# Patient Record
Sex: FEMALE | Race: WHITE | NOT HISPANIC OR LATINO | Employment: OTHER | URBAN - METROPOLITAN AREA
[De-identification: names, ages, dates, MRNs, and addresses within clinical notes are randomized per-mention and may not be internally consistent; named-entity substitution may affect disease eponyms.]

---

## 2017-01-03 ENCOUNTER — OFFICE VISIT (OUTPATIENT)
Dept: FAMILY MEDICINE CLINIC | Facility: CLINIC | Age: 77
End: 2017-01-03

## 2017-01-03 VITALS
HEIGHT: 63 IN | OXYGEN SATURATION: 97 % | RESPIRATION RATE: 16 BRPM | TEMPERATURE: 98.5 F | WEIGHT: 137.2 LBS | BODY MASS INDEX: 24.31 KG/M2 | SYSTOLIC BLOOD PRESSURE: 120 MMHG | HEART RATE: 80 BPM | DIASTOLIC BLOOD PRESSURE: 72 MMHG

## 2017-01-03 DIAGNOSIS — H66.93 BILATERAL OTITIS MEDIA, UNSPECIFIED CHRONICITY, UNSPECIFIED OTITIS MEDIA TYPE: ICD-10-CM

## 2017-01-03 DIAGNOSIS — J06.9 ACUTE URI: Primary | ICD-10-CM

## 2017-01-03 PROCEDURE — 99213 OFFICE O/P EST LOW 20 MIN: CPT | Performed by: NURSE PRACTITIONER

## 2017-01-03 RX ORDER — CEFUROXIME AXETIL 250 MG/1
250 TABLET ORAL 2 TIMES DAILY
Qty: 20 TABLET | Refills: 0 | Status: SHIPPED | OUTPATIENT
Start: 2017-01-03 | End: 2017-01-18

## 2017-01-03 NOTE — MR AVS SNAPSHOT
Sheila Burleson   1/3/2017 10:40 AM   Office Visit    Dept Phone:  [phone]   Encounter #:  [id number]    Provider:  KAREN Murray   Department:  [facility] MED                Your Full Care Plan              Today's Medication Changes          These changes are accurate as of: 1/3/17 10:42 AM.  If you have any questions, ask your nurse or doctor.               New Medication(s)Ordered:     cefuroxime 250 MG tablet   Commonly known as:  CEFTIN   Take 1 tablet by mouth 2 (Two) Times a Day.   Started by:  KAREN Murray         Stop taking medication(s)listed here:     nystatin [id number] UNIT/ML suspension   Commonly known as:  MYCOSTATIN   Stopped by:  Kathleen Morris MA                Where to Get Your Medications      These medications were sent to [address] AT [facility] ([facility]) - [phone] [facility] [phone] [place] [address]     Phone:  [phone]     cefuroxime 250 MG tablet                  Your Updated Medication List          This list is accurate as of: 1/3/17 10:42 AM.  Always use your most recent med list.                aspirin 81 MG tablet       cefuroxime 250 MG tablet   Commonly known as:  CEFTIN   Take 1 tablet by mouth 2 (Two) Times a Day.       dicyclomine 10 MG capsule   Commonly known as:  BENTYL   Take 1 capsule by mouth 4 (Four) Times a Day Before Meals & at Bedtime.       fluticasone 50 MCG/ACT nasal spray   Commonly known as:  FLONASE       levothyroxine 75 MCG tablet   Commonly known as:  SYNTHROID, LEVOTHROID   Take 1 tablet by mouth daily.       meloxicam 15 MG tablet   Commonly known as:  MOBIC   Take 1 tablet by mouth Daily.       sertraline 100 MG tablet   Commonly known as:  ZOLOFT   Take 1 tablet by mouth daily.       Vitamin D2 2000 UNITS tablet       zolpidem 5 MG tablet   Commonly known as:  AMBIEN   Take 1  "tablet by mouth At Night As Needed for sleep.               You Were Diagnosed With        Codes Comments    Acute URI    -  Primary ICD-10-CM: J06.9  ICD-9-CM: 465.9     Bilateral otitis media, unspecified chronicity, unspecified otitis media type     ICD-10-CM: H66.93  ICD-9-CM: 382.9       Instructions     None    Patient Instructions History      Upcoming Appointments     Visit Type Date Time Department    SAME DAY 1/3/2017 10:40 AM [facility]      [employer] Signup     Our records indicate that you have an active [employer] account.    You can view your After Visit Summary by going to joblocal and logging in with your [employer] username and password.  If you don't have a [employer] username and password but a parent or guardian has access to your record, the parent or guardian should login with their own [employer] username and password and access your record to view the After Visit Summary.    If you have questions, you can email [email or web address] or call [phone] to talk to our [employer] staff.  Remember, [employer] is NOT to be used for urgent needs.  For medical emergencies, dial 911.               Other Info from Your Visit           Allergies     Amoxicillin-pot Clavulanate      Clarithromycin  Nausea Only    Levofloxacin        Reason for Visit     Cough     Sore Throat           Vital Signs     Blood Pressure Pulse Temperature Respirations Height Weight    120/72 80 98.5 °F (36.9 °C) (Oral) 16 63\" (160 cm) 137 lb 3.2 oz (62.2 kg)    Oxygen Saturation Body Mass Index Smoking Status             97% 24.3 kg/m2 Never Smoker         Problems and Diagnoses Noted     Acute upper respiratory infection    -  Primary    Middle ear infection affecting both ears            "

## 2017-01-03 NOTE — PROGRESS NOTES
Subjective   Sheila Burleson is a 76 y.o. female.   Chief Complaint   Patient presents with   • Cough   • Sore Throat     Vitals:    01/03/17 1019   BP: 120/72   Pulse: 80   Resp: 16   Temp: 98.5 °F (36.9 °C)   SpO2: 97%     No LMP recorded.    History of Present Illness  Sheila is here for an acute visit. She c/o sore throat, ear pressure, and cough for 4 days. She went to the [facility] before alonzo and was given an antibiotic and a steroid but she did not finish the antibiotic because it made her sick to her stomach. She felt better and then her symptoms returned about 4 days ago. She denies fever, chills, or body aches. She did get her flu vaccine.     The following portions of the patient's history were reviewed and updated as appropriate: allergies, current medications, past family history, past medical history, past social history, past surgical history and problem list.    Review of Systems   Constitutional: Positive for fatigue. Negative for chills and fever.   HENT: Positive for ear pain, postnasal drip and sore throat.    Respiratory: Positive for cough. Negative for chest tightness, shortness of breath and wheezing.    Cardiovascular: Negative.        Objective   Physical Exam   Constitutional: Vital signs are normal. She appears well-developed and well-nourished. She appears ill. No distress.   HENT:   Right Ear: Ear canal normal. Tympanic membrane is injected and retracted.   Left Ear: Ear canal normal. Tympanic membrane is injected and retracted.   Nose: Mucosal edema present.   Mouth/Throat: Posterior oropharyngeal erythema present.   Cardiovascular: Normal rate and regular rhythm.    Pulmonary/Chest: Effort normal and breath sounds normal.   Neurological: She is alert.       Assessment/Plan   Sheila was seen today for cough and sore throat.    Diagnoses and all orders for this visit:    Acute URI    Bilateral otitis media, unspecified chronicity, unspecified otitis media type    Other orders  -      cefuroxime (CEFTIN) 250 MG tablet; Take 1 tablet by mouth 2 (Two) Times a Day.      Start ceftin- amoxil/augmentin causes GI upset, she has tolerated omnicef in the past  Rest and fluids  Robitussin/mucinex  Ok to continue to flonase  RTO in 2 weeks for an ear recheck or sooner if needed

## 2017-01-12 DIAGNOSIS — Z12.11 SCREENING FOR COLON CANCER: ICD-10-CM

## 2017-01-12 DIAGNOSIS — D36.9 ADENOMATOUS POLYP: ICD-10-CM

## 2017-01-12 DIAGNOSIS — Z83.71 FHX: COLONIC POLYPS: Primary | ICD-10-CM

## 2017-01-18 ENCOUNTER — OFFICE VISIT (OUTPATIENT)
Dept: FAMILY MEDICINE CLINIC | Facility: CLINIC | Age: 77
End: 2017-01-18

## 2017-01-18 VITALS
BODY MASS INDEX: 24.31 KG/M2 | HEIGHT: 63 IN | SYSTOLIC BLOOD PRESSURE: 114 MMHG | DIASTOLIC BLOOD PRESSURE: 86 MMHG | HEART RATE: 72 BPM | OXYGEN SATURATION: 96 % | WEIGHT: 137.2 LBS | RESPIRATION RATE: 16 BRPM | TEMPERATURE: 98.2 F

## 2017-01-18 DIAGNOSIS — R05.9 COUGH: ICD-10-CM

## 2017-01-18 DIAGNOSIS — H69.93 EUSTACHIAN TUBE DISORDER, BILATERAL: Primary | ICD-10-CM

## 2017-01-18 PROCEDURE — 99213 OFFICE O/P EST LOW 20 MIN: CPT | Performed by: NURSE PRACTITIONER

## 2017-01-18 NOTE — PROGRESS NOTES
Subjective   Sheila Burleson is a 76 y.o. female.   Chief Complaint   Patient presents with   • Follow-up     ears      Vitals:    01/18/17 0854   BP: 114/86   Pulse: 72   Resp: 16   Temp: 98.2 °F (36.8 °C)   SpO2: 96%     No LMP recorded.    History of Present Illness  Sheila is here for a 2 week check up for URI and bilateral otitis media. She overall is feeling better. She continues to have some cracking and popping in her ears, but denies pain. She also has a productive cough with occasional purulent sputum. She denies fever, chills or body aches. She is still taking flonase. She finished ceftin 4 days ago.     The following portions of the patient's history were reviewed and updated as appropriate: allergies, current medications, past family history, past medical history, past social history, past surgical history and problem list.    Review of Systems   Constitutional: Negative for chills and fever.   HENT: Positive for postnasal drip. Negative for ear pain (ear pressure, popping and cracking ), rhinorrhea and sore throat.    Respiratory: Positive for cough. Negative for shortness of breath and wheezing.    Cardiovascular: Negative for chest pain.   Musculoskeletal: Positive for myalgias.   Skin: Negative for rash.   Neurological: Negative for headaches.       Objective   Physical Exam   Constitutional: Vital signs are normal. She appears well-developed and well-nourished. No distress.   HENT:   Right Ear: Tympanic membrane is erythematous (mild ). A middle ear effusion is present.   Left Ear: Tympanic membrane is not erythematous. A middle ear effusion is present.   Nose: Nose normal.   Mouth/Throat: Uvula is midline and oropharynx is clear and moist.   Cardiovascular: Normal rate, regular rhythm and normal heart sounds.    Pulmonary/Chest: Effort normal and breath sounds normal.   Congested cough    Neurological: She is alert.       Assessment/Plan   Sheila was seen today for follow-up.    Diagnoses and all  orders for this visit:    Eustachian tube disorder, bilateral    Cough      Continue flonase   Robitussin as needed for cough  Follow up if symptoms persist, or worsen or if you develop new symptoms

## 2017-01-18 NOTE — MR AVS SNAPSHOT
Sheila HAMMOND Sarah Beth   1/18/2017 9:00 AM   Office Visit    Dept Phone:  [phone]   Encounter #:  [id number]    Provider:  KAREN Murray   Department:  [facility] MED                Your Full Care Plan              Today's Medication Changes          These changes are accurate as of: 1/18/17  9:16 AM.  If you have any questions, ask your nurse or doctor.               Stop taking medication(s)listed here:     cefuroxime 250 MG tablet   Commonly known as:  CEFTIN   Stopped by:  KAREN Murray                      Your Updated Medication List          This list is accurate as of: 1/18/17  9:16 AM.  Always use your most recent med list.                aspirin 81 MG tablet       dicyclomine 10 MG capsule   Commonly known as:  BENTYL   Take 1 capsule by mouth 4 (Four) Times a Day Before Meals & at Bedtime.       fluticasone 50 MCG/ACT nasal spray   Commonly known as:  FLONASE       levothyroxine 75 MCG tablet   Commonly known as:  SYNTHROID, LEVOTHROID   Take 1 tablet by mouth daily.       meloxicam 15 MG tablet   Commonly known as:  MOBIC   Take 1 tablet by mouth Daily.       sertraline 100 MG tablet   Commonly known as:  ZOLOFT   Take 1 tablet by mouth daily.       Vitamin D2 2000 UNITS tablet       zolpidem 5 MG tablet   Commonly known as:  AMBIEN   Take 1 tablet by mouth At Night As Needed for sleep.               Instructions     None    Patient Instructions History      Upcoming Appointments     Visit Type Date Time Department    FOLLOW UP 1/18/2017  9:00 AM [facility]      [employer] Signup     Our records indicate that you have an active [employer] account.    You can view your After Visit Summary by going to [employer] and logging in with your [employer] username and password.  If you don't have a [employer] username and password but a parent or guardian has access to your record, the parent or guardian should  "login with their own [employer] username and password and access your record to view the After Visit Summary.    If you have questions, you can email [email or web address] or call [phone] to talk to our [employer] staff.  Remember, [employer] is NOT to be used for urgent needs.  For medical emergencies, dial 911.               Other Info from Your Visit           Allergies     Amoxicillin-pot Clavulanate      Clarithromycin  Nausea Only    Levofloxacin        Reason for Visit     Follow-up ears       Vital Signs     Blood Pressure Pulse Temperature Respirations Height Weight    114/86 72 98.2 °F (36.8 °C) (Oral) 16 63\" (160 cm) 137 lb 3.2 oz (62.2 kg)    Oxygen Saturation Body Mass Index Smoking Status             96% 24.3 kg/m2 Never Smoker           "

## 2017-02-01 ENCOUNTER — OFFICE VISIT (OUTPATIENT)
Dept: FAMILY MEDICINE CLINIC | Facility: CLINIC | Age: 77
End: 2017-02-01

## 2017-02-01 ENCOUNTER — HOSPITAL ENCOUNTER (OUTPATIENT)
Dept: GENERAL RADIOLOGY | Facility: HOSPITAL | Age: 77
Discharge: HOME | End: 2017-02-01
Admitting: INTERNAL MEDICINE

## 2017-02-01 VITALS
HEART RATE: 73 BPM | BODY MASS INDEX: 23.92 KG/M2 | WEIGHT: 135 LBS | HEIGHT: 63 IN | OXYGEN SATURATION: 95 % | DIASTOLIC BLOOD PRESSURE: 80 MMHG | TEMPERATURE: 98.2 F | RESPIRATION RATE: 16 BRPM | SYSTOLIC BLOOD PRESSURE: 138 MMHG

## 2017-02-01 DIAGNOSIS — R53.82 CHRONIC FATIGUE: ICD-10-CM

## 2017-02-01 DIAGNOSIS — Z87.898 HISTORY OF FATIGUE: ICD-10-CM

## 2017-02-01 DIAGNOSIS — R53.83 FATIGUE, UNSPECIFIED TYPE: ICD-10-CM

## 2017-02-01 DIAGNOSIS — M54.31 BILATERAL SCIATICA: ICD-10-CM

## 2017-02-01 DIAGNOSIS — M54.50 LUMBAR SPINE PAIN: Primary | ICD-10-CM

## 2017-02-01 DIAGNOSIS — M54.32 BILATERAL SCIATICA: ICD-10-CM

## 2017-02-01 PROCEDURE — 72110 X-RAY EXAM L-2 SPINE 4/>VWS: CPT

## 2017-02-01 PROCEDURE — 99213 OFFICE O/P EST LOW 20 MIN: CPT | Performed by: INTERNAL MEDICINE

## 2017-02-02 PROBLEM — M54.50 LUMBAR SPINE PAIN: Status: ACTIVE | Noted: 2017-02-02

## 2017-02-02 PROBLEM — R53.83 FATIGUE: Status: ACTIVE | Noted: 2017-02-02

## 2017-02-02 LAB
ALBUMIN SERPL-MCNC: 4.8 G/DL (ref 3.5–5.2)
ALBUMIN/GLOB SERPL: 2 G/DL
ALP SERPL-CCNC: 90 U/L (ref 39–117)
ALT SERPL-CCNC: 15 U/L (ref 1–33)
AST SERPL-CCNC: 20 U/L (ref 1–32)
BASOPHILS # BLD AUTO: 0.04 10*3/MM3 (ref 0–0.2)
BASOPHILS NFR BLD AUTO: 0.9 % (ref 0–1.5)
BILIRUB SERPL-MCNC: 0.4 MG/DL (ref 0.1–1.2)
BUN SERPL-MCNC: 19 MG/DL (ref 8–23)
BUN/CREAT SERPL: 21.1 (ref 7–25)
CALCIUM SERPL-MCNC: 10.3 MG/DL (ref 8.6–10.5)
CHLORIDE SERPL-SCNC: 103 MMOL/L (ref 98–107)
CO2 SERPL-SCNC: 28.3 MMOL/L (ref 22–29)
CREAT SERPL-MCNC: 0.9 MG/DL (ref 0.57–1)
EOSINOPHIL # BLD AUTO: 0.42 10*3/MM3 (ref 0–0.7)
EOSINOPHIL NFR BLD AUTO: 9 % (ref 0.3–6.2)
ERYTHROCYTE [DISTWIDTH] IN BLOOD BY AUTOMATED COUNT: 13.9 % (ref 11.7–13)
GLOBULIN SER CALC-MCNC: 2.4 GM/DL
GLUCOSE SERPL-MCNC: 99 MG/DL (ref 65–99)
HCT VFR BLD AUTO: 45.9 % (ref 35.6–45.5)
HGB BLD-MCNC: 14.2 G/DL (ref 11.9–15.5)
IMM GRANULOCYTES # BLD: 0 10*3/MM3 (ref 0–0.03)
IMM GRANULOCYTES NFR BLD: 0 % (ref 0–0.5)
LYMPHOCYTES # BLD AUTO: 0.85 10*3/MM3 (ref 0.9–4.8)
LYMPHOCYTES NFR BLD AUTO: 18.2 % (ref 19.6–45.3)
MCH RBC QN AUTO: 28.2 PG (ref 26.9–32)
MCHC RBC AUTO-ENTMCNC: 30.9 G/DL (ref 32.4–36.3)
MCV RBC AUTO: 91.1 FL (ref 80.5–98.2)
MONOCYTES # BLD AUTO: 0.59 10*3/MM3 (ref 0.2–1.2)
MONOCYTES NFR BLD AUTO: 12.6 % (ref 5–12)
NEUTROPHILS # BLD AUTO: 2.78 10*3/MM3 (ref 1.9–8.1)
NEUTROPHILS NFR BLD AUTO: 59.3 % (ref 42.7–76)
PLATELET # BLD AUTO: 273 10*3/MM3 (ref 140–500)
POTASSIUM SERPL-SCNC: 4.5 MMOL/L (ref 3.5–5.2)
PROT SERPL-MCNC: 7.2 G/DL (ref 6–8.5)
RBC # BLD AUTO: 5.04 10*6/MM3 (ref 3.9–5.2)
SODIUM SERPL-SCNC: 143 MMOL/L (ref 136–145)
T4 FREE SERPL-MCNC: 1.11 NG/DL (ref 0.93–1.7)
TSH SERPL DL<=0.005 MIU/L-ACNC: 0.47 MIU/ML (ref 0.27–4.2)
VIT B12 SERPL-MCNC: 991 PG/ML (ref 211–946)
WBC # BLD AUTO: 4.68 10*3/MM3 (ref 4.5–10.7)

## 2017-02-02 NOTE — PROGRESS NOTES
Subjective   Sheila Burleson is a 76 y.o. female. Patient is here today for   Chief Complaint   Patient presents with   • Leg Pain     bilateral leg pain    • Cough     cough and congestion           Vitals:    02/01/17 0926   BP: 138/80   Pulse: 73   Resp: 16   Temp: 98.2 °F (36.8 °C)   SpO2: 95%       Past Medical History   Diagnosis Date   • Allergy desensitization therapy      gets shot weekly   • Hyperlipidemia    • Sleep apnea       Allergies   Allergen Reactions   • Amoxicillin-Pot Clavulanate    • Clarithromycin Nausea Only   • Levofloxacin    • Pollen Extract       Social History     Social History   • Marital status: Single     Spouse name: N/A   • Number of children: N/A   • Years of education: N/A     Occupational History   • Not on file.     Social History Main Topics   • Smoking status: Never Smoker   • Smokeless tobacco: Not on file   • Alcohol use Yes      Comment: social   • Drug use: Not on file   • Sexual activity: Not on file     Other Topics Concern   • Not on file     Social History Narrative        Current Outpatient Prescriptions:   •  aspirin 81 MG tablet, Take by mouth daily., Disp: , Rfl:   •  dicyclomine (BENTYL) 10 MG capsule, Take 1 capsule by mouth 4 (Four) Times a Day Before Meals & at Bedtime., Disp: 30 capsule, Rfl: 1  •  Ergocalciferol (VITAMIN D2) 2000 UNITS tablet, Take by mouth daily., Disp: , Rfl:   •  fluticasone (FLONASE) 50 MCG/ACT nasal spray, into each nostril daily., Disp: , Rfl:   •  levothyroxine (SYNTHROID, LEVOTHROID) 75 MCG tablet, Take 1 tablet by mouth daily., Disp: 90 tablet, Rfl: 3  •  meloxicam (MOBIC) 15 MG tablet, Take 1 tablet by mouth Daily., Disp: 30 tablet, Rfl: 1  •  sertraline (ZOLOFT) 100 MG tablet, Take 1 tablet by mouth daily., Disp: 30 tablet, Rfl: 3  •  zolpidem (AMBIEN) 5 MG tablet, Take 1 tablet by mouth At Night As Needed for sleep., Disp: 30 tablet, Rfl: 3     Objective     HPI Comments: She is fatigued.  She has bilateral leg pain and lumbar spine  pain.    She is a cough which has been nonproductive.    Leg Pain      Cough          Review of Systems   Constitutional: Positive for fatigue.   Respiratory: Positive for cough.    Musculoskeletal:        She complains of lumbar spine pain and bilateral lower extremity pain.   Psychiatric/Behavioral: Negative.        Physical Exam   Constitutional: She is oriented to person, place, and time.   HENT:   Head: Normocephalic and atraumatic.   Pulmonary/Chest: Effort normal.   Musculoskeletal:   She doesn't have any pain on palpation of lumbar spine.   Neurological: She is alert and oriented to person, place, and time. She has normal reflexes.   Psychiatric: She has a normal mood and affect. Her behavior is normal.   Nursing note and vitals reviewed.        Problem List Items Addressed This Visit        Nervous and Auditory    Bilateral sciatica    Lumbar spine pain - Primary    Relevant Orders    XR Spine Lumbar 4+ View (Completed)       Other    Fatigue    Relevant Orders    CBC & Differential (Completed)    Comprehensive Metabolic Panel (Completed)    Vitamin B12 (Completed)    TSH (Completed)    T4, Free (Completed)      Other Visit Diagnoses     History of fatigue                PLAN  I like he lumbar spine x-ray, CBC, comprehensive metabolic panel, vitamin B12, TSH and free T4.  I would like her to follow-up to discuss the results of the x-ray when it is available.  No Follow-up on file.

## 2017-02-03 ENCOUNTER — TELEPHONE (OUTPATIENT)
Dept: FAMILY MEDICINE CLINIC | Facility: CLINIC | Age: 77
End: 2017-02-03

## 2017-02-03 RX ORDER — AZITHROMYCIN 250 MG/1
TABLET, FILM COATED ORAL
Qty: 6 TABLET | Refills: 0 | Status: ON HOLD | OUTPATIENT
Start: 2017-02-03 | End: 2017-02-07

## 2017-02-03 NOTE — TELEPHONE ENCOUNTER
AFTER SPEAKING WITH , I CALLED THE PATIENT AND INFORMED HER THAT DR. WISEMAN WANTED TO CALL HER IN A Z-PACK AND THAT HE WANTED HER TO FOLLOW-UP IN ONE WEEK TO DISCUSS HER COUGH AND ALSO THE X-RAY THAT WAS ORDERED AT HER LAST VISIT.  PATIENT EXPRESSED UNDERSTANDING AND THE APPOINTMENT WAS MADE FOR THE PATIENT TODAY.  PRESCRIPTION WAS SENT TO PHARMACY   ----- Message from Niya Roberts MA sent at 2/3/2017 10:20 AM EST -----  SAW DR WISEMAN Wednesday FOR BACK PAIN AND COUGH.   SHE IS STILL HAVING COUGH AND SINUS INFECTION SYMPTOMS.   PER MISHA'S OFFICE NOTE SHE SAID FOR PATIENT TO FOLLOW UP IF SHE DOES NOT GET BETTER, PATIENT DID NOT WANT TO COME BACK IN SINCE SHE WAS JUST IN.   WOULD LIKE ANTIBIOTIC SEND OUT.

## 2017-02-04 ENCOUNTER — APPOINTMENT (OUTPATIENT)
Dept: CT IMAGING | Facility: HOSPITAL | Age: 77
End: 2017-02-04

## 2017-02-04 ENCOUNTER — HOSPITAL ENCOUNTER (EMERGENCY)
Facility: HOSPITAL | Age: 77
Discharge: HOME | End: 2017-02-04
Attending: EMERGENCY MEDICINE | Admitting: EMERGENCY MEDICINE

## 2017-02-04 VITALS
HEART RATE: 65 BPM | HEIGHT: 63 IN | OXYGEN SATURATION: 93 % | RESPIRATION RATE: 16 BRPM | TEMPERATURE: 97.6 F | DIASTOLIC BLOOD PRESSURE: 90 MMHG | BODY MASS INDEX: 23.92 KG/M2 | SYSTOLIC BLOOD PRESSURE: 165 MMHG | WEIGHT: 135 LBS

## 2017-02-04 DIAGNOSIS — H81.10 VERTIGO, BENIGN PAROXYSMAL, UNSPECIFIED LATERALITY: Primary | ICD-10-CM

## 2017-02-04 DIAGNOSIS — R03.0 TRANSIENT HYPERTENSION: ICD-10-CM

## 2017-02-04 LAB
HOLD SPECIMEN: NORMAL
HOLD SPECIMEN: NORMAL
WHOLE BLOOD HOLD SPECIMEN: NORMAL
WHOLE BLOOD HOLD SPECIMEN: NORMAL

## 2017-02-04 RX ORDER — LABETALOL HYDROCHLORIDE 5 MG/ML
10 INJECTION, SOLUTION INTRAVENOUS ONCE
Status: COMPLETED | OUTPATIENT
Start: 2017-02-04 | End: 2017-02-04

## 2017-02-04 RX ORDER — SODIUM CHLORIDE 0.9 % (FLUSH) 0.9 %
10 SYRINGE (ML) INJECTION AS NEEDED
Status: DISCONTINUED | OUTPATIENT
Start: 2017-02-04 | End: 2017-02-04 | Stop reason: HOSPADM

## 2017-02-04 RX ORDER — SCOLOPAMINE TRANSDERMAL SYSTEM 1 MG/1
1 PATCH, EXTENDED RELEASE TRANSDERMAL
Qty: 4 PATCH | Refills: 0 | Status: ON HOLD | OUTPATIENT
Start: 2017-02-04 | End: 2017-02-07

## 2017-02-04 RX ORDER — MECLIZINE HYDROCHLORIDE 50 MG/1
50 TABLET ORAL 3 TIMES DAILY PRN
Qty: 30 TABLET | Refills: 0 | Status: SHIPPED | OUTPATIENT
Start: 2017-02-04 | End: 2017-02-08 | Stop reason: HOSPADM

## 2017-02-04 RX ORDER — MECLIZINE HYDROCHLORIDE 25 MG/1
50 TABLET ORAL ONCE
Status: COMPLETED | OUTPATIENT
Start: 2017-02-04 | End: 2017-02-04

## 2017-02-04 RX ADMIN — MECLIZINE 50 MG: 25 TABLET ORAL at 13:50

## 2017-02-04 RX ADMIN — LABETALOL HYDROCHLORIDE 10 MG: 5 INJECTION, SOLUTION INTRAVENOUS at 14:29

## 2017-02-04 NOTE — ED NOTES
"3 hours ago pt. Was laying down watching t.v., sat up, felt dizzy and noticed when she turned her head she saw \"double.\" Pt. Noticed she also had an unsteady gait, but no noticeable change in mobility. Strength. Pt. Awoke normal this am at 9am.      Aldair Angeles RN  02/04/17 3207    "

## 2017-02-04 NOTE — ED PROVIDER NOTES
" EMERGENCY DEPARTMENT ENCOUNTER    CHIEF COMPLAINT  Chief Complaint: dizziness  History given by: patient   History limited by: none  Room Number: 10/10  PMD: Silvano Barnhart MD      HPI:  Pt is a 76 y.o. female who presents complaining of dizziness onset 3-4 hours ago while getting up from a supine position. The pt describes her dizziness as \"spinning\" that worsens when turning her head (especially to the left moreso than the right). She has intermittent double vision with lateral gaze as well. Pt states that she has had a sinus infection 4 weeks ago that is now resolved except for some congestion and left ear pain. She also reports cough for 4 weeks, L ear pressure and pain with no tinnitus, but she does have a popping sensation at times.   She has no gait imbalance and denies any other hearing problems.  No HA, no numbness nor weakness to extremities. Pt denies any recent falls.        Duration:  3-4 hours  Onset: gradual   Timing: intermittent   Location: n/a  Radiation: n/a  Quality: \"spinning\"  Intensity/Severity: moderate  Progression: unchanged  Associated Symptoms: cough, sinus congestion, L ear pressure and pain with no ringing, gait imbalance, double vision  Aggravating Factors: turning her head L>R  Alleviating Factors: none  Previous Episodes: recent upper respiratory infection 4 weeks prior  Treatment before arrival: none    PAST MEDICAL HISTORY  Active Ambulatory Problems     Diagnosis Date Noted   • Atopic rhinitis 01/28/2016   • Arthropathy of knee 01/28/2016   • Depression 01/28/2016   • Gastroesophageal reflux disease 01/28/2016   • Hypothyroidism 01/28/2016   • Insomnia 01/28/2016   • Sialodocholithiasis 01/28/2016   • Irritable bowel syndrome 03/16/2016   • Hypersomnia 05/02/2016   • Vitamin D deficiency 08/30/2016   • Arthritis 10/27/2016   • Lower abdominal pain 12/16/2016   • Bilateral sciatica 12/16/2016   • Lumbar spine pain 02/02/2017   • Fatigue 02/02/2017     Resolved Ambulatory " Problems     Diagnosis Date Noted   • No Resolved Ambulatory Problems     Past Medical History   Diagnosis Date   • Allergy desensitization therapy    • Hyperlipidemia    • Sleep apnea        PAST SURGICAL HISTORY  Past Surgical History   Procedure Laterality Date   • Appendectomy     • Cholecystectomy     • Colon surgery     • Hysterectomy         FAMILY HISTORY  Family History   Problem Relation Age of Onset   • Stroke Mother    • Heart disease Father        SOCIAL HISTORY  Social History     Social History   • Marital status: Single     Spouse name: N/A   • Number of children: N/A   • Years of education: N/A     Occupational History   • Not on file.     Social History Main Topics   • Smoking status: Never Smoker   • Smokeless tobacco: Not on file   • Alcohol use Yes      Comment: social   • Drug use: Not on file   • Sexual activity: Not on file     Other Topics Concern   • Not on file     Social History Narrative       ALLERGIES  Amoxicillin-pot clavulanate; Clarithromycin; Levofloxacin; and Pollen extract    REVIEW OF SYSTEMS  Review of Systems   Constitutional: Negative for fever.   HENT: Positive for congestion and ear pain ( L). Negative for sore throat and tinnitus.    Eyes: Positive for visual disturbance (double vision).   Respiratory: Positive for cough. Negative for shortness of breath.    Cardiovascular: Negative for chest pain.   Gastrointestinal: Negative for abdominal pain, diarrhea and vomiting.   Genitourinary: Negative for dysuria.   Musculoskeletal: Positive for gait problem. Negative for neck pain.   Skin: Negative for rash.   Allergic/Immunologic: Negative.    Neurological: Positive for dizziness. Negative for weakness, numbness and headaches.   Hematological: Negative.    Psychiatric/Behavioral: Negative.    All other systems reviewed and are negative.      PHYSICAL EXAM  ED Triage Vitals   Temp Heart Rate Resp BP SpO2   02/04/17 1244 02/04/17 1244 02/04/17 1307 02/04/17 1253 02/04/17 1244    97.6 °F (36.4 °C) 76 16 187/93 97 %      Temp src Heart Rate Source Patient Position BP Location FiO2 (%)   02/04/17 1244 02/04/17 1244 02/04/17 1307 02/04/17 1307 --   Tympanic Monitor Lying Right arm        Physical Exam   Constitutional: She is oriented to person, place, and time and well-developed, well-nourished, and in no distress. No distress.   HENT:   Head: Normocephalic and atraumatic.   Eyes: EOM are normal. Pupils are equal, round, and reactive to light.   horizontal nystagmus to the L which fatigue and reproduces her symptoms   Neck: Normal range of motion. Neck supple.   Cardiovascular: Normal rate, regular rhythm and normal heart sounds.    Pulmonary/Chest: Effort normal and breath sounds normal. No respiratory distress.   Abdominal: Soft. There is no tenderness. There is no rebound and no guarding.   Musculoskeletal: Normal range of motion. She exhibits no edema.   Neurological: She is alert and oriented to person, place, and time. She has normal sensation and normal strength.   Skin: Skin is warm and dry. No rash noted.   Psychiatric: Mood and affect normal.   Nursing note and vitals reviewed.      LAB RESULTS  Lab Results (last 24 hours)     ** No results found for the last 24 hours. **          I ordered the above labs and reviewed the results    RADIOLOGY  CT Head Without Contrast   There is no evidence for acute intracranial hemorrhage or  infarction. There is no midline shift. There are moderate  periventricular small vessel ischemic changes. Ventricles appear within  normal limits. Visualized paranasal sinuses are clear. No acute  abnormality is seen.         I ordered the above noted radiological studies. Interpreted by radiologist.Reviewed by me in PACS.       PROCEDURES  Procedures      PROGRESS AND CONSULTS  ED Course     1338- Ordered CT head and blood work for further evaluation. Ordered antivert for dizziness.    1451- Rechecked pt whose symptoms have imporved. D/w pt of the  unremarkable imagining results and my plan for discharge and dx of vertigo. Gave the pt and family RTER warnings that includes worsening dizziness and intractable vomiting. Pt understands and agrees with the plan. All questions answered.  Advised to follow up with her PMD for a BP check and re-evaluation next week.      MEDICAL DECISION MAKING  Results were reviewed/discussed with the patient and they were also made aware of online access. Pt also made aware that some labs, such as cultures, will not be resulted during ER visit and follow up with PMD is necessary.     MDM  Number of Diagnoses or Management Options  Transient hypertension:   Vertigo, benign paroxysmal, unspecified laterality:      Amount and/or Complexity of Data Reviewed  Clinical lab tests: ordered and reviewed  Tests in the radiology section of CPT®: ordered and reviewed (CT head: negative acute)  Decide to obtain previous medical records or to obtain history from someone other than the patient: yes  Independent visualization of images, tracings, or specimens: yes    Patient Progress  Patient progress: stable         DIAGNOSIS  Final diagnoses:   Vertigo, benign paroxysmal, unspecified laterality   Transient hypertension       DISPOSITION  DISCHARGE    Patient discharged in stable condition.    Reviewed implications of results, diagnosis, meds, responsibility to follow up, warning signs and symptoms of possible worsening, potential complications and reasons to return to ER, including any worsening dizziness or symptoms.    Patient/Family voiced understanding of above instructions.    Discussed plan for discharge, as there is no emergent indication for admission.  Pt/family is agreeable and understands need for follow up and repeat testing.  Pt is aware that discharge does not mean that nothing is wrong but it indicates no emergency is present that requires admission and they must continue care with follow-up as given below or physician of their  choice.     FOLLOW-UP  Silvano Barnhart MD  [address] [facility] [address]  [phone]    Schedule an appointment as soon as possible for a visit  follow up vertigo, recheck blood pressure         Medication List      New Prescriptions          meclizine 50 MG tablet   Commonly known as:  ANTIVERT   Take 1 tablet by mouth 3 (Three) Times a Day As Needed for dizziness.       Scopolamine 1.5 MG/3DAYS patch   Commonly known as:  TRANSDERM-SCOP   Place 1 patch on the skin Every 72 (Seventy-Two) Hours.               Latest Documented Vital Signs:  As of 3:18 PM  BP- 165/90 HR- 65 Temp- 97.6 °F (36.4 °C) (Tympanic) O2 sat- 93%    --  Documentation assistance provided by brandi Saldana for Dr. Hope.  Information recorded by the scribe was done at my direction and has been verified and validated by me.    Documentation assistance provided by brandi Joshua for Dr. Hope.  Information recorded by the scribe was done at my direction and has been verified and validated by me.     Desmond Joshua  02/04/17 1515       Magdaleno Hope MD  02/04/17 151

## 2017-02-06 ENCOUNTER — HOSPITAL ENCOUNTER (INPATIENT)
Facility: HOSPITAL | Age: 77
LOS: 1 days | Discharge: HOME | End: 2017-02-08
Attending: EMERGENCY MEDICINE | Admitting: INTERNAL MEDICINE

## 2017-02-06 DIAGNOSIS — I63.9 ACUTE CVA (CEREBROVASCULAR ACCIDENT) (HCC): ICD-10-CM

## 2017-02-06 DIAGNOSIS — R42 DIZZINESS: ICD-10-CM

## 2017-02-06 DIAGNOSIS — H51.8 DYSCONJUGATE GAZE: ICD-10-CM

## 2017-02-06 DIAGNOSIS — H53.2 DIPLOPIA: Primary | ICD-10-CM

## 2017-02-06 DIAGNOSIS — S05.02XA CORNEAL ABRASION, LEFT, INITIAL ENCOUNTER: ICD-10-CM

## 2017-02-06 PROCEDURE — 99284 EMERGENCY DEPT VISIT MOD MDM: CPT

## 2017-02-06 NOTE — ED NOTES
Patient seen here on Saturday for dizziness and some double vision. States that she still has some dizziness but that her double vision is much worse. Patient reports that she dropped hair dye into her left eye this morning and states that the left eye is painful and that's where she has the double vision. Patient's eye is red and slightly swollen.      Holly Rivera RN  02/06/17 1800

## 2017-02-07 ENCOUNTER — APPOINTMENT (OUTPATIENT)
Dept: CT IMAGING | Facility: HOSPITAL | Age: 77
End: 2017-02-07

## 2017-02-07 ENCOUNTER — APPOINTMENT (OUTPATIENT)
Dept: CARDIOLOGY | Facility: HOSPITAL | Age: 77
End: 2017-02-07
Attending: PSYCHIATRY & NEUROLOGY

## 2017-02-07 ENCOUNTER — APPOINTMENT (OUTPATIENT)
Dept: MRI IMAGING | Facility: HOSPITAL | Age: 77
End: 2017-02-07
Attending: INTERNAL MEDICINE

## 2017-02-07 PROBLEM — I63.9 ACUTE CVA (CEREBROVASCULAR ACCIDENT) (HCC): Status: ACTIVE | Noted: 2017-02-07

## 2017-02-07 PROBLEM — H53.2 DIPLOPIA: Status: ACTIVE | Noted: 2017-02-07

## 2017-02-07 LAB
ALBUMIN SERPL-MCNC: 4.8 G/DL (ref 3.5–5.2)
ALBUMIN/GLOB SERPL: 1.8 G/DL
ALP SERPL-CCNC: 91 U/L (ref 39–117)
ALT SERPL W P-5'-P-CCNC: 15 U/L (ref 1–33)
ANION GAP SERPL CALCULATED.3IONS-SCNC: 11.7 MMOL/L
AST SERPL-CCNC: 24 U/L (ref 1–32)
BASOPHILS # BLD AUTO: 0.03 10*3/MM3 (ref 0–0.2)
BASOPHILS NFR BLD AUTO: 0.4 % (ref 0–1.5)
BH CV ECHO MEAS - ACS: 2.1 CM
BH CV ECHO MEAS - AO MEAN PG (FULL): 2 MMHG
BH CV ECHO MEAS - AO MEAN PG: 4 MMHG
BH CV ECHO MEAS - AO ROOT AREA (BSA CORRECTED): 1.6
BH CV ECHO MEAS - AO ROOT AREA: 4.9 CM^2
BH CV ECHO MEAS - AO ROOT DIAM: 2.5 CM
BH CV ECHO MEAS - AO V2 MAX: 141 CM/SEC
BH CV ECHO MEAS - AO V2 MEAN: 93.1 CM/SEC
BH CV ECHO MEAS - AO V2 VTI: 25.7 CM
BH CV ECHO MEAS - AVA(I,A): 2.7 CM^2
BH CV ECHO MEAS - AVA(I,D): 2.7 CM^2
BH CV ECHO MEAS - BSA(HAYCOCK): 1.6 M^2
BH CV ECHO MEAS - BSA: 1.6 M^2
BH CV ECHO MEAS - BZI_BMI: 24.1 KILOGRAMS/M^2
BH CV ECHO MEAS - BZI_METRIC_HEIGHT: 157.5 CM
BH CV ECHO MEAS - BZI_METRIC_WEIGHT: 59.9 KG
BH CV ECHO MEAS - CONTRAST EF (2CH): 61.2 ML/M^2
BH CV ECHO MEAS - CONTRAST EF 4CH: 63.1 ML/M^2
BH CV ECHO MEAS - EDV(CUBED): 74.1 ML
BH CV ECHO MEAS - EDV(MOD-SP2): 49 ML
BH CV ECHO MEAS - EDV(MOD-SP4): 65 ML
BH CV ECHO MEAS - EDV(TEICH): 78.6 ML
BH CV ECHO MEAS - EF(CUBED): 63.6 %
BH CV ECHO MEAS - EF(MOD-SP2): 61.2 %
BH CV ECHO MEAS - EF(MOD-SP4): 63.1 %
BH CV ECHO MEAS - EF(TEICH): 55.5 %
BH CV ECHO MEAS - ESV(CUBED): 27 ML
BH CV ECHO MEAS - ESV(MOD-SP2): 19 ML
BH CV ECHO MEAS - ESV(MOD-SP4): 24 ML
BH CV ECHO MEAS - ESV(TEICH): 35 ML
BH CV ECHO MEAS - FS: 28.6 %
BH CV ECHO MEAS - IVS/LVPW: 1.1
BH CV ECHO MEAS - IVSD: 1.1 CM
BH CV ECHO MEAS - LAT PEAK E' VEL: 5 CM/SEC
BH CV ECHO MEAS - LV DIASTOLIC VOL/BSA (35-75): 40.6 ML/M^2
BH CV ECHO MEAS - LV MASS(C)D: 147 GRAMS
BH CV ECHO MEAS - LV MASS(C)DI: 91.8 GRAMS/M^2
BH CV ECHO MEAS - LV MEAN PG: 2 MMHG
BH CV ECHO MEAS - LV SYSTOLIC VOL/BSA (12-30): 15 ML/M^2
BH CV ECHO MEAS - LV V1 MAX: 106 CM/SEC
BH CV ECHO MEAS - LV V1 MEAN: 60.2 CM/SEC
BH CV ECHO MEAS - LV V1 VTI: 20.2 CM
BH CV ECHO MEAS - LVIDD: 4.2 CM
BH CV ECHO MEAS - LVIDS: 3 CM
BH CV ECHO MEAS - LVLD AP2: 6.7 CM
BH CV ECHO MEAS - LVLD AP4: 6.3 CM
BH CV ECHO MEAS - LVLS AP2: 5.8 CM
BH CV ECHO MEAS - LVLS AP4: 5.3 CM
BH CV ECHO MEAS - LVOT AREA (M): 3.5 CM^2
BH CV ECHO MEAS - LVOT AREA: 3.5 CM^2
BH CV ECHO MEAS - LVOT DIAM: 2.1 CM
BH CV ECHO MEAS - LVPWD: 1 CM
BH CV ECHO MEAS - MED PEAK E' VEL: 4 CM/SEC
BH CV ECHO MEAS - MV A DUR: 158 SEC
BH CV ECHO MEAS - MV A MAX VEL: 105 CM/SEC
BH CV ECHO MEAS - MV DEC SLOPE: 254 CM/SEC^2
BH CV ECHO MEAS - MV DEC TIME: 190 SEC
BH CV ECHO MEAS - MV E MAX VEL: 70.6 CM/SEC
BH CV ECHO MEAS - MV E/A: 0.67
BH CV ECHO MEAS - MV MEAN PG: 3 MMHG
BH CV ECHO MEAS - MV P1/2T MAX VEL: 82.8 CM/SEC
BH CV ECHO MEAS - MV P1/2T: 95.5 MSEC
BH CV ECHO MEAS - MV V2 MEAN: 72.9 CM/SEC
BH CV ECHO MEAS - MV V2 VTI: 31 CM
BH CV ECHO MEAS - MVA P1/2T LCG: 2.7 CM^2
BH CV ECHO MEAS - MVA(P1/2T): 2.3 CM^2
BH CV ECHO MEAS - MVA(VTI): 2.3 CM^2
BH CV ECHO MEAS - PA ACC SLOPE: 11.3 CM/SEC^2
BH CV ECHO MEAS - PA ACC TIME: 0.12 SEC
BH CV ECHO MEAS - PA MAX PG: 4.8 MMHG
BH CV ECHO MEAS - PA PR(ACCEL): 25 MMHG
BH CV ECHO MEAS - PA V2 MAX: 110 CM/SEC
BH CV ECHO MEAS - PULM A REVS DUR: 109 SEC
BH CV ECHO MEAS - PULM A REVS VEL: 37 CM/SEC
BH CV ECHO MEAS - PULM DIAS VEL: 40.5 CM/SEC
BH CV ECHO MEAS - PULM S/D: 1.7
BH CV ECHO MEAS - PULM SYS VEL: 68.6 CM/SEC
BH CV ECHO MEAS - RAP SYSTOLE: 3 MMHG
BH CV ECHO MEAS - RV MEAN PG: 1 MMHG
BH CV ECHO MEAS - RV V1 MEAN: 49.4 CM/SEC
BH CV ECHO MEAS - RV V1 VTI: 13.6 CM
BH CV ECHO MEAS - RVSP: 25 MMHG
BH CV ECHO MEAS - SI(AO): 78.7 ML/M^2
BH CV ECHO MEAS - SI(CUBED): 29.4 ML/M^2
BH CV ECHO MEAS - SI(LVOT): 43.7 ML/M^2
BH CV ECHO MEAS - SI(MOD-SP2): 18.7 ML/M^2
BH CV ECHO MEAS - SI(MOD-SP4): 25.6 ML/M^2
BH CV ECHO MEAS - SI(TEICH): 27.2 ML/M^2
BH CV ECHO MEAS - SV(AO): 126.2 ML
BH CV ECHO MEAS - SV(CUBED): 47.1 ML
BH CV ECHO MEAS - SV(LVOT): 70 ML
BH CV ECHO MEAS - SV(MOD-SP2): 30 ML
BH CV ECHO MEAS - SV(MOD-SP4): 41 ML
BH CV ECHO MEAS - SV(TEICH): 43.6 ML
BH CV ECHO MEAS - TAPSE (>1.6): 2.2 CM2
BH CV ECHO MEAS - TR MAX VEL: 236 CM/SEC
BH CV XLRA - RV BASE: 2.4 CM
BH CV XLRA - TDI S': 13 CM/SEC
BILIRUB SERPL-MCNC: 0.2 MG/DL (ref 0.1–1.2)
BUN BLD-MCNC: 15 MG/DL (ref 8–23)
BUN/CREAT SERPL: 19.2 (ref 7–25)
CALCIUM SPEC-SCNC: 10.2 MG/DL (ref 8.6–10.5)
CHLORIDE SERPL-SCNC: 106 MMOL/L (ref 98–107)
CO2 SERPL-SCNC: 27.3 MMOL/L (ref 22–29)
CREAT BLD-MCNC: 0.78 MG/DL (ref 0.57–1)
CRP SERPL-MCNC: 0.11 MG/DL (ref 0–0.5)
DEPRECATED RDW RBC AUTO: 43.2 FL (ref 37–54)
E/E' RATIO: 15.5
EOSINOPHIL # BLD AUTO: 0.26 10*3/MM3 (ref 0–0.7)
EOSINOPHIL NFR BLD AUTO: 3.8 % (ref 0.3–6.2)
ERYTHROCYTE [DISTWIDTH] IN BLOOD BY AUTOMATED COUNT: 13.7 % (ref 11.7–13)
ERYTHROCYTE [SEDIMENTATION RATE] IN BLOOD: 2 MM/HR (ref 0–30)
GFR SERPL CREATININE-BSD FRML MDRD: 71 ML/MIN/1.73
GLOBULIN UR ELPH-MCNC: 2.7 GM/DL
GLUCOSE BLD-MCNC: 127 MG/DL (ref 65–99)
GLUCOSE BLDC GLUCOMTR-MCNC: 87 MG/DL (ref 70–130)
GLUCOSE BLDC GLUCOMTR-MCNC: 92 MG/DL (ref 70–130)
HCT VFR BLD AUTO: 43.3 % (ref 35.6–45.5)
HGB BLD-MCNC: 14 G/DL (ref 11.9–15.5)
IMM GRANULOCYTES # BLD: 0 10*3/MM3 (ref 0–0.03)
IMM GRANULOCYTES NFR BLD: 0 % (ref 0–0.5)
LEFT ATRIUM VOLUME INDEX: 34.4 ML/M2
LYMPHOCYTES # BLD AUTO: 1.31 10*3/MM3 (ref 0.9–4.8)
LYMPHOCYTES NFR BLD AUTO: 19.2 % (ref 19.6–45.3)
MCH RBC QN AUTO: 28 PG (ref 26.9–32)
MCHC RBC AUTO-ENTMCNC: 32.3 G/DL (ref 32.4–36.3)
MCV RBC AUTO: 86.6 FL (ref 80.5–98.2)
MONOCYTES # BLD AUTO: 0.52 10*3/MM3 (ref 0.2–1.2)
MONOCYTES NFR BLD AUTO: 7.6 % (ref 5–12)
NEUTROPHILS # BLD AUTO: 4.72 10*3/MM3 (ref 1.9–8.1)
NEUTROPHILS NFR BLD AUTO: 69 % (ref 42.7–76)
PLATELET # BLD AUTO: 292 10*3/MM3 (ref 140–500)
PMV BLD AUTO: 11.2 FL (ref 6–12)
POTASSIUM BLD-SCNC: 4.1 MMOL/L (ref 3.5–5.2)
PROT SERPL-MCNC: 7.5 G/DL (ref 6–8.5)
RBC # BLD AUTO: 5 10*6/MM3 (ref 3.9–5.2)
SODIUM BLD-SCNC: 145 MMOL/L (ref 136–145)
WBC NRBC COR # BLD: 6.84 10*3/MM3 (ref 4.5–10.7)

## 2017-02-07 PROCEDURE — 93306 TTE W/DOPPLER COMPLETE: CPT | Performed by: INTERNAL MEDICINE

## 2017-02-07 PROCEDURE — 93005 ELECTROCARDIOGRAM TRACING: CPT | Performed by: EMERGENCY MEDICINE

## 2017-02-07 PROCEDURE — 93010 ELECTROCARDIOGRAM REPORT: CPT | Performed by: INTERNAL MEDICINE

## 2017-02-07 PROCEDURE — 97161 PT EVAL LOW COMPLEX 20 MIN: CPT | Performed by: PHYSICAL THERAPIST

## 2017-02-07 PROCEDURE — 93306 TTE W/DOPPLER COMPLETE: CPT

## 2017-02-07 PROCEDURE — 99221 1ST HOSP IP/OBS SF/LOW 40: CPT | Performed by: PSYCHIATRY & NEUROLOGY

## 2017-02-07 PROCEDURE — 82962 GLUCOSE BLOOD TEST: CPT

## 2017-02-07 PROCEDURE — 80053 COMPREHEN METABOLIC PANEL: CPT | Performed by: EMERGENCY MEDICINE

## 2017-02-07 PROCEDURE — 25010000002 ONDANSETRON PER 1 MG: Performed by: INTERNAL MEDICINE

## 2017-02-07 PROCEDURE — 90732 PPSV23 VACC 2 YRS+ SUBQ/IM: CPT | Performed by: INTERNAL MEDICINE

## 2017-02-07 PROCEDURE — 70553 MRI BRAIN STEM W/O & W/DYE: CPT

## 2017-02-07 PROCEDURE — 70450 CT HEAD/BRAIN W/O DYE: CPT

## 2017-02-07 PROCEDURE — 0 GADOBENATE DIMEGLUMINE 529 MG/ML SOLUTION: Performed by: INTERNAL MEDICINE

## 2017-02-07 PROCEDURE — G0009 ADMIN PNEUMOCOCCAL VACCINE: HCPCS | Performed by: INTERNAL MEDICINE

## 2017-02-07 PROCEDURE — 85025 COMPLETE CBC W/AUTO DIFF WBC: CPT | Performed by: INTERNAL MEDICINE

## 2017-02-07 PROCEDURE — 25010000004 PNEUMOCOCCAL VAC POLYVALENT PER 0.5 ML: Performed by: INTERNAL MEDICINE

## 2017-02-07 PROCEDURE — A9577 INJ MULTIHANCE: HCPCS | Performed by: INTERNAL MEDICINE

## 2017-02-07 PROCEDURE — 85025 COMPLETE CBC W/AUTO DIFF WBC: CPT | Performed by: EMERGENCY MEDICINE

## 2017-02-07 PROCEDURE — 86140 C-REACTIVE PROTEIN: CPT | Performed by: PSYCHIATRY & NEUROLOGY

## 2017-02-07 PROCEDURE — 85652 RBC SED RATE AUTOMATED: CPT | Performed by: PSYCHIATRY & NEUROLOGY

## 2017-02-07 RX ORDER — ASPIRIN 81 MG/1
81 TABLET ORAL DAILY
Status: DISCONTINUED | OUTPATIENT
Start: 2017-02-07 | End: 2017-02-07 | Stop reason: SDUPTHER

## 2017-02-07 RX ORDER — ONDANSETRON 2 MG/ML
4 INJECTION INTRAMUSCULAR; INTRAVENOUS EVERY 6 HOURS PRN
Status: DISCONTINUED | OUTPATIENT
Start: 2017-02-07 | End: 2017-02-08 | Stop reason: HOSPADM

## 2017-02-07 RX ORDER — LEVOTHYROXINE SODIUM 0.07 MG/1
75 TABLET ORAL DAILY
Status: DISCONTINUED | OUTPATIENT
Start: 2017-02-07 | End: 2017-02-08 | Stop reason: HOSPADM

## 2017-02-07 RX ORDER — ERYTHROMYCIN 5 MG/G
OINTMENT OPHTHALMIC EVERY 12 HOURS
Status: DISCONTINUED | OUTPATIENT
Start: 2017-02-07 | End: 2017-02-08 | Stop reason: HOSPADM

## 2017-02-07 RX ORDER — SODIUM CHLORIDE 9 MG/ML
75 INJECTION, SOLUTION INTRAVENOUS CONTINUOUS
Status: DISCONTINUED | OUTPATIENT
Start: 2017-02-07 | End: 2017-02-07

## 2017-02-07 RX ORDER — ATORVASTATIN CALCIUM 80 MG/1
80 TABLET, FILM COATED ORAL NIGHTLY
Status: DISCONTINUED | OUTPATIENT
Start: 2017-02-07 | End: 2017-02-08 | Stop reason: HOSPADM

## 2017-02-07 RX ORDER — ACETAMINOPHEN 325 MG/1
650 TABLET ORAL EVERY 4 HOURS PRN
Status: DISCONTINUED | OUTPATIENT
Start: 2017-02-07 | End: 2017-02-08 | Stop reason: HOSPADM

## 2017-02-07 RX ORDER — SERTRALINE HYDROCHLORIDE 100 MG/1
100 TABLET, FILM COATED ORAL DAILY
Status: DISCONTINUED | OUTPATIENT
Start: 2017-02-07 | End: 2017-02-08 | Stop reason: HOSPADM

## 2017-02-07 RX ORDER — ASPIRIN 325 MG
325 TABLET ORAL DAILY
Status: DISCONTINUED | OUTPATIENT
Start: 2017-02-08 | End: 2017-02-08

## 2017-02-07 RX ORDER — ASPIRIN 300 MG/1
300 SUPPOSITORY RECTAL DAILY
Status: DISCONTINUED | OUTPATIENT
Start: 2017-02-08 | End: 2017-02-08

## 2017-02-07 RX ORDER — MECLIZINE HCL 25MG 25 MG/1
25 TABLET, CHEWABLE ORAL 3 TIMES DAILY PRN
Status: DISCONTINUED | OUTPATIENT
Start: 2017-02-07 | End: 2017-02-08 | Stop reason: HOSPADM

## 2017-02-07 RX ORDER — TETRACAINE HYDROCHLORIDE 5 MG/ML
2 SOLUTION OPHTHALMIC ONCE
Status: COMPLETED | OUTPATIENT
Start: 2017-02-07 | End: 2017-02-07

## 2017-02-07 RX ORDER — SODIUM CHLORIDE 0.9 % (FLUSH) 0.9 %
1-10 SYRINGE (ML) INJECTION AS NEEDED
Status: DISCONTINUED | OUTPATIENT
Start: 2017-02-07 | End: 2017-02-08 | Stop reason: HOSPADM

## 2017-02-07 RX ORDER — MECLIZINE HYDROCHLORIDE 25 MG/1
50 TABLET ORAL 3 TIMES DAILY PRN
Status: DISCONTINUED | OUTPATIENT
Start: 2017-02-07 | End: 2017-02-07

## 2017-02-07 RX ORDER — MECLIZINE HYDROCHLORIDE 25 MG/1
25 TABLET ORAL 3 TIMES DAILY PRN
Status: DISCONTINUED | OUTPATIENT
Start: 2017-02-07 | End: 2017-02-07

## 2017-02-07 RX ORDER — ERYTHROMYCIN 5 MG/G
1 OINTMENT OPHTHALMIC ONCE
Status: COMPLETED | OUTPATIENT
Start: 2017-02-07 | End: 2017-02-07

## 2017-02-07 RX ADMIN — TETRACAINE HYDROCHLORIDE 2 DROP: 5 SOLUTION OPHTHALMIC at 00:45

## 2017-02-07 RX ADMIN — SERTRALINE 100 MG: 100 TABLET, FILM COATED ORAL at 11:56

## 2017-02-07 RX ADMIN — ATORVASTATIN CALCIUM 80 MG: 80 TABLET, FILM COATED ORAL at 20:17

## 2017-02-07 RX ADMIN — ASPIRIN 81 MG: 81 TABLET ORAL at 11:56

## 2017-02-07 RX ADMIN — PNEUMOCOCCAL VACCINE POLYVALENT 0.5 ML
25; 25; 25; 25; 25; 25; 25; 25; 25; 25; 25; 25; 25; 25; 25; 25; 25; 25; 25; 25; 25; 25; 25 INJECTION, SOLUTION INTRAMUSCULAR; SUBCUTANEOUS at 11:56

## 2017-02-07 RX ADMIN — LEVOTHYROXINE SODIUM 75 MCG: 75 TABLET ORAL at 11:56

## 2017-02-07 RX ADMIN — ACETAMINOPHEN 650 MG: 325 TABLET ORAL at 11:56

## 2017-02-07 RX ADMIN — GADOBENATE DIMEGLUMINE 12 ML: 529 INJECTION, SOLUTION INTRAVENOUS at 07:20

## 2017-02-07 RX ADMIN — SODIUM CHLORIDE 75 ML/HR: 9 INJECTION, SOLUTION INTRAVENOUS at 03:54

## 2017-02-07 RX ADMIN — ERYTHROMYCIN 1 APPLICATION: 5 OINTMENT OPHTHALMIC at 01:34

## 2017-02-07 RX ADMIN — FLUORESCEIN SODIUM 1 STRIP: 1 STRIP OPHTHALMIC at 00:45

## 2017-02-07 RX ADMIN — ONDANSETRON 4 MG: 2 INJECTION INTRAMUSCULAR; INTRAVENOUS at 11:56

## 2017-02-07 RX ADMIN — ERYTHROMYCIN: 5 OINTMENT OPHTHALMIC at 12:00

## 2017-02-07 RX ADMIN — SODIUM CHLORIDE 75 ML/HR: 9 INJECTION, SOLUTION INTRAVENOUS at 04:55

## 2017-02-07 NOTE — PLAN OF CARE
Problem: Patient Care Overview (Adult)  Goal: Plan of Care Review  Outcome: Ongoing (interventions implemented as appropriate)    Problem: Fall Risk (Adult)  Goal: Absence of Falls  Outcome: Ongoing (interventions implemented as appropriate)    02/07/17 0855   Fall Risk (Adult)   Absence of Falls making progress toward outcome

## 2017-02-07 NOTE — ED PROVIDER NOTES
EMERGENCY DEPARTMENT ENCOUNTER    CHIEF COMPLAINT  Chief Complaint: L eye pain  History given by: patient, family  History limited by: nothing  Room Number: 24/24  PMD: Silvano Barnhart MD      HPI:  Pt is a 76 y.o. female who presents complaining of L eye pain after she accidentally got hair dye in it. Pt also c/o intermittent dizziness, diplopia, and family states that pt's gaze was dysconjugate but denies new worsened hearing.    Duration:  Onset earlier today  Onset: sudden  Timing: constant  Location: L eye  Radiation: none  Quality: not described  Intensity/Severity: moderate  Progression: unchanged  Associated Symptoms: dizziness, diplopia, dysconjugate gaze  Aggravating Factors: hair dye in eye  Alleviating Factors: none  Previous Episodes: recent hx of dizziness  Treatment before arrival: none    PAST MEDICAL HISTORY  Active Ambulatory Problems     Diagnosis Date Noted   • Atopic rhinitis 01/28/2016   • Arthropathy of knee 01/28/2016   • Depression 01/28/2016   • Gastroesophageal reflux disease 01/28/2016   • Hypothyroidism 01/28/2016   • Insomnia 01/28/2016   • Sialodocholithiasis 01/28/2016   • Irritable bowel syndrome 03/16/2016   • Hypersomnia 05/02/2016   • Vitamin D deficiency 08/30/2016   • Arthritis 10/27/2016   • Lower abdominal pain 12/16/2016   • Bilateral sciatica 12/16/2016   • Lumbar spine pain 02/02/2017   • Fatigue 02/02/2017     Resolved Ambulatory Problems     Diagnosis Date Noted   • No Resolved Ambulatory Problems     Past Medical History   Diagnosis Date   • Allergy desensitization therapy    • Hyperlipidemia    • Sleep apnea        PAST SURGICAL HISTORY  Past Surgical History   Procedure Laterality Date   • Appendectomy     • Cholecystectomy     • Colon surgery     • Hysterectomy         FAMILY HISTORY  Family History   Problem Relation Age of Onset   • Stroke Mother    • Heart disease Father        SOCIAL HISTORY  Social History     Social History   • Marital status: Single      Spouse name: N/A   • Number of children: N/A   • Years of education: N/A     Occupational History   • Not on file.     Social History Main Topics   • Smoking status: Never Smoker   • Smokeless tobacco: Not on file   • Alcohol use Yes      Comment: social   • Drug use: Not on file   • Sexual activity: Not on file     Other Topics Concern   • Not on file     Social History Narrative       ALLERGIES  Amoxicillin-pot clavulanate; Clarithromycin; Levofloxacin; and Pollen extract    REVIEW OF SYSTEMS  Review of Systems   Constitutional: Negative for chills and fever.   HENT: Negative for congestion and sore throat.    Eyes: Positive for pain and visual disturbance (diplopia).        Dysconjugate gaze   Respiratory: Negative for cough and shortness of breath.    Cardiovascular: Negative for chest pain and leg swelling.   Gastrointestinal: Negative for abdominal pain, diarrhea and vomiting.   Genitourinary: Negative for difficulty urinating and dysuria.   Musculoskeletal: Negative for back pain and neck pain.   Skin: Negative for rash and wound.   Allergic/Immunologic: Negative.    Neurological: Positive for dizziness. Negative for weakness, numbness and headaches.   Psychiatric/Behavioral: Negative.    All other systems reviewed and are negative.      PHYSICAL EXAM  ED Triage Vitals   Temp Heart Rate Resp BP SpO2   02/06/17 1756 02/06/17 1756 02/06/17 1756 02/06/17 1756 02/06/17 1756   98.5 °F (36.9 °C) 83 16 202/119 96 %      Temp src Heart Rate Source Patient Position BP Location FiO2 (%)   02/06/17 1756 02/06/17 1756 02/06/17 1756 -- --   Tympanic Monitor Sitting         Physical Exam   Constitutional: She is oriented to person, place, and time and well-developed, well-nourished, and in no distress. No distress.   HENT:   Head: Normocephalic and atraumatic.   Eyes: EOM are normal. Pupils are equal, round, and reactive to light. Left conjunctiva is injected.   Slit lamp exam:       The left eye shows corneal abrasion.    R eye will not cross midline to the left. Small abrasion to middle of L cornea.   Neck: Normal range of motion. Neck supple.   Cardiovascular: Normal rate, regular rhythm and normal heart sounds.    Pulmonary/Chest: Effort normal and breath sounds normal. No respiratory distress.   Abdominal: Soft. There is no tenderness. There is no rebound and no guarding.   Musculoskeletal: Normal range of motion. She exhibits no edema.   Neurological: She is alert and oriented to person, place, and time. She has normal sensation and normal strength.   Skin: Skin is warm and dry. No rash noted.   Psychiatric: Mood and affect normal.   Nursing note and vitals reviewed.      LAB RESULTS  Lab Results (last 24 hours)     Procedure Component Value Units Date/Time    CBC & Differential [[id number]] Collected:  02/07/17 0047    Specimen:  Blood Updated:  02/07/17 0057    Narrative:       The following orders were created for panel order CBC & Differential.  Procedure                               Abnormality         Status                     ---------                               -----------         ------                     CBC Auto Differential[[id number]]         Abnormal            Final result                 Please view results for these tests on the individual orders.    Comprehensive Metabolic Panel [[id number]]  (Abnormal) Collected:  02/07/17 0047    Specimen:  Blood Updated:  02/07/17 0120     Glucose 127 (H) mg/dL      BUN 15 mg/dL      Creatinine 0.78 mg/dL      Sodium 145 mmol/L      Potassium 4.1 mmol/L      Chloride 106 mmol/L      CO2 27.3 mmol/L      Calcium 10.2 mg/dL      Total Protein 7.5 g/dL      Albumin 4.80 g/dL      ALT (SGPT) 15 U/L      AST (SGOT) 24 U/L      Alkaline Phosphatase 91 U/L      Total Bilirubin 0.2 mg/dL      eGFR Non African Amer 71 mL/min/1.73      Globulin 2.7 gm/dL      A/G Ratio 1.8 g/dL      BUN/Creatinine Ratio 19.2      Anion Gap 11.7 mmol/L     Narrative:       The MDRD GFR formula is  only valid for adults with stable renal function between ages 18 and 70.    CBC Auto Differential [[id number]]  (Abnormal) Collected:  02/07/17 0047    Specimen:  Blood Updated:  02/07/17 0057     WBC 6.84 10*3/mm3      RBC 5.00 10*6/mm3      Hemoglobin 14.0 g/dL      Hematocrit 43.3 %      MCV 86.6 fL      MCH 28.0 pg      MCHC 32.3 (L) g/dL      RDW 13.7 (H) %      RDW-SD 43.2 fl      MPV 11.2 fL      Platelets 292 10*3/mm3      Neutrophil % 69.0 %      Lymphocyte % 19.2 (L) %      Monocyte % 7.6 %      Eosinophil % 3.8 %      Basophil % 0.4 %      Immature Grans % 0.0 %      Neutrophils, Absolute 4.72 10*3/mm3      Lymphocytes, Absolute 1.31 10*3/mm3      Monocytes, Absolute 0.52 10*3/mm3      Eosinophils, Absolute 0.26 10*3/mm3      Basophils, Absolute 0.03 10*3/mm3      Immature Grans, Absolute 0.00 10*3/mm3         I ordered the above labs and reviewed the results    RADIOLOGY  CT Head Without Contrast    (Results Pending)     I ordered the above noted radiological studies. Interpreted by radiologist. Reviewed by me in PACS.       PROCEDURES  Procedures  EKG           EKG time: 0138  Rhythm/Rate: NSR rate of 78  P waves and [place]: normal  QRS, axis: normal   ST and T waves: normal     Interpreted Contemporaneously by me, independently viewed  No change compared to prior 7/4/06      PROGRESS AND CONSULTS  ED Course     0017  D/w pt and family plan to admit pt for further evaluation and tx to r/o stroke. Pt and family understand and agree with the plan. All questions answered.    0019  Ordered blood work, EKG, CT head for further evaluation.    0032  Rechecked pt who is resting comfortably. Examined eyes    0113  Ordered erythromycin ointment for eye abrasion. Placed call to [facility] to admit pt.    0131  Call returned from Dr. Vargas ([facility]) who agrees to admit pt to neuro.    0142  Rechecked pt who is resting comfortably. D/w pt and family plan to admit for further evaluation and tx. Pt and family understand and agree  with the plan. All questions answered.    MEDICAL DECISION MAKING  Results were reviewed/discussed with the patient and they were also made aware of online access. Pt also made aware that some labs, such as cultures, will not be resulted during ER visit and follow up with PMD is necessary.     MDM  Number of Diagnoses or Management Options  Corneal abrasion, left, initial encounter:   Diplopia:   Dizziness:   Dysconjugate gaze:      Amount and/or Complexity of Data Reviewed  Clinical lab tests: reviewed and ordered (Lab work unremarkable.)  Tests in the radiology section of CPT®: ordered and reviewed  Tests in the medicine section of CPT®: ordered and reviewed (See EKG procedure note.)  Decide to obtain previous medical records or to obtain history from someone other than the patient: yes  Review and summarize past medical records: yes (Pt was here 2/4/17 for dizziness, treated w/ antivert and d/c. Negative CT head. Lab work on 2/1/17 was unremarkable.)  Discuss the patient with other providers: yes (Dr. Arenas, hospitalist)  Independent visualization of images, tracings, or specimens: yes    Patient Progress  Patient progress: stable    Alteplase (tPA) Inclusion / Exclusion Criteria    Time: 1:41 AM  Person Administering Scale: Les Solange    Inclusion Criteria  no Diagnosis of acute ischemic stroke.   no Onset of symptoms < 4.5 hours before beginning treatment (stroke onset = time patient was last seen well or without symptoms).   yes 18 years of age or greater.   Exclusion criteria (Contraindications) for onset < 3 hrs.   no Serious head trauma < 3 months.   no Symptoms suggestive of subarachnoid hemorrhage.   no Arterial stick at non-compressible site in previous 14 days.   no Current intracranial hemorrhage.   no Some intracranial neoplasm, AV malformation, or aneurysm.   no Recent intracranial or intraspinal surgery < 3 months.   no Current Uncontrolled Hypertension.   no CT demonstrates multi-lobar  infarction (hypodensity > 1/3 cerebral hemisphere).   no Active internal bleeding.   no Active bleeding diathesis.   Relative exclusion for all patients.  no Pregnancy.   no Major surgery or previous trauma within previous 14 days.   no Recent GI or urinary tract hemorrhage (within previous 21 days).   no Recent acute MI (within previous 3 months).   no Subacute/Acute Bacterial Endocarditis / Acute Pericarditis   Additional exclusions for patients with symptoms onset between 3 and 4.5 hours.  no Age > 80.   no Severe stroke (NIHSS > 25).   no History of BOTH diabetes and prior ischemic stroke.   no On any anticoagulants regardless of INR.    >>> Warfarin (Coumadin), Heparin, Enoxaparin (Lovenox), fondaparinux (Arixtra), bivalirudin (Angiomax), Argatroban, dabigatran (Pradaxa), rivaroxaban (Xarelto), or apixaban (Eliquis)   tPA is not indicated as it is > 3 hours since onset of sx.       DIAGNOSIS  Final diagnoses:   Diplopia   Dysconjugate gaze   Corneal abrasion, left, initial encounter   Dizziness       DISPOSITION  ADMISSION TO NEURO    Discussed treatment plan and reason for admission with pt/family and admitting physician.  Pt/family voiced understanding of the plan for admission for further testing/treatment as needed.     Latest Documented Vital Signs:  As of 1:41 AM  BP- (!) 187/89 HR- 76 Temp- 99 °F (37.2 °C) (Tympanic) O2 sat- 95%    --  Documentation assistance provided by brandi Narvaez for Dr. Crawford.  Information recorded by the brandi was done at my direction and has been verified and validated by me.     Les Narvaez  02/07/17 0148       Eyal Crawford MD  02/07/17 0344

## 2017-02-07 NOTE — THERAPY DISCHARGE NOTE
Acute Care - Physical Therapy Initial Eval/Discharge  [facility]     Patient Name: Sheila Burleson  : 1940  MRN: [id number]  Today's Date: 2017                Admit Date: 2017    Visit Dx:    ICD-10-CM ICD-9-CM   1. Diplopia H53.2 368.2   2. Dysconjugate gaze H51.8 378.87   3. Corneal abrasion, left, initial encounter S05.02XA 918.1   4. Dizziness R42 780.4     Patient Active Problem List   Diagnosis   • Atopic rhinitis   • Arthropathy of knee   • Depression   • Gastroesophageal reflux disease   • Hypothyroidism   • Insomnia   • Sialodocholithiasis   • Irritable bowel syndrome   • Hypersomnia   • Vitamin D deficiency   • Arthritis   • Lower abdominal pain   • Bilateral sciatica   • Lumbar spine pain   • Fatigue   • Diplopia   • Acute CVA (cerebrovascular accident)     Past Medical History   Diagnosis Date   • Allergy desensitization therapy      gets shot weekly   • Hyperlipidemia    • Sleep apnea      Past Surgical History   Procedure Laterality Date   • Appendectomy     • Cholecystectomy     • Colon surgery     • Hysterectomy            PT ASSESSMENT (last 72 hours)      PT Evaluation       17 1424 17 1353    Rehab Evaluation    Document Type evaluation;discharge evaluation/summary  -     Subjective Information agree to therapy;no complaints  -     Evaluation Not Performed  other (see comments)   SLP and RN discussed POC. Pt passed swallow screen and tolerating regular and thin. Bedside swallow evaluation not warranted at this time. RN to cancel order. Please re-consult as appropriate.  -OH    Patient Effort, Rehab Treatment good  -KH     Symptoms Noted During/After Treatment none  -KH     General Information    General Observations in bed  -     Pertinent History Of Current Problem diplopia  -     Precautions/Limitations fall precautions  -     Prior Level of Function independent:  -KH     Equipment Currently Used at Home none  -KH     Plans/Goals Discussed With patient  -KH      Living Environment    Number of Stairs to Enter Home 3  -KH     Clinical Impression    Patient/Family Goals Statement return home  -     Criteria for Skilled Therapeutic Interventions Met current level of function same as previous level of function  -     Pain Assessment    Pain Assessment No/denies pain  -     Cognitive Assessment/Intervention    Current Cognitive/Communication Assessment functional  -     Orientation Status oriented x 4  -KH     Follows Commands/Answers Questions 100% of the time  -     Personal Safety WNL/WFL  -     Personal Safety Interventions fall prevention program maintained;gait belt  -     ROM (Range of Motion)    General ROM Detail [facility]  -     MMT (Manual Muscle Testing)    General MMT Assessment Detail [facility]  -     Bed Mobility, Assessment/Treatment    Bed Mob, Supine to Sit, Swisher independent  -     Bed Mob, Sit to Supine, Swisher independent  -     Transfer Assessment/Treatment    Transfers, Sit-Stand Swisher supervision required  -     Transfers, Stand-Sit Swisher supervision required  -     Gait Assessment/Treatment    Gait, Swisher Level contact guard assist;supervision required  -     Gait, Distance (Feet) 200  -KH     Gait, Comment slightly unsteady initially, progressed to supervision  -     Stairs Assessment/Treatment    Number of Stairs 3  -KH     Stairs, Handrail Location left side (ascending)  -     Stairs, Swisher Level --   SBA  -     Positioning and Restraints    Pre-Treatment Position in bed  -     Post Treatment Position bed  -     In Bed supine;call light within reach;encouraged to call for assist  -       02/07/17 0946 02/07/17 0453    Rehab Evaluation    Evaluation Not Performed patient unavailable for evaluation   Pt admitted last night, no H+P at this time.  Plan to check back in pm.   -AR     Living Environment    Lives With  alone  -LN    Living Arrangements  condominium  -LN    Home  Accessibility  no concerns  -LN    Stair Railings at Home  inside, present at both sides;outside, present at both sides  -LN    Type of Financial/Environmental Concern  none  -LN    Transportation Available  car;family or friend will provide  -LN      02/07/17 0451       General Information    Equipment Currently Used at Home none  -LN       User Key  (r) = Recorded By, (t) = Taken By, (c) = Cosigned By    Initials Name Provider Type    ANN Dent, YONATHAN Physical Therapist    JASWINDER Vela MA,CCC-SLP Speech and Language Pathologist    ELVA Dobson RN Registered Nurse    LEONELA Monge, PT Physical Therapist              PT Recommendation and Plan  Anticipated Discharge Disposition: home  PT Frequency: evaluation only  Plan of Care Review  Outcome Summary/Follow up Plan: Pt appears to functioning at baseline level of mobility and is safe to d/c home with family when medically stable. PT will sign off. Encouraged pt to ambulate in hallways with supervision while admitted.               Outcome Measures       02/07/17 1400          How much help from another person do you currently need...    Turning from your back to your side while in flat bed without using bedrails? 4  -KH      Moving from lying on back to sitting on the side of a flat bed without bedrails? 4  -KH      Moving to and from a bed to a chair (including a wheelchair)? 4  -KH      Standing up from a chair using your arms (e.g., wheelchair, bedside chair)? 4  -KH      Climbing 3-5 steps with a railing? 3  -KH      To walk in hospital room? 4  -KH      AM-PAC 6 Clicks Score 23  -KH      Functional Assessment    Outcome Measure Options AM-PAC 6 Clicks Basic Mobility (PT)  -KH        User Key  (r) = Recorded By, (t) = Taken By, (c) = Cosigned By    Initials Name Provider Type    ANN Dent, PT Physical Therapist           Time Calculation:         PT Charges       02/07/17 1438 02/07/17 0922       Time Calculation     Start Time 1405  -      Stop Time 1413  -      Time Calculation (min) 8 min  -      PT - Next Appointment  02/07/17  -AR       User Key  (r) = Recorded By, (t) = Taken By, (c) = Cosigned By    Initials Name Provider Type    ANN Dent, PT Physical Therapist    LEONELA Monge PT Physical Therapist          Therapy Charges for Today     Code Description Service Date Service Provider Modifiers Qty    38803158086 HC PT EVAL LOW COMPLEXITY 1 2/7/2017 Lyla Dent, PT GP 1          PT G-Codes  Outcome Measure Options: AM-PAC 6 Clicks Basic Mobility (PT)    PT Discharge Summary  Anticipated Discharge Disposition: home  Reason for Discharge: At baseline function  Discharge Destination: Home    Lyla Dent, PT  2/7/2017

## 2017-02-07 NOTE — PLAN OF CARE
Problem: Patient Care Overview (Adult)  Goal: Plan of Care Review  Outcome: Ongoing (interventions implemented as appropriate)    02/07/17 0639   Coping/Psychosocial Response Interventions   Plan Of Care Reviewed With patient   Patient Care Overview   Progress improving   Outcome Evaluation   Outcome Summary/Follow up Plan Vital signs stable with high BP noted, MD aware; no complaints of pain, dysconjugate, or double vision at this time; Neuro MD consulted and hospitalist to see today; MRI today; NIH 0; IVF and eye drops as ordered; will continue to monitor.          Problem: Fall Risk (Adult)  Goal: Identify Related Risk Factors and Signs and Symptoms  Outcome: Outcome(s) achieved Date Met:  02/07/17  Goal: Absence of Falls  Outcome: Ongoing (interventions implemented as appropriate)

## 2017-02-07 NOTE — CONSULTS
Neuro  Dictated    BESSIE Arizmendi MD     REQUESTING PHYSICIAN:  MAVERICK Marx MD    CONSULTING PHYSICIAN:  Carlos Eduardo Arizmendi MD    CHIEF COMPLAINT:  Double vision.    DIAGNOSIS:  Brain stem infarct.     HISTORY: This is a 76-year-old woman who was in her normal state of health until Saturday.  Shortly after getting out of bed she noted double vision.  It was horizontal double vision.  It went away if she covered either eye.  She also felt off balance. Her daughter thought she was falling more to the left when the daughter came over at the patient's request.  They took her to the emergency room. They felt it was not a central process and gave her scopolamine patch and released her.  She went and got the patch at the pharmacy and when she got home, put it on and around that time her symptoms resolved.  Therefore it lasted for about 6 hours from about 10:30 a.m. to 4:30 p.m.     The next day which was Sunday she was asymptomatic and then on Monday at about 2 a.m. the double vision recurred.  At that point she apparently had been dying her hair and some of the dye got into her left eye and it was very irritating she rubbed the eye and it hurt for quite a while.  She once again felt unsteady on her feet and presented to the emergency room on Monday (yesterday) with the complaint of double vision and gait unsteadiness and she was subsequently admitted to the hospital.  She was found to have a corneal abrasion possibly related to rubbing her eye in relationship to this dye but I do not have the details on that.  It is feeling a lot better now.    Interestingly the daughter took a video of her eye movements and in particular said that she captured the right eye being unable to adduct before they came into the hospital on Monday and the daughter thinks that has improved since.      The patient denies any localizing numbness or weakness in her limbs or face.  There is no unilateral loss of vision.  She has no head or neck pain.  There  has been no recent trauma.  She has never had a stroke in the past.    The severity is moderate.  The location of the eye movement abnormality is the right eye would not adduct.  The duration was 2 episodes each lasting 6-8 hours.  She is presently asymptomatic.  This is in the context of a woman who has no known vascular disease and has otherwise been in good health.  There are no modifying factors.  She has associated symptom of gait instability.    PAST MEDICAL HISTORY:   1.  Depression.  2.  GERD.    3.  Hypothyroidism.  4.  Irritable bowel syndrome.    MEDICATIONS:  1.  Ambien.  2.  Zoloft.  3.  Mobic.  4.  Antivert.  5.  Synthroid.  6.  Flonase.  7.  Aspirin 81 mg.     SOCIAL HISTORY:  No alcohol or drug abuse.  She  lives by herself.      FAMILY HISTORY:  Reviewed and noncontributory to the present illness.      REVIEW OF SYSTEMS:  A 14 system review is performed and other than double vision and gait unsteadiness noted all other systems are negative.    PHYSICAL EXAMINATION:   GENERAL APPEARANCE:  Exam reveals a woman who appears her stated age in no acute distress.    VITAL SIGNS:  Temperature of 98.1, pulse 71, respirations 16, blood pressure 169/91.  It is of note that the first blood pressure was 202/119 when she p resented to the emergency room.    NECK:  Carotids are equal without bruits.    CARDIAC:  Regular rate and rhythm with no significant murmur or gallop.    EXTREMITIES: Nonedematous.  Pulses are intact. There are no signs of peripheral embolization.  SKIN: There is no rash.    ABDOMEN:  There is no hepatosplenomegaly.    LUNGS:  There are no labored respirations.   NEUROLOGIC:  On neurologic exam she is awake, alert, and oriented x3.  Visual fields are full.  Discs are flat.  The left pupil is larger than the right.  The left is about 5 mm, the right 3 mm.  The right reacts slightly.  The left, I cannot see any definite reaction (she has had cataract resections bilaterally).  The left  conjunctiva is slightly injected and the lids are slightly swollen giving the appearance of a ptosis to the upper and lower lids but I do not think it is neurologic.  She has a XT on left gaze confirmed by red glass which is clearly present but not severe.  There is no nystagmus.  Gaze to the right reveals full eye movements.  Up and down gaze is intact.  There is no skew deviation.  I watched the video from the daughters cell phone that clearly confirms an adduction defect in the right eye that is worse than when I examined her.  Otherwise cranial nerves II-XII are intact.  Power is normal in all 4 extremities.  Tone is normal.  Reflexes are 2 and symmetric in the upper and lower extremities.  Toes are downgoing.  Sensation is intact to pin and joint position sense in all 4 extremities.  Cerebellar function is intact in all 4 extremities.  Her gait is really pretty good this morning but she does have a slight, but I think reproducible tendency to fall to the right.      DIAGNOSTIC DATA:  I reviewed the MRI of the brain and I agree it shows no significant abnormalities, particularly no acute infarcts.    IMPRESSION:  Despite the negative MRI, I think we must conclude that this is an acute brainstem infarct.  In particular, I think the physical examination is indicative of a right internuclear ophthalmoplegia which is further confirmed by the video of the eye movements supplied by the daughter.  I do not see any other way to explain this.  It is well known that 10-20% of small brain stem infarcts can be missed on MRI and I think that is what we are dealing with here.      I am not entirely sure how to explain the fact that the left pupil is larger, perhaps this is an old finding.  I think the apparent ptosis is from irritation from the dye getting in her eyes and rubbing it and injuring her cornea apparently.     Regardless, at this point I am going to get a 3T MRI in the morning. In addition to work this up from a  circulation perspective, we will get an MRA of the head and neck and also get an echocardiogram.  She is already on aspirin and I am going to add Lipitor 80 mg daily at this point.    Regardless, I think she is going to recover well from this.  Thank you for allowing me to see the patient.

## 2017-02-07 NOTE — SIGNIFICANT NOTE
02/07/17 0946   Rehab Treatment   Discipline physical therapist   Rehab Evaluation   Evaluation Not Performed patient unavailable for evaluation  (Pt admitted last night, no H+P at this time.  Plan to check back in pm. )   Recommendation   PT - Next Appointment 02/07/17

## 2017-02-07 NOTE — ED NOTES
At re-eval patient reports that her pain level is the same. Patient's left eye is slightly less swollen than at initial triage.      Holly Rivera RN  02/06/17 8111

## 2017-02-07 NOTE — PLAN OF CARE
Problem: Patient Care Overview (Adult)  Goal: Plan of Care Review    02/07/17 1422   Outcome Evaluation   Outcome Summary/Follow up Plan Pt appears to functioning at baseline level of mobility and is safe to d/c home with family when medically stable. PT will sign off. Encouraged pt to ambulate in hallways with supervision while admitted.

## 2017-02-07 NOTE — PROGRESS NOTES
Neuro    Exam is highly suggestive of a right ARIAN and with CC of diplopia we must assume this is a amll right brainstem infarct despite negative MRI.    Plan  MRI on 3 T in AM  MRA of head and neck  Echo  ASA and feliciano Arizmendi MD

## 2017-02-08 ENCOUNTER — APPOINTMENT (OUTPATIENT)
Dept: MRI IMAGING | Facility: HOSPITAL | Age: 77
End: 2017-02-08
Attending: PSYCHIATRY & NEUROLOGY

## 2017-02-08 VITALS
SYSTOLIC BLOOD PRESSURE: 166 MMHG | DIASTOLIC BLOOD PRESSURE: 101 MMHG | RESPIRATION RATE: 16 BRPM | HEIGHT: 62 IN | WEIGHT: 134 LBS | OXYGEN SATURATION: 91 % | TEMPERATURE: 98.4 F | BODY MASS INDEX: 24.66 KG/M2 | HEART RATE: 75 BPM

## 2017-02-08 PROBLEM — I10 HYPERTENSION: Status: ACTIVE | Noted: 2017-02-08

## 2017-02-08 LAB
ANION GAP SERPL CALCULATED.3IONS-SCNC: 14.8 MMOL/L
BASOPHILS # BLD AUTO: 0.05 10*3/MM3 (ref 0–0.2)
BASOPHILS NFR BLD AUTO: 0.7 % (ref 0–1.5)
BUN BLD-MCNC: 15 MG/DL (ref 8–23)
BUN/CREAT SERPL: 18.3 (ref 7–25)
CALCIUM SPEC-SCNC: 9.6 MG/DL (ref 8.6–10.5)
CHLORIDE SERPL-SCNC: 103 MMOL/L (ref 98–107)
CHOLEST SERPL-MCNC: 229 MG/DL (ref 0–200)
CO2 SERPL-SCNC: 25.2 MMOL/L (ref 22–29)
CREAT BLD-MCNC: 0.82 MG/DL (ref 0.57–1)
DEPRECATED RDW RBC AUTO: 46 FL (ref 37–54)
EOSINOPHIL # BLD AUTO: 0.28 10*3/MM3 (ref 0–0.7)
EOSINOPHIL NFR BLD AUTO: 3.9 % (ref 0.3–6.2)
ERYTHROCYTE [DISTWIDTH] IN BLOOD BY AUTOMATED COUNT: 14.1 % (ref 11.7–13)
GFR SERPL CREATININE-BSD FRML MDRD: 67 ML/MIN/1.73
GLUCOSE BLD-MCNC: 107 MG/DL (ref 65–99)
HBA1C MFR BLD: 5.23 % (ref 4.8–5.6)
HCT VFR BLD AUTO: 45.1 % (ref 35.6–45.5)
HDLC SERPL-MCNC: 48 MG/DL (ref 40–60)
HGB BLD-MCNC: 14.4 G/DL (ref 11.9–15.5)
IMM GRANULOCYTES # BLD: 0.02 10*3/MM3 (ref 0–0.03)
IMM GRANULOCYTES NFR BLD: 0.3 % (ref 0–0.5)
LDLC SERPL CALC-MCNC: 149 MG/DL (ref 0–100)
LDLC/HDLC SERPL: 3.11 {RATIO}
LYMPHOCYTES # BLD AUTO: 1.42 10*3/MM3 (ref 0.9–4.8)
LYMPHOCYTES NFR BLD AUTO: 19.8 % (ref 19.6–45.3)
MCH RBC QN AUTO: 28.3 PG (ref 26.9–32)
MCHC RBC AUTO-ENTMCNC: 31.9 G/DL (ref 32.4–36.3)
MCV RBC AUTO: 88.8 FL (ref 80.5–98.2)
MONOCYTES # BLD AUTO: 0.53 10*3/MM3 (ref 0.2–1.2)
MONOCYTES NFR BLD AUTO: 7.4 % (ref 5–12)
NEUTROPHILS # BLD AUTO: 4.86 10*3/MM3 (ref 1.9–8.1)
NEUTROPHILS NFR BLD AUTO: 67.9 % (ref 42.7–76)
PLATELET # BLD AUTO: 306 10*3/MM3 (ref 140–500)
PMV BLD AUTO: 11.7 FL (ref 6–12)
POTASSIUM BLD-SCNC: 3.8 MMOL/L (ref 3.5–5.2)
RBC # BLD AUTO: 5.08 10*6/MM3 (ref 3.9–5.2)
SODIUM BLD-SCNC: 143 MMOL/L (ref 136–145)
TRIGL SERPL-MCNC: 159 MG/DL (ref 0–150)
VLDLC SERPL-MCNC: 31.8 MG/DL (ref 5–40)
WBC NRBC COR # BLD: 7.16 10*3/MM3 (ref 4.5–10.7)

## 2017-02-08 PROCEDURE — 80048 BASIC METABOLIC PNL TOTAL CA: CPT | Performed by: INTERNAL MEDICINE

## 2017-02-08 PROCEDURE — 83036 HEMOGLOBIN GLYCOSYLATED A1C: CPT | Performed by: PSYCHIATRY & NEUROLOGY

## 2017-02-08 PROCEDURE — 70549 MR ANGIOGRAPH NECK W/O&W/DYE: CPT

## 2017-02-08 PROCEDURE — 80061 LIPID PANEL: CPT | Performed by: PSYCHIATRY & NEUROLOGY

## 2017-02-08 PROCEDURE — A9577 INJ MULTIHANCE: HCPCS | Performed by: INTERNAL MEDICINE

## 2017-02-08 PROCEDURE — 0 GADOBENATE DIMEGLUMINE 529 MG/ML SOLUTION: Performed by: INTERNAL MEDICINE

## 2017-02-08 PROCEDURE — 70553 MRI BRAIN STEM W/O & W/DYE: CPT

## 2017-02-08 PROCEDURE — 70544 MR ANGIOGRAPHY HEAD W/O DYE: CPT

## 2017-02-08 RX ORDER — CLOPIDOGREL BISULFATE 75 MG/1
75 TABLET ORAL DAILY
Status: DISCONTINUED | OUTPATIENT
Start: 2017-02-09 | End: 2017-02-08 | Stop reason: HOSPADM

## 2017-02-08 RX ORDER — CLOPIDOGREL BISULFATE 75 MG/1
75 TABLET ORAL DAILY
Qty: 30 TABLET | Refills: 0 | Status: SHIPPED | OUTPATIENT
Start: 2017-02-09 | End: 2017-03-10 | Stop reason: SDUPTHER

## 2017-02-08 RX ORDER — ATORVASTATIN CALCIUM 80 MG/1
80 TABLET, FILM COATED ORAL NIGHTLY
Qty: 30 TABLET | Refills: 0 | Status: SHIPPED | OUTPATIENT
Start: 2017-02-08 | End: 2017-03-10 | Stop reason: SDUPTHER

## 2017-02-08 RX ADMIN — ACETAMINOPHEN 650 MG: 325 TABLET ORAL at 09:15

## 2017-02-08 RX ADMIN — LEVOTHYROXINE SODIUM 75 MCG: 75 TABLET ORAL at 09:15

## 2017-02-08 RX ADMIN — ASPIRIN 325 MG: 325 TABLET ORAL at 09:15

## 2017-02-08 RX ADMIN — ERYTHROMYCIN: 5 OINTMENT OPHTHALMIC at 00:13

## 2017-02-08 RX ADMIN — ERYTHROMYCIN: 5 OINTMENT OPHTHALMIC at 13:24

## 2017-02-08 RX ADMIN — ATORVASTATIN CALCIUM 80 MG: 80 TABLET, FILM COATED ORAL at 21:00

## 2017-02-08 RX ADMIN — GADOBENATE DIMEGLUMINE 12 ML: 529 INJECTION, SOLUTION INTRAVENOUS at 08:35

## 2017-02-08 RX ADMIN — SERTRALINE 100 MG: 100 TABLET, FILM COATED ORAL at 09:15

## 2017-02-08 NOTE — H&P
HISTORY OF PRESENT ILLNESS: The patient is a 76-year-old white female who had a sudden onset of diplopia that occurred on 02/04/2017. She said that she had been lying down and she got up and it came on at that point she had a very off balance feeling with it. She ended up coming to the emergency room and somehow the history appeared to be that of vertigo, so she was seen. A CT of the head was obtained which was negative and she was subsequently discharged home on meclizine. The following day she actually felt better, although she still felt off balance. She was not noticing any of the diplopia at that point, but then yesterday it came back again and at that point she was not doing anything at all when it developed, and so she came back to the emergency room late last night and was subsequently admitted. She actually has already had an MRI of the brain done that did not show a stroke, but there are definite neurologic changes on her exam and Dr. Arizmendi has already seen the patient and after examining her felt that this was all consistent with a small right brain stem infarct despite the negative MRI. He has ordered an echocardiogram and MRA of the head and neck. When I saw the patient late this afternoon she said that she was not noticing any of the diplopia at that time. She did have a headache on the 1st day this started, but the headache she describes was over her cheeks just below her eyes and then some across the frontal area. She said she gets what she calls sinus headaches, but when I asked her where her sinus headaches are located she pointed again to the area over her cheeks where the maxillary sinuses would be.     PAST MEDICAL HISTORY:  1.  Hyperlipidemia.   2.  Hypothyroidism.   3.  Osteoarthritis.   4.  Irritable bowel syndrome.   5.  Multilevel degenerative disk disease.   6.  Previous history of sialodocholithiasis.  7.  Allergic rhinitis with ongoing immunotherapy.     PAST SURGICAL HISTORY:  1.   Previous appendectomy.  2.  Cholecystectomy.  3.  Hysterectomy.  4.  Bilateral cataract extractions with lens implants.  5.  Previous colon resection because of diverticulitis with a colostomy placement and subsequent reversal.     ALLERGIES:  1.  AMOXICILLIN.  2.  LEVAQUIN.  3.  CLARITHROMYCIN (nausea).  4.  POLLEN EXTRACT.    HOME MEDICATIONS:  1.  Aspirin 81 mg p.o. daily.   2.  Vitamin D2, take 2000 international units p.o. daily.   3.  Flonase nasal spray 2 sprays each nostril daily.   4.  Levothyroxine 75 mcg daily.   5. Antivert 50 mg t.i.d. p.r.n., which was just prescribed on 2017.   6.  Meloxicam 15 mg p.o. daily.   7.  Zoloft 100 mg p.o. daily.   8.  Ambien 5 mg p.o. at bedtime p.r.n. sleep. On the record it said that she has been taking it daily, but when the nurse had asked her about it the patient had told her that she was not taking it on a daily basis.     SOCIAL HISTORY: The patient has never smoked. She has rare alcohol intake.     FAMILY HISTORY: Her mother  in her 80s of a stroke. Father  at 92 years old. He had some mild heart disease, but he actually  from multiple recurrent strokes.    REVIEW OF SYSTEMS: The patient denies any history of previous heart disease. He has had no chest pain or shortness of breath. She does report that she is supposed to be having a sleep study done because of suspected sleep apnea, but has not had that done yet so it is not definitely known if that is the case. She has had no recent fevers. She had a cough about 2 or 3 weeks ago that took her about 2 weeks to clear up but is gone now. No recent fevers. She has had no skin rashes. She does have the headaches as previously mentioned. She has had no problems as far as indigestion or heartburn. No chest pain or shortness of breath. Bowel movements are normal. She has had no problems as far as urinating with no dysuria. No problems with lower extremity edema.     PHYSICAL EXAMINATION:   GENERAL: The  patient is a well-developed, well-nourished white female in no acute distress. She is alert and oriented.   VITAL SIGNS: Her initial vital signs in the emergency room: Temperature 98.5, heart rate 83, respiratory rate 16, blood pressure 202/119, O2 saturation on room air was 96%. At the time that I saw her, temperature was 98.4, heart rate 89, respiratory rate 20, blood pressure 161/76, room air O2 saturation was 97%. The patients left pupil was larger than the right, it did react to light although it seemed to me that it initially constricted and then dilated back up again. The right pupil did appear round and did react appropriately to light. Extraocular eye movements were intact. The sclerae are anicteric. Oropharynx was benign. Tongue was midline. Palate elevates symmetrically.   NECK: Supple without adenopathy or thyromegaly.   LUNGS: Clear with equal breath sounds bilaterally. Breathing was nonlabored.   HEART: Regular rate and rhythm without murmur, gallop or rub.   ABDOMEN: Soft, nontender. Bowel sounds present throughout. No palpable organomegaly.   EXTREMITIES: No cyanosis, clubbing, or edema. Dorsalis pedis pulses were palpable bilaterally.   SKIN: Warm and dry.   NEUROLOGICAL: Speech was fluent. She appeared alert and she was oriented in providing history; she did at times miss what some of the dates were and provided some inaccurate information which the family was able to correct. This was very minor collections in terms of it being Friday rather than Thursday when her symptoms had started, etc. She had the above-mentioned discrepancy in size of her pupils with the left one being larger than the right one, which she denied any history of that previously. I did not do any further neurologic exam since it had already been done by Dr. Arizmendi.    DIAGNOSTIC DATA: CBC was unremarkable as was the chemistry profile other than a blood sugar of 127. MRI of the brain with and without contrast showed mild to  moderate changes of chronic small vessel ischemic phenomenon and old lacunar disease, otherwise no evidence for acute intracranial pathology.     ASSESSMENT:  1.  Acute CVA based on clinical findings. The patient has a new neurologic deficit and even though the MRI does not show an infarct, the findings on exam are consistent with an acute infarct which is likely fairly small. As mentioned, Dr. Arizmendi has seen the patient and ordered CT angiogram to be done. The patient reported that she has never been told previously that there was any asymmetry between her pupils and the family has never noticed that, so I cannot imagine that there is any other explanation other than a new stroke.  2.  Hypertension. Her blood pressures have been pretty elevated on admission and since then, but that would also be consistent with an acute CVA. The more recent blood pressure readings were starting to improve some.   3.  Diplopia related to acute CVA.     PLAN: As noted, Dr. Arizmendi has seen the patient and ordered a CT angiogram. There is no mistaking the fact that there is this discrepancy between the pupil sizes, so she definitely has a new neurologic deficit and this represents a stroke. Her diplopia has improved and we will continue to monitor for any other neurologic deficits. She actually was taken down for her echocardiogram while I was in to see her, so that is being completed and we will be awaiting those results. She will need monitoring for cardiac arrhythmias. She gives no symptoms of palpitations, but at her age atrial fibrillation is certainly a high probability and often a cause of strokes. She will be monitored neurologically. Her blood pressure will not be treated unless it gets significantly more elevated than where it is presently. I expect it will start to improve on its own. She has never previously had problems with her blood pressure and is not on any blood pressure medication. Further management will proceed  from mao.        MAVERICK Marx M.D.  EDL:co  D:   02/07/2017 22:50:42  T:   02/08/2017 00:09:44  Job ID:   [id number]  Document ID:   [id number]  cc:

## 2017-02-08 NOTE — PLAN OF CARE
Problem: Patient Care Overview (Adult)  Goal: Plan of Care Review  Outcome: Ongoing (interventions implemented as appropriate)    02/08/17 1030   Coping/Psychosocial Response Interventions   Plan Of Care Reviewed With patient;daughter   Patient Care Overview   Progress improving   Outcome Evaluation   Outcome Summary/Follow up Plan Pt is reporting a HA pain level 6 this am. MRA done; awaiting results. /93 this am, HR 79. Tx'd HA with Tylenol, and pt is up walking with daughter. Will continue to monitor.         Problem: Fall Risk (Adult)  Goal: Absence of Falls  Outcome: Ongoing (interventions implemented as appropriate)    02/08/17 1030   Fall Risk (Adult)   Absence of Falls making progress toward outcome

## 2017-02-08 NOTE — PROGRESS NOTES
Discharge Planning Assessment  [facility]     Patient Name: Sheila Burleson  MRN: [id number]  Today's Date: 2/8/2017    Admit Date: 2/6/2017          Discharge Needs Assessment       02/08/17 1343    Living Environment    Lives With alone    Living Arrangements apartment    Quality Of Family Relationships supportive    Able to Return to Prior Living Arrangements yes    Discharge Needs Assessment    Concerns To Be Addressed no discharge needs identified    Readmission Within The Last 30 Days no previous admission in last 30 days    Equipment Currently Used at Home none    Equipment Needed After Discharge none    Transportation Available car;family or friend will provide            Discharge Plan       02/08/17 1344    Case Management/Social Work Plan    Plan Home no needs    Additional Comments Spoke with patient at bedside, face sheet verified patient lives alone and does not use any medical equipment, she is able to care for herself and has 4 children that help her. Patient has used home health in the past , she is unable to remember which agency she used.  Patient  plans to return home denies any discharge needs.  New RN         Discharge Placement     No information found                Demographic Summary       02/08/17 1335    Referral Information    Admission Type inpatient    Contact Information    Permission Granted to Share Information With other (see comments)   daughter Xi [phone]    Primary Care Physician Information    Name Silvano Barnhart     Phone [phone]            Functional Status       02/08/17 1339    Functional Status Current    Ambulation 1-->assistive equipment    Transferring 1-->assistive equipment    Toileting 1-->assistive equipment    Bathing 1-->assistive equipment    Dressing 1-->assistive equipment    Eating 1-->assistive equipment    Communication 0-->understands/communicates without difficulty    Functional Status Prior    Ambulation 0-->independent     Transferring 0-->independent    Toileting 0-->independent    Bathing 0-->independent    Dressing 0-->independent    Eating 0-->independent    Communication 0-->understands/communicates without difficulty    IADL    Medications independent    Meal Preparation independent    Housekeeping independent    Laundry independent    Shopping independent    Oral Care independent                    Laya Rivera, RN

## 2017-02-08 NOTE — SIGNIFICANT NOTE
02/08/17 1117   Rehab Treatment   Discipline occupational therapist   Rehab Evaluation   Evaluation Not Performed patient/family declined evaluation  (Pt and daughter denies need. BUE's WFL AROM and sensation. States has been wallking in hallway. Feels baseline funtion except for c/o min. blurred vsion left eye. Will not follow for OT. Nsg aware)

## 2017-02-08 NOTE — PLAN OF CARE
Problem: Patient Care Overview (Adult)  Goal: Plan of Care Review  Outcome: Ongoing (interventions implemented as appropriate)    02/08/17 0324   Coping/Psychosocial Response Interventions   Plan Of Care Reviewed With patient   Patient Care Overview   Progress improving   Outcome Evaluation   Outcome Summary/Follow up Plan Vital signs stable with high BP noted; no complaints of pain; left eye vision back to baseline per patient report; NIH 3; daughter at bedside; possible d/c today; will continue to monitor.          Problem: Fall Risk (Adult)  Goal: Absence of Falls  Outcome: Ongoing (interventions implemented as appropriate)    Problem: Stroke (Ischemic) (Adult)  Goal: Signs and Symptoms of Listed Potential Problems Will be Absent or Manageable (Stroke)  Outcome: Outcome(s) achieved Date Met:  02/08/17

## 2017-02-09 NOTE — PROGRESS NOTES
LOS: 1 day   Patient Care Team:  Silvano Barnhart MD as PCP - General (Internal Medicine)    Chief Complaint   Patient presents with   • Dizziness   • Eye Problem         Subjective   Had a little blurry vision this am but none since. Has felt good all day.        Objective     Vital Signs  Temp:  [97.5 °F (36.4 °C)-98.5 °F (36.9 °C)] 98.1 °F (36.7 °C)  Heart Rate:  [68-79] 75  Resp:  [12-18] 12  BP: (160-179)/() 160/83    Physical Exam:  WDWN WF in NAD  skin warm & dry  OS pupil larger than OD but not as much so as yesterday, both are reactive  Lungs clear  Heart RRR  Ext no edema       Results Review:     I reviewed the patient's new clinical results.    Medication Review:       LABS    Results from last 7 days  Lab Units 02/08/17  0351 02/07/17  0047   SODIUM mmol/L 143 145   POTASSIUM mmol/L 3.8 4.1   CHLORIDE mmol/L 103 106   TOTAL CO2 mmol/L 25.2 27.3   BUN mg/dL 15 15   CREATININE mg/dL 0.82 0.78   GLUCOSE mg/dL 107* 127*   CALCIUM mg/dL 9.6 10.2       Results from last 7 days  Lab Units 02/07/17  1138 02/07/17  0047   WBC 10*3/mm3 7.16 6.84   HEMOGLOBIN g/dL 14.4 14.0   HEMATOCRIT % 45.1 43.3   PLATELETS 10*3/mm3 306 292               Assessment/Plan     Active Problems:    Diplopia    Acute CVA (cerebrovascular accident)    Her daughter reports that pt had a similar episode about 5 yrs ago. Had the diplopia but didn't have any of the disconjugate gaze or change in the pupils  HTN - has appt with Dr Barnhart in 2 days    Job ID [id number]          Cecily Marx MD  02/08/17  7:42 PM      Time:

## 2017-02-10 ENCOUNTER — OFFICE VISIT (OUTPATIENT)
Dept: FAMILY MEDICINE CLINIC | Facility: CLINIC | Age: 77
End: 2017-02-10

## 2017-02-10 VITALS
SYSTOLIC BLOOD PRESSURE: 135 MMHG | HEIGHT: 62 IN | HEART RATE: 72 BPM | BODY MASS INDEX: 24.84 KG/M2 | OXYGEN SATURATION: 97 % | RESPIRATION RATE: 16 BRPM | DIASTOLIC BLOOD PRESSURE: 76 MMHG | TEMPERATURE: 98 F | WEIGHT: 135 LBS

## 2017-02-10 DIAGNOSIS — E78.00 HYPERCHOLESTEROLEMIA: ICD-10-CM

## 2017-02-10 DIAGNOSIS — I10 ESSENTIAL HYPERTENSION: Primary | ICD-10-CM

## 2017-02-10 PROCEDURE — 99213 OFFICE O/P EST LOW 20 MIN: CPT | Performed by: INTERNAL MEDICINE

## 2017-02-10 RX ORDER — LISINOPRIL AND HYDROCHLOROTHIAZIDE 12.5; 1 MG/1; MG/1
1 TABLET ORAL DAILY
Qty: 30 TABLET | Refills: 6 | Status: SHIPPED | OUTPATIENT
Start: 2017-02-10 | End: 2017-03-10 | Stop reason: SDUPTHER

## 2017-02-10 NOTE — PROGRESS NOTES
Subjective   Sheila Burleson is a 76 y.o. female. Patient is here today for   Chief Complaint   Patient presents with   • Cough     hospital f/u           Vitals:    02/10/17 0843   BP: 135/76   Pulse: 72   Resp: 16   Temp: 98 °F (36.7 °C)   SpO2: 97%       Past Medical History   Diagnosis Date   • Allergy desensitization therapy      gets shot weekly   • Hyperlipidemia    • Hypothyroidism    • Irritable bowel syndrome    • Multilevel degenerative disc disease    • Osteoarthritis    • Sialodocholithiasis    • Sleep apnea       Allergies   Allergen Reactions   • Amoxicillin-Pot Clavulanate    • Clarithromycin Nausea Only   • Levofloxacin    • Pollen Extract       Social History     Social History   • Marital status: Single     Spouse name: N/A   • Number of children: N/A   • Years of education: N/A     Occupational History   • Not on file.     Social History Main Topics   • Smoking status: Never Smoker   • Smokeless tobacco: Not on file   • Alcohol use Yes      Comment: social   • Drug use: No   • Sexual activity: Defer     Other Topics Concern   • Not on file     Social History Narrative        Current Outpatient Prescriptions:   •  atorvastatin (LIPITOR) 80 MG tablet, Take 1 tablet by mouth Every Night., Disp: 30 tablet, Rfl: 0  •  clopidogrel (PLAVIX) 75 MG tablet, Take 1 tablet by mouth Daily., Disp: 30 tablet, Rfl: 0  •  Ergocalciferol (VITAMIN D2) 2000 UNITS tablet, Take by mouth daily., Disp: , Rfl:   •  fluticasone (FLONASE) 50 MCG/ACT nasal spray, into each nostril daily., Disp: , Rfl:   •  levothyroxine (SYNTHROID, LEVOTHROID) 75 MCG tablet, Take 1 tablet by mouth daily., Disp: 90 tablet, Rfl: 3  •  sertraline (ZOLOFT) 100 MG tablet, Take 1 tablet by mouth daily., Disp: 30 tablet, Rfl: 3  •  zolpidem (AMBIEN) 5 MG tablet, Take 1 tablet by mouth At Night As Needed for sleep. (Patient taking differently: Take 5 mg by mouth Every Night.), Disp: 30 tablet, Rfl: 3  •  lisinopril-hydrochlorothiazide (ZESTORETIC)  10-12.5 MG per tablet, Take 1 tablet by mouth Daily., Disp: 30 tablet, Rfl: 6     Objective     HPI Comments: She absolutely denied having a cough or having had one.    She is here today to follow-up on a statin infarct that she had last week.    She had a complete workup in the hospital regarding the potential causes of this infarct.  Her 2-D echocardiogram was normal her carotid Doppler study was normal.    She tells me that she feels well she's not having vertigo problems.    Cough          Review of Systems   Constitutional: Negative.    HENT: Negative.    Respiratory: Negative for cough.    Cardiovascular: Negative.    Genitourinary: Negative.    Musculoskeletal: Negative.    Hematological: Negative.    Psychiatric/Behavioral: Negative.        Physical Exam   HENT:   Head: Normocephalic and atraumatic.   Pulmonary/Chest: Effort normal.   Neurological: She is alert.   Psychiatric: She has a normal mood and affect. Her behavior is normal. Judgment and thought content normal.   Nursing note and vitals reviewed.        Problem List Items Addressed This Visit        Cardiovascular and Mediastinum    Hypertension - Primary    Relevant Medications    lisinopril-hydrochlorothiazide (ZESTORETIC) 10-12.5 MG per tablet    Hypercholesterolemia            PLAN  Her blood pressure needs better control.  I wrote her prescription for lisinopril/hydrochlorothiazide 10/12.5 one daily.    Following her cerebrovascular accident, she was placed on atorvastatin 80 mg daily (appropriately).    I've asked her to follow-up in one month to recheck a lipid profile and comprehensive metabolic panel and reassess her blood pressure.  Return in about 4 weeks (around 3/10/2017) for with labs.

## 2017-02-11 ENCOUNTER — HOSPITAL ENCOUNTER (EMERGENCY)
Facility: HOSPITAL | Age: 77
Discharge: HOME | End: 2017-02-11
Attending: EMERGENCY MEDICINE | Admitting: EMERGENCY MEDICINE

## 2017-02-11 ENCOUNTER — APPOINTMENT (OUTPATIENT)
Dept: MRI IMAGING | Facility: HOSPITAL | Age: 77
End: 2017-02-11

## 2017-02-11 ENCOUNTER — APPOINTMENT (OUTPATIENT)
Dept: GENERAL RADIOLOGY | Facility: HOSPITAL | Age: 77
End: 2017-02-11

## 2017-02-11 ENCOUNTER — APPOINTMENT (OUTPATIENT)
Dept: CT IMAGING | Facility: HOSPITAL | Age: 77
End: 2017-02-11

## 2017-02-11 VITALS
BODY MASS INDEX: 24.84 KG/M2 | DIASTOLIC BLOOD PRESSURE: 97 MMHG | HEART RATE: 64 BPM | WEIGHT: 135 LBS | HEIGHT: 62 IN | TEMPERATURE: 97.8 F | SYSTOLIC BLOOD PRESSURE: 168 MMHG | OXYGEN SATURATION: 96 % | RESPIRATION RATE: 16 BRPM

## 2017-02-11 DIAGNOSIS — I63.9 ACUTE CVA (CEREBROVASCULAR ACCIDENT) (HCC): ICD-10-CM

## 2017-02-11 DIAGNOSIS — R42 DIZZINESS: ICD-10-CM

## 2017-02-11 DIAGNOSIS — H53.2 DIPLOPIA: Primary | ICD-10-CM

## 2017-02-11 LAB
ABO GROUP BLD: NORMAL
ALBUMIN SERPL-MCNC: 4.2 G/DL (ref 3.5–5.2)
ALBUMIN/GLOB SERPL: 1.4 G/DL
ALP SERPL-CCNC: 91 U/L (ref 39–117)
ALT SERPL W P-5'-P-CCNC: 18 U/L (ref 1–33)
ANION GAP SERPL CALCULATED.3IONS-SCNC: 12.3 MMOL/L
AST SERPL-CCNC: 24 U/L (ref 1–32)
BASOPHILS # BLD AUTO: 0.05 10*3/MM3 (ref 0–0.2)
BASOPHILS NFR BLD AUTO: 1 % (ref 0–1.5)
BILIRUB SERPL-MCNC: 0.4 MG/DL (ref 0.1–1.2)
BLD GP AB SCN SERPL QL: NEGATIVE
BUN BLD-MCNC: 15 MG/DL (ref 8–23)
BUN/CREAT SERPL: 19.2 (ref 7–25)
CALCIUM SPEC-SCNC: 9.9 MG/DL (ref 8.6–10.5)
CHLORIDE SERPL-SCNC: 103 MMOL/L (ref 98–107)
CO2 SERPL-SCNC: 25.7 MMOL/L (ref 22–29)
CREAT BLD-MCNC: 0.78 MG/DL (ref 0.57–1)
DEPRECATED RDW RBC AUTO: 42.4 FL (ref 37–54)
EOSINOPHIL # BLD AUTO: 0.47 10*3/MM3 (ref 0–0.7)
EOSINOPHIL NFR BLD AUTO: 9.7 % (ref 0.3–6.2)
ERYTHROCYTE [DISTWIDTH] IN BLOOD BY AUTOMATED COUNT: 13.7 % (ref 11.7–13)
GFR SERPL CREATININE-BSD FRML MDRD: 71 ML/MIN/1.73
GLOBULIN UR ELPH-MCNC: 2.9 GM/DL
GLUCOSE BLD-MCNC: 93 MG/DL (ref 65–99)
HCT VFR BLD AUTO: 42.4 % (ref 35.6–45.5)
HGB BLD-MCNC: 14 G/DL (ref 11.9–15.5)
HOLD SPECIMEN: NORMAL
HOLD SPECIMEN: NORMAL
IMM GRANULOCYTES # BLD: 0 10*3/MM3 (ref 0–0.03)
IMM GRANULOCYTES NFR BLD: 0 % (ref 0–0.5)
INR PPP: 0.99 (ref 0.9–1.1)
LYMPHOCYTES # BLD AUTO: 1.15 10*3/MM3 (ref 0.9–4.8)
LYMPHOCYTES NFR BLD AUTO: 23.7 % (ref 19.6–45.3)
MCH RBC QN AUTO: 27.9 PG (ref 26.9–32)
MCHC RBC AUTO-ENTMCNC: 33 G/DL (ref 32.4–36.3)
MCV RBC AUTO: 84.6 FL (ref 80.5–98.2)
MONOCYTES # BLD AUTO: 0.48 10*3/MM3 (ref 0.2–1.2)
MONOCYTES NFR BLD AUTO: 9.9 % (ref 5–12)
NEUTROPHILS # BLD AUTO: 2.71 10*3/MM3 (ref 1.9–8.1)
NEUTROPHILS NFR BLD AUTO: 55.7 % (ref 42.7–76)
PLATELET # BLD AUTO: 282 10*3/MM3 (ref 140–500)
PMV BLD AUTO: 11 FL (ref 6–12)
POTASSIUM BLD-SCNC: 3.9 MMOL/L (ref 3.5–5.2)
PROT SERPL-MCNC: 7.1 G/DL (ref 6–8.5)
PROTHROMBIN TIME: 12.7 SECONDS (ref 11.7–14.2)
RBC # BLD AUTO: 5.01 10*6/MM3 (ref 3.9–5.2)
RH BLD: NEGATIVE
SODIUM BLD-SCNC: 141 MMOL/L (ref 136–145)
WBC NRBC COR # BLD: 4.86 10*3/MM3 (ref 4.5–10.7)
WHOLE BLOOD HOLD SPECIMEN: NORMAL
WHOLE BLOOD HOLD SPECIMEN: NORMAL

## 2017-02-11 PROCEDURE — 86850 RBC ANTIBODY SCREEN: CPT

## 2017-02-11 PROCEDURE — 71010 HC CHEST PA OR AP: CPT

## 2017-02-11 PROCEDURE — 0 GADOBENATE DIMEGLUMINE 529 MG/ML SOLUTION: Performed by: EMERGENCY MEDICINE

## 2017-02-11 PROCEDURE — 86901 BLOOD TYPING SEROLOGIC RH(D): CPT

## 2017-02-11 PROCEDURE — 85025 COMPLETE CBC W/AUTO DIFF WBC: CPT | Performed by: EMERGENCY MEDICINE

## 2017-02-11 PROCEDURE — 99285 EMERGENCY DEPT VISIT HI MDM: CPT

## 2017-02-11 PROCEDURE — 93005 ELECTROCARDIOGRAM TRACING: CPT | Performed by: EMERGENCY MEDICINE

## 2017-02-11 PROCEDURE — 70450 CT HEAD/BRAIN W/O DYE: CPT

## 2017-02-11 PROCEDURE — 80053 COMPREHEN METABOLIC PANEL: CPT | Performed by: EMERGENCY MEDICINE

## 2017-02-11 PROCEDURE — 70553 MRI BRAIN STEM W/O & W/DYE: CPT

## 2017-02-11 PROCEDURE — A9577 INJ MULTIHANCE: HCPCS | Performed by: EMERGENCY MEDICINE

## 2017-02-11 PROCEDURE — 85610 PROTHROMBIN TIME: CPT | Performed by: EMERGENCY MEDICINE

## 2017-02-11 PROCEDURE — 93010 ELECTROCARDIOGRAM REPORT: CPT | Performed by: INTERNAL MEDICINE

## 2017-02-11 PROCEDURE — 86900 BLOOD TYPING SEROLOGIC ABO: CPT

## 2017-02-11 RX ORDER — CLOPIDOGREL BISULFATE 75 MG/1
75 TABLET ORAL ONCE
Status: COMPLETED | OUTPATIENT
Start: 2017-02-11 | End: 2017-02-11

## 2017-02-11 RX ORDER — SODIUM CHLORIDE 0.9 % (FLUSH) 0.9 %
10 SYRINGE (ML) INJECTION AS NEEDED
Status: DISCONTINUED | OUTPATIENT
Start: 2017-02-11 | End: 2017-02-11 | Stop reason: HOSPADM

## 2017-02-11 RX ADMIN — GADOBENATE DIMEGLUMINE 12 ML: 529 INJECTION, SOLUTION INTRAVENOUS at 14:19

## 2017-02-11 RX ADMIN — CLOPIDOGREL BISULFATE 75 MG: 75 TABLET, FILM COATED ORAL at 11:30

## 2017-02-11 NOTE — ED PROVIDER NOTES
EMERGENCY DEPARTMENT ENCOUNTER    CHIEF COMPLAINT  Chief Complaint: blurred vision  History given by: patient   History limited by: nothing  Room Number: 14/14  PMD: Silvano Barnhart MD      HPI:  Pt is a 76 y.o. female who presents complaining of blurred vision that began this morning when she woke up about 90 minutes ago. Pt states that she was prescribed Plavix in the hospital.  Pt denies numbness and tingling. Pt was recently admitted to the hospital for similar symptoms.    Duration:  Several hours   Onset: sudden  Timing: constant  Location: eyes  Radiation: none  Quality: burred  Intensity/Severity: moderate  Progression: unchanged  Associated Symptoms: none stated  Aggravating Factors: none stated   Alleviating Factors: none stated  Previous Episodes: Pt was recently admitted to the hospital for similar symptoms  Treatment before arrival: none stated    PAST MEDICAL HISTORY  Active Ambulatory Problems     Diagnosis Date Noted   • Atopic rhinitis 01/28/2016   • Arthropathy of knee 01/28/2016   • Depression 01/28/2016   • Gastroesophageal reflux disease 01/28/2016   • Hypothyroidism 01/28/2016   • Insomnia 01/28/2016   • Sialodocholithiasis 01/28/2016   • Irritable bowel syndrome 03/16/2016   • Hypersomnia 05/02/2016   • Vitamin D deficiency 08/30/2016   • Arthritis 10/27/2016   • Lower abdominal pain 12/16/2016   • Bilateral sciatica 12/16/2016   • Lumbar spine pain 02/02/2017   • Fatigue 02/02/2017   • Diplopia 02/07/2017   • Acute CVA (cerebrovascular accident) 02/07/2017   • Hypertension 02/08/2017   • Hypercholesterolemia 02/10/2017     Resolved Ambulatory Problems     Diagnosis Date Noted   • No Resolved Ambulatory Problems     Past Medical History   Diagnosis Date   • Allergy desensitization therapy    • Hyperlipidemia    • Multilevel degenerative disc disease    • Osteoarthritis    • Sleep apnea        PAST SURGICAL HISTORY  Past Surgical History   Procedure Laterality Date   • Appendectomy     •  Cholecystectomy     • Colon surgery     • Hysterectomy     • Cataract extraction w/ intraocular lens  implant, bilateral         FAMILY HISTORY  Family History   Problem Relation Age of Onset   • Stroke Mother    • Heart disease Father        SOCIAL HISTORY  Social History     Social History   • Marital status: Single     Spouse name: N/A   • Number of children: N/A   • Years of education: N/A     Occupational History   • Not on file.     Social History Main Topics   • Smoking status: Never Smoker   • Smokeless tobacco: Not on file   • Alcohol use Yes      Comment: social   • Drug use: No   • Sexual activity: Defer     Other Topics Concern   • Not on file     Social History Narrative       ALLERGIES  Amoxicillin-pot clavulanate; Clarithromycin; Levofloxacin; and Pollen extract    REVIEW OF SYSTEMS  Review of Systems   Constitutional: Negative for chills, fever and unexpected weight change.   HENT: Negative for congestion, rhinorrhea and sore throat.    Eyes: Positive for visual disturbance (blurred vision).   Respiratory: Negative for cough and shortness of breath.    Cardiovascular: Negative for chest pain and leg swelling.   Gastrointestinal: Negative for abdominal pain, blood in stool, diarrhea, nausea and vomiting.   Genitourinary: Negative for difficulty urinating, dysuria and frequency.   Musculoskeletal: Negative.    Skin: Negative.  Negative for rash.   Neurological: Negative.  Negative for weakness, numbness and headaches.   All other systems reviewed and are negative.      PHYSICAL EXAM  ED Triage Vitals   Temp Heart Rate Resp BP SpO2   02/11/17 1022 02/11/17 1022 02/11/17 1022 02/11/17 1022 02/11/17 1022   97.9 °F (36.6 °C) 73 16 164/89 94 %      Temp src Heart Rate Source Patient Position BP Location FiO2 (%)   02/11/17 1022 02/11/17 1022 -- -- --   Tympanic Monitor          Physical Exam   Constitutional: She is oriented to person, place, and time and well-developed, well-nourished, and in no distress.  No distress.   HENT:   Head: Normocephalic and atraumatic.   Eyes: EOM are normal. Pupils are equal, round, and reactive to light.   Neck: Normal range of motion. Neck supple.   Cardiovascular: Normal rate, regular rhythm and normal heart sounds.    Pulmonary/Chest: Effort normal and breath sounds normal. No respiratory distress.   Abdominal: Soft. There is no tenderness. There is no rebound and no guarding.   Musculoskeletal: Normal range of motion. She exhibits no edema.   Neurological: She is alert and oriented to person, place, and time. She has normal sensation and normal strength.   Skin: Skin is warm and dry. No rash noted.   Psychiatric: Mood and affect normal.   Nursing note and vitals reviewed.      LAB RESULTS  Lab Results (last 24 hours)     Procedure Component Value Units Date/Time    CBC & Differential [[id number]] Collected:  02/11/17 1038    Specimen:  Blood Updated:  02/11/17 1055    Narrative:       The following orders were created for panel order CBC & Differential.  Procedure                               Abnormality         Status                     ---------                               -----------         ------                     CBC Auto Differential[[id number]]         Abnormal            Final result                 Please view results for these tests on the individual orders.    Comprehensive Metabolic Panel [[id number]] Collected:  02/11/17 1038    Specimen:  Blood from Arm, Right Updated:  02/11/17 1121     Glucose 93 mg/dL      BUN 15 mg/dL      Creatinine 0.78 mg/dL      Sodium 141 mmol/L      Potassium 3.9 mmol/L      Chloride 103 mmol/L      CO2 25.7 mmol/L      Calcium 9.9 mg/dL      Total Protein 7.1 g/dL      Albumin 4.20 g/dL      ALT (SGPT) 18 U/L      AST (SGOT) 24 U/L      Alkaline Phosphatase 91 U/L      Total Bilirubin 0.4 mg/dL      eGFR Non African Amer 71 mL/min/1.73      Globulin 2.9 gm/dL      A/G Ratio 1.4 g/dL      BUN/Creatinine Ratio 19.2      Anion Gap 12.3 mmol/L      Narrative:       The MDRD GFR formula is only valid for adults with stable renal function between ages 18 and 70.    Protime-INR [[id number]]  (Normal) Collected:  02/11/17 1038    Specimen:  Blood Updated:  02/11/17 1104     Protime 12.7 Seconds      INR 0.99     CBC Auto Differential [[id number]]  (Abnormal) Collected:  02/11/17 1038    Specimen:  Blood Updated:  02/11/17 1055     WBC 4.86 10*3/mm3      RBC 5.01 10*6/mm3      Hemoglobin 14.0 g/dL      Hematocrit 42.4 %      MCV 84.6 fL      MCH 27.9 pg      MCHC 33.0 g/dL      RDW 13.7 (H) %      RDW-SD 42.4 fl      MPV 11.0 fL      Platelets 282 10*3/mm3      Neutrophil % 55.7 %      Lymphocyte % 23.7 %      Monocyte % 9.9 %      Eosinophil % 9.7 (H) %      Basophil % 1.0 %      Immature Grans % 0.0 %      Neutrophils, Absolute 2.71 10*3/mm3      Lymphocytes, Absolute 1.15 10*3/mm3      Monocytes, Absolute 0.48 10*3/mm3      Eosinophils, Absolute 0.47 10*3/mm3      Basophils, Absolute 0.05 10*3/mm3      Immature Grans, Absolute 0.00 10*3/mm3           I ordered the above labs and reviewed the results    RADIOLOGY  CT Head Without Contrast Stroke Protocol   Final Result   1. No evidence of acute intracranial process.       This report was finalized on 2/11/2017 11:39 AM by Dr. Wayne Torres MD.          XR Chest 1 View   Final Result   Stable negative acute chest.    MRI brain is negative and shows no changes.    I ordered the above noted radiological studies. Interpreted by radiologist. Reviewed by me in PACS.       PROCEDURES  Procedures  EKG           EKG time: 1035  Rhythm/Rate: NSR 70  P waves and KS: normal  QRS, axis: normal   ST and T waves: normal     Interpreted Contemporaneously by me, independently viewed  No prior for comparison      PROGRESS AND CONSULTS  ED Course     1030- Ordered EKG, blood work, Pt-INR, CMP, POC glucose fingerstick, CXR, CT head for further evaluation.    1155- Call to A.    1206- Pt rechecked and is resting comfortably. Pt  states that she is still experiencing blurred vision. Discussed with pt and family unremarkable lab results and CT head and CXR that shows nothing acute. Discussed with pt and family plan to discuss case with medicine.     1233- Discussed pt's case with Dr. Marx who states that she will discuss case with Dr. Arizmendi (neurology) and call back.    1241- Discussed pt's case with Dr. Marx who spoke to Dr. Arizmendi who would like MRI brain with and without contrast to be ordered for the pt. If no changes are shown, pt can be discharged with no medication changes.     1243- Pt rechecked and is resting comfortably. Discussed with pt and family plan to order MRI of the brain for further evaluation. Pt and family understand and agree to the plan. All questions addressed.    1523- Pt rechecked and is resting comfortably.    1526- Pt rechecked and is resting comfortably. Discussed with pt and family MRI brain results that are negative and shows no changes. Discussed with pt and family plan to discharge the pt home. Pt and family understand and agree to the plan. All questions addressed.      MEDICAL DECISION MAKING  Results were reviewed/discussed with the patient and they were also made aware of online access. Pt also made aware that some labs, such as cultures, will not be resulted during ER visit and follow up with PMD is necessary.     MDM  Number of Diagnoses or Management Options     Amount and/or Complexity of Data Reviewed  Clinical lab tests: ordered and reviewed  Tests in the radiology section of CPT®: ordered and reviewed (CXR shows stable negative acute chest.    CT head shows no evidence os intracranial process.    MRI brain is negative and shows no changes.)  Tests in the medicine section of CPT®: ordered and reviewed (See EKG procedure note)  Decide to obtain previous medical records or to obtain history from someone other than the patient: yes  Review and summarize past medical records: yes (Pt was hospitalized on  2/6/17-2/9/17 for )  Independent visualization of images, tracings, or specimens: yes    Patient Progress  Patient progress: stable         DIAGNOSIS  Final diagnoses:   Diplopia   Acute CVA (cerebrovascular accident)- Recent   Dizziness       DISPOSITION  DISCHARGE    Patient discharged in stable condition.    Reviewed implications of results, diagnosis, meds, responsibility to follow up, warning signs and symptoms of possible worsening, potential complications and reasons to return to ER.    Patient/Family voiced understanding of above instructions.    Discussed plan for discharge, as there is no emergent indication for admission.  Pt/family is agreeable and understands need for follow up and repeat testing.  Pt is aware that discharge does not mean that nothing is wrong but it indicates no emergency is present that requires admission and they must continue care with follow-up as given below or physician of their choice.     FOLLOW-UP  Silvano Barnhart MD  [id number] [id number] [phone]    Schedule an appointment as soon as possible for a visit           Medication List      Changed          zolpidem 5 MG tablet   Commonly known as:  AMBIEN   Take 1 tablet by mouth At Night As Needed for sleep.   What changed:  when to take this         Stop          fluticasone 50 MCG/ACT nasal spray   Commonly known as:  FLONASE               Latest Documented Vital Signs:  As of 3:35 PM  BP- 169/89 HR- 68 Temp- 97.9 °F (36.6 °C) (Tympanic) O2 sat- 98%    --  Documentation assistance provided by brandi Andrade for Dr. Gay.  Information recorded by the scribe was done at my direction and has been verified and validated by me.       Claudette Andrade  02/11/17 1126       Claudette Andrade  02/11/17 1257       Claudette Andrade  02/11/17 1529       David Gay MD  02/11/17 1531

## 2017-02-11 NOTE — DISCHARGE SUMMARY
DATE OF ADMISSION: 02/06/2017  DATE OF DISCHARGE: 02/08/2017    DISCHARGE DIAGNOSES:  1. Diplopia, felt to be secondary to cerebral ischemia.   2. Hypertension.    CONSULTANT: Carlos Eduardo Arizmendi MD, Neurology     HISTORY/HOSPITAL COURSE: The patient is a 76-year-old white female who presented with diplopia. She had initially been seen in the emergency room and diagnosed with vertigo and discharged with meclizine. She returned after the symptoms worsened. She said that is not really a spinning feeling. It is an off-balance feeling because of this double vision, but the double vision had been there from the beginning. On her exam it was noted that her left pupil was significantly larger than the right pupil, although it was reactive to light. Dr. Arizmendi saw her and felt that there was a brainstem infarct. Her MRI and MRA of the head and neck all came back negative with no evidence of acute infarction. The diplopia did resolve, although she continued with some blurry vision. On the morning of discharge, she had a little bit of some blurred vision that morning but had none after that. She was very anxious to be able to go home and felt considerably better. I saw her around 7 p.m. I thought that it probably would be okay to let her go since she had not had any of the blurred vision since early in the morning. She did have a problem with the blood pressures running high, which would certainly be consistent with some type of cerebral ischemia. That did start to come down but was still elevated. She had been in the 200s over 110s. They came down to 179/93, 160/83, and then 166/101. She has an appointment in 2 days with her primary care, so I told her to keep that appointment so that the blood pressure could be checked at that point. She was already on aspirin, so that was changed to Plavix. Dr. Arizmendi okayed her for discharge on 02/08/2017.     DISCHARGE MEDICATIONS:  1. Plavix 75 mg p.o. daily.   2. Atorvastatin 80 mg  p.o. daily.   3. Ambien.  4. Zoloft.   5. Synthroid.   6. Flonase.  7. Vitamin D.     DISCONTINUED MEDICATIONS:   1. Meloxicam.   2. Aspirin 81 mg.     DIET: Heart healthy.     ACTIVITY: As tolerated.     MAVERICK Marx M.D.  EDL:joe  D:   02/11/2017 12:50:03  T:   02/11/2017 14:27:09  Job ID:   [id number]  Document ID:   [id number]  cc:

## 2017-02-11 NOTE — ED PROVIDER NOTES
Subjective   History of Present Illness    Review of Systems    Past Medical History   Diagnosis Date   • Allergy desensitization therapy      gets shot weekly   • Hyperlipidemia    • Hypothyroidism    • Irritable bowel syndrome    • Multilevel degenerative disc disease    • Osteoarthritis    • Sialodocholithiasis    • Sleep apnea        Allergies   Allergen Reactions   • Amoxicillin-Pot Clavulanate    • Clarithromycin Nausea Only   • Levofloxacin    • Pollen Extract        Past Surgical History   Procedure Laterality Date   • Appendectomy     • Cholecystectomy     • Colon surgery     • Hysterectomy     • Cataract extraction w/ intraocular lens  implant, bilateral         Family History   Problem Relation Age of Onset   • Stroke Mother    • Heart disease Father        Social History     Social History   • Marital status: Single     Spouse name: N/A   • Number of children: N/A   • Years of education: N/A     Social History Main Topics   • Smoking status: Never Smoker   • Smokeless tobacco: None   • Alcohol use Yes      Comment: social   • Drug use: No   • Sexual activity: Defer     Other Topics Concern   • None     Social History Narrative         Objective   Physical Exam    Procedures         ED Course  ED Course                     MDM    Final diagnoses:   Diplopia   Acute CVA (cerebrovascular accident)- Recent   Dizziness       Documentation assistance provided by brandi Andrade.  Information recorded by the brandi was done at my direction and has been verified and validated by me.     Claudette Andrade  02/11/17 1533

## 2017-02-13 ENCOUNTER — TELEPHONE (OUTPATIENT)
Dept: SOCIAL WORK | Facility: HOSPITAL | Age: 77
End: 2017-02-13

## 2017-02-28 DIAGNOSIS — E78.5 HYPERLIPIDEMIA, UNSPECIFIED HYPERLIPIDEMIA TYPE: ICD-10-CM

## 2017-02-28 DIAGNOSIS — I10 ESSENTIAL HYPERTENSION: Primary | ICD-10-CM

## 2017-03-01 RX ORDER — SERTRALINE HYDROCHLORIDE 100 MG/1
TABLET, FILM COATED ORAL
Qty: 30 TABLET | Refills: 2 | OUTPATIENT
Start: 2017-03-01

## 2017-03-01 RX ORDER — SERTRALINE HYDROCHLORIDE 100 MG/1
100 TABLET, FILM COATED ORAL DAILY
Qty: 30 TABLET | Refills: 3 | Status: SHIPPED | OUTPATIENT
Start: 2017-03-01 | End: 2017-03-07 | Stop reason: SDUPTHER

## 2017-03-03 LAB
ALBUMIN SERPL-MCNC: 4.7 G/DL (ref 3.5–5.2)
ALBUMIN/GLOB SERPL: 2.1 G/DL
ALP SERPL-CCNC: 96 U/L (ref 39–117)
ALT SERPL-CCNC: 20 U/L (ref 1–33)
AST SERPL-CCNC: 22 U/L (ref 1–32)
BILIRUB SERPL-MCNC: 0.4 MG/DL (ref 0.1–1.2)
BUN SERPL-MCNC: 19 MG/DL (ref 8–23)
BUN/CREAT SERPL: 20.7 (ref 7–25)
CALCIUM SERPL-MCNC: 10.3 MG/DL (ref 8.6–10.5)
CHLORIDE SERPL-SCNC: 103 MMOL/L (ref 98–107)
CHOLEST SERPL-MCNC: 130 MG/DL (ref 0–200)
CO2 SERPL-SCNC: 29.2 MMOL/L (ref 22–29)
CREAT SERPL-MCNC: 0.92 MG/DL (ref 0.57–1)
GLOBULIN SER CALC-MCNC: 2.2 GM/DL
GLUCOSE SERPL-MCNC: 94 MG/DL (ref 65–99)
HDLC SERPL-MCNC: 45 MG/DL (ref 40–60)
LDLC SERPL CALC-MCNC: 62 MG/DL (ref 0–100)
LDLC/HDLC SERPL: 1.37 {RATIO}
POTASSIUM SERPL-SCNC: 4.6 MMOL/L (ref 3.5–5.2)
PROT SERPL-MCNC: 6.9 G/DL (ref 6–8.5)
SODIUM SERPL-SCNC: 144 MMOL/L (ref 136–145)
TRIGL SERPL-MCNC: 116 MG/DL (ref 0–150)
VLDLC SERPL CALC-MCNC: 23.2 MG/DL (ref 5–40)

## 2017-03-07 RX ORDER — SERTRALINE HYDROCHLORIDE 100 MG/1
100 TABLET, FILM COATED ORAL DAILY
Qty: 30 TABLET | Refills: 3 | Status: SHIPPED | OUTPATIENT
Start: 2017-03-07 | End: 2017-12-05 | Stop reason: SDUPTHER

## 2017-03-10 ENCOUNTER — TELEPHONE (OUTPATIENT)
Dept: FAMILY MEDICINE CLINIC | Facility: CLINIC | Age: 77
End: 2017-03-10

## 2017-03-10 ENCOUNTER — OFFICE VISIT (OUTPATIENT)
Dept: FAMILY MEDICINE CLINIC | Facility: CLINIC | Age: 77
End: 2017-03-10

## 2017-03-10 VITALS
HEART RATE: 75 BPM | RESPIRATION RATE: 16 BRPM | TEMPERATURE: 98.2 F | OXYGEN SATURATION: 95 % | DIASTOLIC BLOOD PRESSURE: 72 MMHG | HEIGHT: 62 IN | SYSTOLIC BLOOD PRESSURE: 120 MMHG | WEIGHT: 132 LBS | BODY MASS INDEX: 24.29 KG/M2

## 2017-03-10 DIAGNOSIS — I10 ESSENTIAL HYPERTENSION: Primary | ICD-10-CM

## 2017-03-10 DIAGNOSIS — E78.00 HYPERCHOLESTEROLEMIA: ICD-10-CM

## 2017-03-10 PROCEDURE — 99213 OFFICE O/P EST LOW 20 MIN: CPT | Performed by: INTERNAL MEDICINE

## 2017-03-10 RX ORDER — CLOPIDOGREL BISULFATE 75 MG/1
75 TABLET ORAL DAILY
Qty: 30 TABLET | Refills: 6 | Status: SHIPPED | OUTPATIENT
Start: 2017-03-10 | End: 2017-10-06 | Stop reason: SDUPTHER

## 2017-03-10 RX ORDER — EZETIMIBE 10 MG/1
10 TABLET ORAL DAILY
Qty: 30 TABLET | Refills: 11 | Status: SHIPPED | OUTPATIENT
Start: 2017-03-10 | End: 2018-01-17 | Stop reason: SDUPTHER

## 2017-03-10 RX ORDER — LISINOPRIL AND HYDROCHLOROTHIAZIDE 12.5; 1 MG/1; MG/1
1 TABLET ORAL DAILY
Qty: 30 TABLET | Refills: 6 | Status: SHIPPED | OUTPATIENT
Start: 2017-03-10 | End: 2017-05-01

## 2017-03-10 RX ORDER — ATORVASTATIN CALCIUM 80 MG/1
80 TABLET, FILM COATED ORAL NIGHTLY
Qty: 30 TABLET | Refills: 6 | Status: SHIPPED | OUTPATIENT
Start: 2017-03-10 | End: 2017-10-06 | Stop reason: SDUPTHER

## 2017-03-10 NOTE — PROGRESS NOTES
Subjective   Sheila Burleson is a 76 y.o. female. Patient is here today for   Chief Complaint   Patient presents with   • Hypertension     lab f/u   • Hyperlipidemia          Vitals:    03/10/17 0922   BP: 120/72   Pulse: 75   Resp: 16   Temp: 98.2 °F (36.8 °C)   SpO2: 95%       Past Medical History   Diagnosis Date   • Allergy desensitization therapy      gets shot weekly   • Hyperlipidemia    • Hypothyroidism    • Irritable bowel syndrome    • Multilevel degenerative disc disease    • Osteoarthritis    • Sialodocholithiasis    • Sleep apnea       Allergies   Allergen Reactions   • Amoxicillin-Pot Clavulanate    • Clarithromycin Nausea Only   • Levofloxacin    • Pollen Extract       Social History     Social History   • Marital status: Single     Spouse name: N/A   • Number of children: N/A   • Years of education: N/A     Occupational History   • Not on file.     Social History Main Topics   • Smoking status: Never Smoker   • Smokeless tobacco: Not on file   • Alcohol use Yes      Comment: social   • Drug use: No   • Sexual activity: Defer     Other Topics Concern   • Not on file     Social History Narrative        Current Outpatient Prescriptions:   •  Ergocalciferol (VITAMIN D2) 2000 UNITS tablet, Take by mouth daily., Disp: , Rfl:   •  fluticasone (FLONASE) 50 MCG/ACT nasal spray, into each nostril daily., Disp: , Rfl:   •  levothyroxine (SYNTHROID, LEVOTHROID) 75 MCG tablet, Take 1 tablet by mouth daily., Disp: 90 tablet, Rfl: 3  •  sertraline (ZOLOFT) 100 MG tablet, Take 1 tablet by mouth Daily., Disp: 30 tablet, Rfl: 3  •  zolpidem (AMBIEN) 5 MG tablet, Take 1 tablet by mouth At Night As Needed for sleep. (Patient taking differently: Take 5 mg by mouth Every Night.), Disp: 30 tablet, Rfl: 3  •  atorvastatin (LIPITOR) 80 MG tablet, Take 1 tablet by mouth Every Night., Disp: 30 tablet, Rfl: 6  •  clopidogrel (PLAVIX) 75 MG tablet, Take 1 tablet by mouth Daily., Disp: 30 tablet, Rfl: 6  •  ezetimibe (ZETIA) 10 MG  tablet, Take 1 tablet by mouth Daily., Disp: 30 tablet, Rfl: 11  •  lisinopril-hydrochlorothiazide (ZESTORETIC) 10-12.5 MG per tablet, Take 1 tablet by mouth Daily., Disp: 30 tablet, Rfl: 6     Objective     HPI Comments: She is here to follow-up on hypertension.    She tells me that she feels well.    Hypertension     Hyperlipidemia          Review of Systems   Constitutional: Negative.    HENT: Negative.    Respiratory: Negative.    Cardiovascular: Negative.    Genitourinary: Negative.    Musculoskeletal: Negative.    Neurological: Negative.    Hematological: Negative.    Psychiatric/Behavioral: Negative.        Physical Exam   Constitutional: She is oriented to person, place, and time. She appears well-developed and well-nourished.   HENT:   Head: Normocephalic and atraumatic.   Pulmonary/Chest: Effort normal.   Neurological: She is alert and oriented to person, place, and time.   Skin: Skin is warm and dry.   Psychiatric: She has a normal mood and affect. Her behavior is normal.   Nursing note and vitals reviewed.        Problem List Items Addressed This Visit        Cardiovascular and Mediastinum    Hypertension - Primary    Hypercholesterolemia    Relevant Medications    ezetimibe (ZETIA) 10 MG tablet            PLAN  Her hypertension is well-controlled.    Her hypercholesterolemia is poorly controlled though considerably better than when it was checked last.  Her target LDL cholesterol is to get it to be less than 70.  I asked her to start that he had 10 mg once daily.    I like to have her back in 2 months with labs done about a week before that visit including: Lipid profile and comprehensive metabolic panel.  Return in about 2 months (around 5/10/2017) for with labs.

## 2017-03-10 NOTE — TELEPHONE ENCOUNTER
Prescriptions sent to pharmacy   ----- Message from Lyla Mariscal sent at 3/10/2017 12:46 PM EST -----  NEEDS A RX CALLED INTO [facility]       ATORVASTIN 80MG # 30     LISINOPRIL 5 MG #30   CLOPIDOGREL  75 MG  # 30     PLEASE CALL PT WITH ANY QUESTIONS     [phone]

## 2017-03-22 ENCOUNTER — OFFICE VISIT (OUTPATIENT)
Dept: FAMILY MEDICINE CLINIC | Facility: CLINIC | Age: 77
End: 2017-03-22

## 2017-03-22 VITALS
SYSTOLIC BLOOD PRESSURE: 82 MMHG | BODY MASS INDEX: 24.29 KG/M2 | HEART RATE: 74 BPM | RESPIRATION RATE: 16 BRPM | HEIGHT: 62 IN | TEMPERATURE: 98 F | OXYGEN SATURATION: 98 % | DIASTOLIC BLOOD PRESSURE: 54 MMHG | WEIGHT: 132 LBS

## 2017-03-22 DIAGNOSIS — R05.9 COUGH: Primary | ICD-10-CM

## 2017-03-22 DIAGNOSIS — R03.1 LOW BLOOD PRESSURE READING: ICD-10-CM

## 2017-03-22 PROCEDURE — 99213 OFFICE O/P EST LOW 20 MIN: CPT | Performed by: NURSE PRACTITIONER

## 2017-03-22 NOTE — PROGRESS NOTES
Subjective   Sheila Burleson is a 76 y.o. female.   Chief Complaint   Patient presents with   • Cough     pt states been going on for about 2-3 days    • Dizziness   • Fatigue   • Other     sinus pressure    • Generalized Body Aches     Vitals:    03/22/17 1436   BP: (!) 82/54   Pulse: 74   Resp: 16   Temp: 98 °F (36.7 °C)   SpO2: 98%     BP recheck 90/52  No LMP recorded.    History of Present Illness  Sheila is here for an acute visit. She c/o dizziness for the past few days. She also has had a dry hacking cough since starting lisinopril. She denies fever or chills. She has been fatigued the past few days as well. She checked her BP at home and it SBP was 80 yesterday and has run low since she started lisinopril. She took her BP med this am. She denies weakness, headache, visual disturbance, nasal drainage.     The following portions of the patient's history were reviewed and updated as appropriate: allergies, current medications, past family history, past medical history, past social history, past surgical history and problem list.    Review of Systems   Constitutional: Positive for fatigue. Negative for chills and fever.   HENT: Negative for ear pain, sinus pressure and sore throat.    Respiratory: Positive for cough (dry hacking ). Negative for shortness of breath and wheezing.    Cardiovascular: Negative for chest pain, palpitations and leg swelling.   Gastrointestinal: Negative for nausea.   Neurological: Positive for dizziness and light-headedness. Negative for weakness, numbness and headaches.       Objective   Physical Exam   Constitutional: Vital signs are normal. She appears well-developed and well-nourished. No distress.   HENT:   Right Ear: Tympanic membrane, external ear and ear canal normal.   Left Ear: Tympanic membrane, external ear and ear canal normal.   Nose: Nose normal.   Mouth/Throat: Uvula is midline and oropharynx is clear and moist.   Cardiovascular: Normal rate and regular rhythm.     Pulmonary/Chest: Effort normal and breath sounds normal.   Neurological: She is alert.   Skin: Skin is warm and dry.       Assessment/Plan   Sheila was seen today for cough, dizziness, fatigue, other and generalized body aches.    Diagnoses and all orders for this visit:    Cough    Low blood pressure reading      The dry hacking cough is likely a side effect of the lisinopril.   Her blood pressure is very low today so I will hold her BP med for now  I recommend that she take her BP twice daily and record. She should call if >130/80. She will return next week for a BP check and to discuss taking a different BP medication with Dr Barnhart  Advised to go to the ER for syncope or if she develops any neurologic symptoms

## 2017-03-29 ENCOUNTER — OFFICE VISIT (OUTPATIENT)
Dept: FAMILY MEDICINE CLINIC | Facility: CLINIC | Age: 77
End: 2017-03-29

## 2017-03-29 VITALS
RESPIRATION RATE: 16 BRPM | HEART RATE: 74 BPM | SYSTOLIC BLOOD PRESSURE: 130 MMHG | HEIGHT: 62 IN | WEIGHT: 131 LBS | BODY MASS INDEX: 24.11 KG/M2 | DIASTOLIC BLOOD PRESSURE: 72 MMHG | TEMPERATURE: 98.2 F | OXYGEN SATURATION: 98 %

## 2017-03-29 DIAGNOSIS — I10 ESSENTIAL HYPERTENSION: ICD-10-CM

## 2017-03-29 DIAGNOSIS — E78.00 HYPERCHOLESTEROLEMIA: Primary | ICD-10-CM

## 2017-03-29 PROCEDURE — 99213 OFFICE O/P EST LOW 20 MIN: CPT | Performed by: INTERNAL MEDICINE

## 2017-03-30 RX ORDER — ZOLPIDEM TARTRATE 5 MG/1
TABLET ORAL
Qty: 30 TABLET | Refills: 1 | OUTPATIENT
Start: 2017-03-30

## 2017-03-30 RX ORDER — ZOLPIDEM TARTRATE 5 MG/1
TABLET ORAL
Qty: 30 TABLET | Refills: 1 | Status: SHIPPED | OUTPATIENT
Start: 2017-03-30 | End: 2017-05-02 | Stop reason: SDUPTHER

## 2017-03-31 ENCOUNTER — TELEPHONE (OUTPATIENT)
Dept: FAMILY MEDICINE CLINIC | Facility: CLINIC | Age: 77
End: 2017-03-31

## 2017-03-31 ENCOUNTER — HOSPITAL ENCOUNTER (OUTPATIENT)
Dept: SLEEP MEDICINE | Facility: HOSPITAL | Age: 77
Discharge: HOME | End: 2017-03-31
Admitting: INTERNAL MEDICINE

## 2017-03-31 DIAGNOSIS — G47.10 HYPERSOMNIA: Primary | ICD-10-CM

## 2017-03-31 PROCEDURE — G0463 HOSPITAL OUTPT CLINIC VISIT: HCPCS

## 2017-03-31 NOTE — TELEPHONE ENCOUNTER
Called pharmacy and gave the OK to fill prescription early.  Patient notified.  ----- Message from Sofiya Conway sent at 3/31/2017 11:52 AM EDT -----  Contact: [phone]  NEED US TO CALL [facility]  [phone] TO TELL THEM IT IS OK TO HAVE HER ZOLPIDEM FILEDL EARLY SINCE SHE IS GOING OUT OF TOWN - THEY ALREADY HAVE THE RX JUST WAITING ON US TO OK    LET HER KNOW WHEN THIS HAS BEEN DONE

## 2017-04-04 NOTE — PROGRESS NOTES
Date of Visit: 03/31/2017    PRIMARY CARE PHYSICIAN/REFERRING PHYSICIAN: Silvano Barnhart MD     CHIEF COMPLAINT: Hypersomnia.     REASON FOR VISIT: Suspected sleep-disordered breathing.     HISTORY OF PRESENT ILLNESS: The patient is a very pleasant 68-year-old female who is here today to see us for evaluation of suspected sleep-disordered breathing. She goes to bed at 11 p.m. It takes her up to 3 hours to fall asleep if she does not take zolpidem. If she takes the zolpidem she falls asleep fairly quickly. She is taking usually 5 mg at bedtime. She is up at 9 a.m. She usually feels tired upon awakening. On the weekend she adheres to the same sleep schedule. She was never previously tested for sleep-disordered breathing. During the night she reports sudden nocturnal arousal from sleep. She reports loud snoring that occurs in all sleep positions. She has severe fatigue and hypersomnia during the day. Her weight has increased in the past 5 years. She has nocturnal arousals with coughing, choking, and respiratory discomfort. She has morning headaches and wakes up in the morning with a sore throat and dry mouth. She reports restlessness of the legs at night and jerking. She has been taking zolpidem 5 mg for the past year. She had a history of mini stroke 1-1/2 months ago.      PAST MEDICAL HISTORY:   1. Stroke 1-1/2 months ago.   2. Hypertension.   3. Difficulty concentrating.   4. Thyroid disease.   5. Arthritis.     MEDICATIONS:  1. Zetia.   2. Levothyroxine.   3. Zolpidem.   4. Atorvastatin.   5. Sertraline.   6. Clopidogrel.     FAMILY HISTORY: Hypertension, thyroid disease.     SOCIAL HISTORY: No tobacco. Drinks 2 caffeinated beverages a day.     REVIEW OF SYSTEMS: Nasal congestion.     [place] Sleepiness Scale score today is 6.     PHYSICAL EXAMINATION:  VITAL SIGNS: Height 5 feet 2 inches, weight 130 pounds, BMI 24, blood pressure 133/74, heart rate 68, neck size 13 inches.   HEENT: Class III Mallampati airway.  No evidence of exudate.   NECK: Trachea midline.   LUNGS: Respiratory effort nonlabored.   HEART: Regular rate and rhythm. No murmurs or rubs.   ABDOMEN: Soft, nontender. Bowel sounds are present.   EXTREMITIES: No evidence of edema. Pedal pulses positive.     ASSESSMENT:  1. Witnessed apneas.   2. Questionable obstructive sleep apnea.   3. History of a stroke 1-1/2 months ago.   4. Depression.   5. Insomnia sleep onset, on zolpidem.     PLAN: The patient does have typical symptoms of obstructive sleep apnea. She has witnessed apneas and gasping for breath type episodes. She has class III Mallampati airway and snoring. I would like to schedule her for an in-lab polysomnography. She did have a history of stroke 1-1/2 months ago. We will evaluate for other sleep disorders also. She has sleep-onset insomnia currently controlled on zolpidem. I asked her to bring her own Ambien and take here at the lab at lights out when she is connected to the monitoring. She does have depression. She is currently on Zoloft. Her symptoms are controlled.     I asked her to follow up with Dr. Adrián Samuel after completion of polysomnography.     I appreciate the opportunity of participating in this patient's care.     KAREN Soto    [place]:ab  D:   04/04/2017 09:43:20  T:   04/04/2017 19:41:35  Job ID:   [id number]  Document ID:   [id number]  cc:   ANNA Barnhart M.D.

## 2017-04-04 NOTE — PROGRESS NOTES
Subjective   Sheila Burleson is a 76 y.o. female. Patient is here today for   Chief Complaint   Patient presents with   • Hypertension     f/u           Vitals:    03/29/17 0949   BP: 130/72   Pulse: 74   Resp: 16   Temp: 98.2 °F (36.8 °C)   SpO2: 98%       Past Medical History:   Diagnosis Date   • Allergy desensitization therapy     gets shot weekly   • Hyperlipidemia    • Hypothyroidism    • Irritable bowel syndrome    • Multilevel degenerative disc disease    • Osteoarthritis    • Sialodocholithiasis    • Sleep apnea       Allergies   Allergen Reactions   • Amoxicillin-Pot Clavulanate    • Clarithromycin Nausea Only   • Levofloxacin    • Pollen Extract       Social History     Social History   • Marital status: Single     Spouse name: N/A   • Number of children: N/A   • Years of education: N/A     Occupational History   • Not on file.     Social History Main Topics   • Smoking status: Never Smoker   • Smokeless tobacco: Not on file   • Alcohol use Yes      Comment: social   • Drug use: No   • Sexual activity: Defer     Other Topics Concern   • Not on file     Social History Narrative        Current Outpatient Prescriptions:   •  atorvastatin (LIPITOR) 80 MG tablet, Take 1 tablet by mouth Every Night., Disp: 30 tablet, Rfl: 6  •  clopidogrel (PLAVIX) 75 MG tablet, Take 1 tablet by mouth Daily., Disp: 30 tablet, Rfl: 6  •  Ergocalciferol (VITAMIN D2) 2000 UNITS tablet, Take by mouth daily., Disp: , Rfl:   •  ezetimibe (ZETIA) 10 MG tablet, Take 1 tablet by mouth Daily., Disp: 30 tablet, Rfl: 11  •  levothyroxine (SYNTHROID, LEVOTHROID) 75 MCG tablet, Take 1 tablet by mouth daily., Disp: 90 tablet, Rfl: 3  •  lisinopril-hydrochlorothiazide (ZESTORETIC) 10-12.5 MG per tablet, Take 1 tablet by mouth Daily., Disp: 30 tablet, Rfl: 6  •  sertraline (ZOLOFT) 100 MG tablet, Take 1 tablet by mouth Daily., Disp: 30 tablet, Rfl: 3  •  zolpidem (AMBIEN) 5 MG tablet, TAKE ONE TABLET BY MOUTH EVERY NIGHT AT BEDTIME AS NEEDED  SLEEP, Disp: 30 tablet, Rfl: 1     Objective     HPI Comments: She is here to follow-up on hypertension, and hypercholesterolemia.    Hypertension          Review of Systems   Constitutional: Negative.    HENT: Negative.    Respiratory: Negative.    Cardiovascular: Negative.    Psychiatric/Behavioral: Negative.        Physical Exam   Constitutional: She is oriented to person, place, and time. She appears well-developed and well-nourished.   Pulmonary/Chest: Effort normal.   Neurological: She is alert and oriented to person, place, and time.   Psychiatric: She has a normal mood and affect. Her behavior is normal.   Nursing note and vitals reviewed.        Problem List Items Addressed This Visit        Cardiovascular and Mediastinum    Hypertension    Hypercholesterolemia - Primary            PLAN  Her hypercholesterolemia is well-controlled on atorvastatin 80 mg once daily.    Her hypertension is well-controlled.    She ought follow-up in about 6 months recheck lipid and comprehensive metabolic panel.  Also check a TSH and free T4 that time.  No Follow-up on file.

## 2017-04-21 ENCOUNTER — TELEPHONE (OUTPATIENT)
Dept: SURGERY | Facility: CLINIC | Age: 77
End: 2017-04-21

## 2017-04-21 NOTE — TELEPHONE ENCOUNTER
Patient had mild stroke about 6 weeks ago and was put on Plavix 75mg. She is scheduled 5/8/17. [place] should she stop med.    Reply  I recommend that she postpone the c scope and phone Dr Barnhart to see if he thinks it is safe to stop her plavix for 7 days for the procedure.   We can wait as long as one year if he feels it is best, to prevent a recurrent stroke.    Krysta Herzog MD

## 2017-04-21 NOTE — TELEPHONE ENCOUNTER
Spoke with patient. She has appt to see Dr Barnhart and her neurologist in the next 2 weeks. She will discuss with them and call us back to reschedule at a later date.

## 2017-05-01 ENCOUNTER — OFFICE VISIT (OUTPATIENT)
Dept: NEUROLOGY | Facility: CLINIC | Age: 77
End: 2017-05-01

## 2017-05-01 VITALS
OXYGEN SATURATION: 96 % | WEIGHT: 130 LBS | HEIGHT: 62 IN | SYSTOLIC BLOOD PRESSURE: 137 MMHG | DIASTOLIC BLOOD PRESSURE: 83 MMHG | BODY MASS INDEX: 23.92 KG/M2 | HEART RATE: 77 BPM

## 2017-05-01 DIAGNOSIS — I10 ESSENTIAL HYPERTENSION: ICD-10-CM

## 2017-05-01 DIAGNOSIS — R00.2 HEART PALPITATIONS: Primary | ICD-10-CM

## 2017-05-01 DIAGNOSIS — G45.9 TRANSIENT CEREBRAL ISCHEMIA, UNSPECIFIED TYPE: ICD-10-CM

## 2017-05-01 DIAGNOSIS — E78.00 HYPERCHOLESTEROLEMIA: ICD-10-CM

## 2017-05-01 PROCEDURE — 99213 OFFICE O/P EST LOW 20 MIN: CPT | Performed by: NURSE PRACTITIONER

## 2017-05-01 RX ORDER — MULTIVIT WITH MINERALS/LUTEIN
250 TABLET ORAL DAILY
COMMUNITY

## 2017-05-03 RX ORDER — ZOLPIDEM TARTRATE 5 MG/1
TABLET ORAL
Qty: 30 TABLET | Refills: 0 | Status: SHIPPED | OUTPATIENT
Start: 2017-05-03 | End: 2017-07-03 | Stop reason: SDUPTHER

## 2017-05-04 ENCOUNTER — HOSPITAL ENCOUNTER (OUTPATIENT)
Dept: SLEEP MEDICINE | Facility: HOSPITAL | Age: 77
Discharge: HOME | End: 2017-05-04
Admitting: INTERNAL MEDICINE

## 2017-05-04 DIAGNOSIS — G47.10 HYPERSOMNIA: ICD-10-CM

## 2017-05-04 PROCEDURE — 95811 POLYSOM 6/>YRS CPAP 4/> PARM: CPT

## 2017-05-09 DIAGNOSIS — E78.00 HYPERCHOLESTEROLEMIA: ICD-10-CM

## 2017-05-09 DIAGNOSIS — I10 ESSENTIAL HYPERTENSION: Primary | ICD-10-CM

## 2017-05-09 DIAGNOSIS — E03.9 HYPOTHYROIDISM, UNSPECIFIED TYPE: ICD-10-CM

## 2017-05-10 LAB
ALBUMIN SERPL-MCNC: 4.5 G/DL (ref 3.5–5.2)
ALBUMIN/GLOB SERPL: 1.8 G/DL
ALP SERPL-CCNC: 85 U/L (ref 39–117)
ALT SERPL-CCNC: 18 U/L (ref 1–33)
AST SERPL-CCNC: 19 U/L (ref 1–32)
BILIRUB SERPL-MCNC: 0.5 MG/DL (ref 0.1–1.2)
BUN SERPL-MCNC: 22 MG/DL (ref 8–23)
BUN/CREAT SERPL: 24.7 (ref 7–25)
CALCIUM SERPL-MCNC: 10.2 MG/DL (ref 8.6–10.5)
CHLORIDE SERPL-SCNC: 102 MMOL/L (ref 98–107)
CHOLEST SERPL-MCNC: 114 MG/DL (ref 0–200)
CO2 SERPL-SCNC: 28.1 MMOL/L (ref 22–29)
CREAT SERPL-MCNC: 0.89 MG/DL (ref 0.57–1)
GLOBULIN SER CALC-MCNC: 2.5 GM/DL
GLUCOSE SERPL-MCNC: 89 MG/DL (ref 65–99)
HDLC SERPL-MCNC: 45 MG/DL (ref 40–60)
LDLC SERPL CALC-MCNC: 43 MG/DL (ref 0–100)
LDLC/HDLC SERPL: 0.96 {RATIO}
POTASSIUM SERPL-SCNC: 4.7 MMOL/L (ref 3.5–5.2)
PROT SERPL-MCNC: 7 G/DL (ref 6–8.5)
SODIUM SERPL-SCNC: 144 MMOL/L (ref 136–145)
TRIGL SERPL-MCNC: 128 MG/DL (ref 0–150)
TSH SERPL DL<=0.005 MIU/L-ACNC: 0.5 MIU/ML (ref 0.27–4.2)
VLDLC SERPL CALC-MCNC: 25.6 MG/DL (ref 5–40)

## 2017-05-12 ENCOUNTER — TELEPHONE (OUTPATIENT)
Dept: SLEEP MEDICINE | Facility: HOSPITAL | Age: 77
End: 2017-05-12

## 2017-05-12 ENCOUNTER — OFFICE VISIT (OUTPATIENT)
Dept: FAMILY MEDICINE CLINIC | Facility: CLINIC | Age: 77
End: 2017-05-12

## 2017-05-12 ENCOUNTER — TELEPHONE (OUTPATIENT)
Dept: FAMILY MEDICINE CLINIC | Facility: CLINIC | Age: 77
End: 2017-05-12

## 2017-05-12 VITALS
WEIGHT: 131.4 LBS | HEART RATE: 70 BPM | OXYGEN SATURATION: 97 % | SYSTOLIC BLOOD PRESSURE: 124 MMHG | DIASTOLIC BLOOD PRESSURE: 60 MMHG | BODY MASS INDEX: 24.18 KG/M2 | RESPIRATION RATE: 16 BRPM | HEIGHT: 62 IN | TEMPERATURE: 97.7 F

## 2017-05-12 DIAGNOSIS — J06.9 ACUTE URI: ICD-10-CM

## 2017-05-12 DIAGNOSIS — R05.9 COUGH: Primary | ICD-10-CM

## 2017-05-12 DIAGNOSIS — H61.21 IMPACTED CERUMEN OF RIGHT EAR: ICD-10-CM

## 2017-05-12 PROCEDURE — 85025 COMPLETE CBC W/AUTO DIFF WBC: CPT | Performed by: NURSE PRACTITIONER

## 2017-05-12 PROCEDURE — 69210 REMOVE IMPACTED EAR WAX UNI: CPT | Performed by: NURSE PRACTITIONER

## 2017-05-12 PROCEDURE — 99213 OFFICE O/P EST LOW 20 MIN: CPT | Performed by: NURSE PRACTITIONER

## 2017-05-17 DIAGNOSIS — R00.2 HEART PALPITATIONS: Primary | ICD-10-CM

## 2017-05-19 ENCOUNTER — OFFICE VISIT (OUTPATIENT)
Dept: FAMILY MEDICINE CLINIC | Facility: CLINIC | Age: 77
End: 2017-05-19

## 2017-05-19 VITALS
OXYGEN SATURATION: 97 % | HEIGHT: 62 IN | WEIGHT: 131 LBS | TEMPERATURE: 98 F | DIASTOLIC BLOOD PRESSURE: 76 MMHG | SYSTOLIC BLOOD PRESSURE: 124 MMHG | HEART RATE: 73 BPM | RESPIRATION RATE: 16 BRPM | BODY MASS INDEX: 24.11 KG/M2

## 2017-05-19 DIAGNOSIS — E03.9 ACQUIRED HYPOTHYROIDISM: ICD-10-CM

## 2017-05-19 DIAGNOSIS — E78.00 HYPERCHOLESTEROLEMIA: Primary | ICD-10-CM

## 2017-05-19 DIAGNOSIS — I10 ESSENTIAL HYPERTENSION: ICD-10-CM

## 2017-05-19 PROCEDURE — 99213 OFFICE O/P EST LOW 20 MIN: CPT | Performed by: INTERNAL MEDICINE

## 2017-05-19 RX ORDER — AZITHROMYCIN 250 MG/1
TABLET, FILM COATED ORAL
Qty: 6 TABLET | Refills: 0 | Status: SHIPPED | OUTPATIENT
Start: 2017-05-19 | End: 2017-07-03

## 2017-05-19 RX ORDER — PRAMIPEXOLE DIHYDROCHLORIDE 0.25 MG/1
TABLET ORAL
COMMUNITY
Start: 2017-05-12 | End: 2017-11-08 | Stop reason: SINTOL

## 2017-05-22 ENCOUNTER — TELEPHONE (OUTPATIENT)
Dept: NEUROLOGY | Facility: CLINIC | Age: 77
End: 2017-05-22

## 2017-06-05 RX ORDER — LEVOTHYROXINE SODIUM 0.07 MG/1
TABLET ORAL
Qty: 90 TABLET | Refills: 2 | Status: SHIPPED | OUTPATIENT
Start: 2017-06-05 | End: 2018-03-09 | Stop reason: SDUPTHER

## 2017-07-03 ENCOUNTER — OFFICE VISIT (OUTPATIENT)
Dept: FAMILY MEDICINE CLINIC | Facility: CLINIC | Age: 77
End: 2017-07-03

## 2017-07-03 VITALS
HEIGHT: 62 IN | HEART RATE: 76 BPM | SYSTOLIC BLOOD PRESSURE: 160 MMHG | OXYGEN SATURATION: 97 % | BODY MASS INDEX: 24.29 KG/M2 | DIASTOLIC BLOOD PRESSURE: 84 MMHG | WEIGHT: 132 LBS | TEMPERATURE: 98.8 F | RESPIRATION RATE: 16 BRPM

## 2017-07-03 DIAGNOSIS — F51.01 PRIMARY INSOMNIA: ICD-10-CM

## 2017-07-03 DIAGNOSIS — J01.00 ACUTE MAXILLARY SINUSITIS, RECURRENCE NOT SPECIFIED: Primary | ICD-10-CM

## 2017-07-03 DIAGNOSIS — IMO0001 ELEVATED BLOOD PRESSURE: ICD-10-CM

## 2017-07-03 PROCEDURE — 99214 OFFICE O/P EST MOD 30 MIN: CPT | Performed by: NURSE PRACTITIONER

## 2017-07-03 RX ORDER — AZITHROMYCIN 250 MG/1
TABLET, FILM COATED ORAL
Qty: 6 TABLET | Refills: 0 | Status: SHIPPED | OUTPATIENT
Start: 2017-07-03 | End: 2017-08-31

## 2017-07-03 RX ORDER — ZOLPIDEM TARTRATE 5 MG/1
5 TABLET ORAL NIGHTLY PRN
Qty: 30 TABLET | Refills: 0 | Status: SHIPPED | OUTPATIENT
Start: 2017-07-03 | End: 2017-09-05 | Stop reason: SDUPTHER

## 2017-07-03 NOTE — PROGRESS NOTES
Subjective   Sheila Burleson is a 76 y.o. female. Patient is here today for   Chief Complaint   Patient presents with   • eye pressure     pt states has been going on for about a wk    • Cough     pt states coughing up green mucus, about 3 days ago    • Nasal Congestion   • Insomnia     refill on ambien           Vitals:    07/03/17 1339   BP: 160/84   Pulse: 76   Resp: 16   Temp: 98.8 °F (37.1 °C)   SpO2: 97%         Past Medical History:   Diagnosis Date   • Allergy desensitization therapy     gets shot weekly   • Daytime sleepiness    • Head trauma    • Hyperlipidemia    • Hypertension    • Hypothyroidism    • Irritable bowel syndrome    • Multilevel degenerative disc disease    • Osteoarthritis    • Sialodocholithiasis    • Sleep apnea    • Stroke    • Thyroid disease       Allergies   Allergen Reactions   • Amoxicillin-Pot Clavulanate    • Clarithromycin Nausea Only   • Levofloxacin    • Pollen Extract       Social History     Social History   • Marital status: Single     Spouse name: N/A   • Number of children: N/A   • Years of education: N/A     Occupational History   • Not on file.     Social History Main Topics   • Smoking status: Never Smoker   • Smokeless tobacco: Never Used   • Alcohol use Yes      Comment: social   • Drug use: No   • Sexual activity: Defer     Other Topics Concern   • Not on file     Social History Narrative        Current Outpatient Prescriptions:   •  atorvastatin (LIPITOR) 80 MG tablet, Take 1 tablet by mouth Every Night., Disp: 30 tablet, Rfl: 6  •  azithromycin (ZITHROMAX) 250 MG tablet, Take 2 tablets the first day, then 1 tablet daily for 4 days., Disp: 6 tablet, Rfl: 0  •  clopidogrel (PLAVIX) 75 MG tablet, Take 1 tablet by mouth Daily., Disp: 30 tablet, Rfl: 6  •  Ergocalciferol (VITAMIN D2) 2000 UNITS tablet, Take by mouth daily., Disp: , Rfl:   •  ezetimibe (ZETIA) 10 MG tablet, Take 1 tablet by mouth Daily., Disp: 30 tablet, Rfl: 11  •  levothyroxine (SYNTHROID, LEVOTHROID) 75  MCG tablet, TAKE ONE TABLET BY MOUTH DAILY, Disp: 90 tablet, Rfl: 2  •  pramipexole (MIRAPEX) 0.25 MG tablet, , Disp: , Rfl:   •  sertraline (ZOLOFT) 100 MG tablet, Take 1 tablet by mouth Daily., Disp: 30 tablet, Rfl: 3  •  vitamin C (ASCORBIC ACID) 250 MG tablet, Take 250 mg by mouth Daily., Disp: , Rfl:   •  zolpidem (AMBIEN) 5 MG tablet, Take 1 tablet by mouth At Night As Needed for Sleep., Disp: 30 tablet, Rfl: 0     Objective     History of Present Illness  Sheila is a patient of Dr Barnhart who is here for an acute visit and for medication refills  Patient complains of congestion, facial pain, post nasal drip, purulent rhinorrhea and sinus pressure. Onset of symptoms was 3 weeks ago. Symptoms have been gradually worsening since that time. She is drinking plenty of fluids.  Past history is significant for allergic rhinitis. Patient is non-smoker. She has been taking claritin daily.   She also has a history of insomnia and takes ambien as needed. It works well for her and she is not experiencing any side effects.  She was recently taken off her blood pressure medication due to low blood pressure and her blood pressure is elevated today. She is going out of town and is concerned.    The following portions of the patient's history were reviewed and updated as appropriate: allergies, current medications, past family history, past medical history, past social history, past surgical history and problem list.  Review of Systems   Constitutional: Positive for fatigue. Negative for chills and fever.   HENT: Positive for congestion, postnasal drip, rhinorrhea, sinus pressure, sneezing and sore throat. Negative for ear pain.    Eyes: Negative for visual disturbance.   Respiratory: Positive for cough. Negative for shortness of breath and wheezing.    Cardiovascular: Negative.    Gastrointestinal: Negative.    Genitourinary: Negative.    Musculoskeletal: Negative for arthralgias.   Neurological: Negative for dizziness, weakness  and headaches.   Psychiatric/Behavioral: Positive for sleep disturbance.       Physical Exam   Constitutional: Vital signs are normal. She appears well-developed and well-nourished. She appears ill. No distress.   HENT:   Head: Normocephalic.   Right Ear: Tympanic membrane, external ear and ear canal normal.   Left Ear: Tympanic membrane, external ear and ear canal normal.   Nose: Mucosal edema and rhinorrhea present. Right sinus exhibits maxillary sinus tenderness. Right sinus exhibits no frontal sinus tenderness. Left sinus exhibits maxillary sinus tenderness. Left sinus exhibits no frontal sinus tenderness.   Mouth/Throat: Uvula is midline. Posterior oropharyngeal erythema present.   Cardiovascular: Normal rate, regular rhythm and normal heart sounds.    BP recheck 138/82    Pulmonary/Chest: Effort normal and breath sounds normal.   Neurological: She is alert.   Skin: Skin is warm and dry. No rash noted. There is pallor.   Psychiatric: She has a normal mood and affect.       ASSESSMENT     Problem List Items Addressed This Visit     Insomnia      Other Visit Diagnoses     Acute maxillary sinusitis, recurrence not specified    -  Primary    Elevated blood pressure              PLAN    1. Acute sinusitis- zithromax and sinus rinses with saline, continue claritin  2. Elevated blood pressure, I rechecked it and it was 138/82. I recommend she continue to monitor it at home and follow up if it continues to be above 140/90  3. Insomnia- Dr Barnhart is out of the office so I gave her a refill on ambien  LOIDA was obtained and reviewed and is appropriate  Follow up if your symptoms persist, worsen, or if you develop new symptoms

## 2017-07-26 ENCOUNTER — TELEPHONE (OUTPATIENT)
Dept: NEUROLOGY | Facility: CLINIC | Age: 77
End: 2017-07-26

## 2017-07-26 NOTE — TELEPHONE ENCOUNTER
----- Message from KAREN Wheat sent at 7/26/2017 10:17 AM EDT -----  Let her know her heart monitor was negative for atrial fib

## 2017-07-26 NOTE — TELEPHONE ENCOUNTER
I S/W Ms. Burleson and let her know the cardiac monitor she wore for 30 days was negative for atrial fibrillation.  She understood what A Fib was and she can call me with any further questions.  RAVINDER Villafana RN

## 2017-08-16 DIAGNOSIS — E03.9 ACQUIRED HYPOTHYROIDISM: ICD-10-CM

## 2017-08-16 DIAGNOSIS — I10 ESSENTIAL HYPERTENSION: Primary | ICD-10-CM

## 2017-08-16 DIAGNOSIS — E78.00 HYPERCHOLESTEROLEMIA: ICD-10-CM

## 2017-08-25 LAB
ALBUMIN SERPL-MCNC: 4.6 G/DL (ref 3.5–5.2)
ALBUMIN/GLOB SERPL: 1.8 G/DL
ALP SERPL-CCNC: 82 U/L (ref 39–117)
ALT SERPL-CCNC: 24 U/L (ref 1–33)
AST SERPL-CCNC: 27 U/L (ref 1–32)
BASOPHILS # BLD AUTO: 0.05 10*3/MM3 (ref 0–0.2)
BASOPHILS NFR BLD AUTO: 1.2 % (ref 0–1.5)
BILIRUB SERPL-MCNC: 0.4 MG/DL (ref 0.1–1.2)
BUN SERPL-MCNC: 18 MG/DL (ref 8–23)
BUN/CREAT SERPL: 20.7 (ref 7–25)
CALCIUM SERPL-MCNC: 10.2 MG/DL (ref 8.6–10.5)
CHLORIDE SERPL-SCNC: 105 MMOL/L (ref 98–107)
CHOLEST SERPL-MCNC: 101 MG/DL (ref 0–200)
CO2 SERPL-SCNC: 28.3 MMOL/L (ref 22–29)
CREAT SERPL-MCNC: 0.87 MG/DL (ref 0.57–1)
EOSINOPHIL # BLD AUTO: 0.31 10*3/MM3 (ref 0–0.7)
EOSINOPHIL NFR BLD AUTO: 7.3 % (ref 0.3–6.2)
ERYTHROCYTE [DISTWIDTH] IN BLOOD BY AUTOMATED COUNT: 13.9 % (ref 11.7–13)
GLOBULIN SER CALC-MCNC: 2.5 GM/DL
GLUCOSE SERPL-MCNC: 97 MG/DL (ref 65–99)
HCT VFR BLD AUTO: 43.8 % (ref 35.6–45.5)
HDLC SERPL-MCNC: 53 MG/DL (ref 40–60)
HGB BLD-MCNC: 13.5 G/DL (ref 11.9–15.5)
IMM GRANULOCYTES # BLD: 0 10*3/MM3 (ref 0–0.03)
IMM GRANULOCYTES NFR BLD: 0 % (ref 0–0.5)
LDLC SERPL CALC-MCNC: 36 MG/DL (ref 0–100)
LDLC/HDLC SERPL: 0.68 {RATIO}
LYMPHOCYTES # BLD AUTO: 1.21 10*3/MM3 (ref 0.9–4.8)
LYMPHOCYTES NFR BLD AUTO: 28.5 % (ref 19.6–45.3)
MCH RBC QN AUTO: 28.5 PG (ref 26.9–32)
MCHC RBC AUTO-ENTMCNC: 30.8 G/DL (ref 32.4–36.3)
MCV RBC AUTO: 92.6 FL (ref 80.5–98.2)
MONOCYTES # BLD AUTO: 0.43 10*3/MM3 (ref 0.2–1.2)
MONOCYTES NFR BLD AUTO: 10.1 % (ref 5–12)
NEUTROPHILS # BLD AUTO: 2.25 10*3/MM3 (ref 1.9–8.1)
NEUTROPHILS NFR BLD AUTO: 52.9 % (ref 42.7–76)
PLATELET # BLD AUTO: 249 10*3/MM3 (ref 140–500)
POTASSIUM SERPL-SCNC: 4.6 MMOL/L (ref 3.5–5.2)
PROT SERPL-MCNC: 7.1 G/DL (ref 6–8.5)
RBC # BLD AUTO: 4.73 10*6/MM3 (ref 3.9–5.2)
SODIUM SERPL-SCNC: 145 MMOL/L (ref 136–145)
TRIGL SERPL-MCNC: 59 MG/DL (ref 0–150)
TSH SERPL DL<=0.005 MIU/L-ACNC: 0.69 MIU/ML (ref 0.27–4.2)
VLDLC SERPL CALC-MCNC: 11.8 MG/DL (ref 5–40)
WBC # BLD AUTO: 4.25 10*3/MM3 (ref 4.5–10.7)

## 2017-08-31 ENCOUNTER — OFFICE VISIT (OUTPATIENT)
Dept: FAMILY MEDICINE CLINIC | Facility: CLINIC | Age: 77
End: 2017-08-31

## 2017-08-31 VITALS
OXYGEN SATURATION: 97 % | WEIGHT: 132 LBS | SYSTOLIC BLOOD PRESSURE: 140 MMHG | HEIGHT: 62 IN | TEMPERATURE: 98.4 F | DIASTOLIC BLOOD PRESSURE: 76 MMHG | BODY MASS INDEX: 24.29 KG/M2 | HEART RATE: 69 BPM

## 2017-08-31 DIAGNOSIS — J01.00 ACUTE NON-RECURRENT MAXILLARY SINUSITIS: ICD-10-CM

## 2017-08-31 DIAGNOSIS — G44.52 NEW DAILY PERSISTENT HEADACHE: Primary | ICD-10-CM

## 2017-08-31 PROCEDURE — 85025 COMPLETE CBC W/AUTO DIFF WBC: CPT | Performed by: INTERNAL MEDICINE

## 2017-08-31 PROCEDURE — 99213 OFFICE O/P EST LOW 20 MIN: CPT | Performed by: INTERNAL MEDICINE

## 2017-08-31 RX ORDER — AZITHROMYCIN 250 MG/1
TABLET, FILM COATED ORAL
Qty: 6 TABLET | Refills: 0 | Status: SHIPPED | OUTPATIENT
Start: 2017-08-31 | End: 2017-09-11

## 2017-09-01 ENCOUNTER — APPOINTMENT (OUTPATIENT)
Dept: SLEEP MEDICINE | Facility: HOSPITAL | Age: 77
End: 2017-09-01

## 2017-09-01 LAB
CRP SERPL-MCNC: 0.03 MG/DL (ref 0–0.5)
ERYTHROCYTE [SEDIMENTATION RATE] IN BLOOD BY WESTERGREN METHOD: 1 MM/HR (ref 0–30)

## 2017-09-05 ENCOUNTER — TELEPHONE (OUTPATIENT)
Dept: FAMILY MEDICINE CLINIC | Facility: CLINIC | Age: 77
End: 2017-09-05

## 2017-09-05 RX ORDER — ZOLPIDEM TARTRATE 5 MG/1
5 TABLET ORAL NIGHTLY PRN
Qty: 30 TABLET | Refills: 0 | Status: SHIPPED | OUTPATIENT
Start: 2017-09-05 | End: 2018-03-29 | Stop reason: SDUPTHER

## 2017-09-05 NOTE — TELEPHONE ENCOUNTER
Informed patient that the prescription was ready for    ----- Message from Sofiya Conway sent at 9/5/2017 10:16 AM EDT -----  Contact: PT  [phone]  WRITTEN RX    ZOLPIDEM 5 MG  1 QHS    CALL WHEN READY

## 2017-09-11 ENCOUNTER — OFFICE VISIT (OUTPATIENT)
Dept: FAMILY MEDICINE CLINIC | Facility: CLINIC | Age: 77
End: 2017-09-11

## 2017-09-11 VITALS
HEART RATE: 68 BPM | OXYGEN SATURATION: 97 % | DIASTOLIC BLOOD PRESSURE: 68 MMHG | TEMPERATURE: 98 F | SYSTOLIC BLOOD PRESSURE: 120 MMHG | HEIGHT: 62 IN | WEIGHT: 131 LBS | RESPIRATION RATE: 16 BRPM | BODY MASS INDEX: 24.11 KG/M2

## 2017-09-11 DIAGNOSIS — E78.00 HYPERCHOLESTEROLEMIA: ICD-10-CM

## 2017-09-11 DIAGNOSIS — R10.30 LOWER ABDOMINAL PAIN: ICD-10-CM

## 2017-09-11 DIAGNOSIS — F32.5 MAJOR DEPRESSIVE DISORDER WITH SINGLE EPISODE, IN FULL REMISSION (HCC): ICD-10-CM

## 2017-09-11 DIAGNOSIS — E03.9 ACQUIRED HYPOTHYROIDISM: ICD-10-CM

## 2017-09-11 DIAGNOSIS — R10.9 ABDOMINAL PAIN, UNSPECIFIED LOCATION: Primary | ICD-10-CM

## 2017-09-11 DIAGNOSIS — I10 ESSENTIAL HYPERTENSION: ICD-10-CM

## 2017-09-11 PROCEDURE — 36415 COLL VENOUS BLD VENIPUNCTURE: CPT | Performed by: INTERNAL MEDICINE

## 2017-09-11 PROCEDURE — 85025 COMPLETE CBC W/AUTO DIFF WBC: CPT | Performed by: INTERNAL MEDICINE

## 2017-09-11 PROCEDURE — 99214 OFFICE O/P EST MOD 30 MIN: CPT | Performed by: INTERNAL MEDICINE

## 2017-09-11 RX ORDER — CIPROFLOXACIN 500 MG/1
500 TABLET, FILM COATED ORAL 2 TIMES DAILY
Qty: 20 TABLET | Refills: 0 | Status: SHIPPED | OUTPATIENT
Start: 2017-09-11 | End: 2017-09-27

## 2017-09-11 RX ORDER — DICYCLOMINE HCL 20 MG
20 TABLET ORAL EVERY 6 HOURS
Qty: 40 TABLET | Refills: 1 | Status: SHIPPED | OUTPATIENT
Start: 2017-09-11 | End: 2020-10-21

## 2017-09-12 NOTE — PROGRESS NOTES
Subjective   Sheila Burleson is a 76 y.o. female. Patient is here today for   Chief Complaint   Patient presents with   • Hypertension     lab f/u   • Hyperlipidemia   • Abdominal Pain     abdominal pain and gas           Vitals:    09/11/17 0838   BP: 120/68   Pulse: 68   Resp: 16   Temp: 98 °F (36.7 °C)   SpO2: 97%       Past Medical History:   Diagnosis Date   • Allergy desensitization therapy     gets shot weekly   • Daytime sleepiness    • Head trauma    • Hyperlipidemia    • Hypertension    • Hypothyroidism    • Irritable bowel syndrome    • Multilevel degenerative disc disease    • Osteoarthritis    • Sialodocholithiasis    • Sleep apnea    • Stroke    • Thyroid disease       Allergies   Allergen Reactions   • Amoxicillin-Pot Clavulanate    • Clarithromycin Nausea Only   • Levofloxacin    • Pollen Extract       Social History     Social History   • Marital status: Single     Spouse name: N/A   • Number of children: N/A   • Years of education: N/A     Occupational History   • Not on file.     Social History Main Topics   • Smoking status: Never Smoker   • Smokeless tobacco: Never Used   • Alcohol use Yes      Comment: social   • Drug use: No   • Sexual activity: Defer     Other Topics Concern   • Not on file     Social History Narrative        Current Outpatient Prescriptions:   •  atorvastatin (LIPITOR) 80 MG tablet, Take 1 tablet by mouth Every Night., Disp: 30 tablet, Rfl: 6  •  clopidogrel (PLAVIX) 75 MG tablet, Take 1 tablet by mouth Daily., Disp: 30 tablet, Rfl: 6  •  Ergocalciferol (VITAMIN D2) 2000 UNITS tablet, Take by mouth daily., Disp: , Rfl:   •  ezetimibe (ZETIA) 10 MG tablet, Take 1 tablet by mouth Daily., Disp: 30 tablet, Rfl: 11  •  levothyroxine (SYNTHROID, LEVOTHROID) 75 MCG tablet, TAKE ONE TABLET BY MOUTH DAILY, Disp: 90 tablet, Rfl: 2  •  pramipexole (MIRAPEX) 0.25 MG tablet, , Disp: , Rfl:   •  sertraline (ZOLOFT) 100 MG tablet, Take 1 tablet by mouth Daily., Disp: 30 tablet, Rfl: 3  •   vitamin C (ASCORBIC ACID) 250 MG tablet, Take 250 mg by mouth Daily., Disp: , Rfl:   •  zolpidem (AMBIEN) 5 MG tablet, Take 1 tablet by mouth At Night As Needed for Sleep., Disp: 30 tablet, Rfl: 0  •  ciprofloxacin (CIPRO) 500 MG tablet, Take 1 tablet by mouth 2 (Two) Times a Day., Disp: 20 tablet, Rfl: 0  •  dicyclomine (BENTYL) 20 MG tablet, Take 1 tablet by mouth Every 6 (Six) Hours., Disp: 40 tablet, Rfl: 1     Objective     HPI Comments: She is here to follow-up on hypertension, hypercholesterolemia, hypothyroidism, she complains of abdominal pain.  She has a history of diverticulosis.  Her pain is primarily the left lower quadrant.  It's been going on for couple weeks.  It is sharp and crampy in nature.  It tends to resolve pretty quickly after it begins.  She doesn't have any diarrhea or constipation.    Hypertension     Hyperlipidemia     Abdominal Pain          Review of Systems   Constitutional: Negative.    HENT: Negative.    Cardiovascular: Negative.    Gastrointestinal: Positive for abdominal pain.   Musculoskeletal: Negative.    Psychiatric/Behavioral: Negative.        Physical Exam   Constitutional: She is oriented to person, place, and time. She appears well-developed and well-nourished.   HENT:   Head: Normocephalic and atraumatic.   Cardiovascular: Normal rate and regular rhythm.    Pulmonary/Chest: Effort normal.   Abdominal: Soft. Bowel sounds are normal. She exhibits no distension and no mass. There is no rebound and no guarding.   She is a little bit of tenderness in the left lower quadrant on palpation.  There is no guarding.   Neurological: She is alert and oriented to person, place, and time.   Psychiatric: She has a normal mood and affect. Her behavior is normal.   Nursing note and vitals reviewed.        Problem List Items Addressed This Visit        Cardiovascular and Mediastinum    Hypertension    Hypercholesterolemia       Endocrine    Hypothyroidism       Nervous and Auditory    Lower  abdominal pain       Other    Depression      Other Visit Diagnoses     Abdominal pain, unspecified location    -  Primary    Relevant Orders    POC CBC With / Auto Diff (Completed)            PLAN  His CBC today was normal.  I'm going to treat her as if she has diverticulitis.  Ciprofloxacin 500 mg 1 by mouth twice a day #20 no refills.    She will let me know if her abdominal pain gets worse, or fails to resolve.  I did give her prescription for Bentyl 3 times a day when necessary as well for abdominal cramping.    She feels her depression is well controlled on sertraline.    Her hypothyroidism is appropriately replaced.    Her hypercholesterolemia is well-controlled.    Her hypertension is well-controlled.  No Follow-up on file.

## 2017-09-15 ENCOUNTER — HOSPITAL ENCOUNTER (OUTPATIENT)
Dept: SLEEP MEDICINE | Facility: HOSPITAL | Age: 77
Discharge: HOME | End: 2017-09-15
Admitting: INTERNAL MEDICINE

## 2017-09-15 PROCEDURE — G0463 HOSPITAL OUTPT CLINIC VISIT: HCPCS

## 2017-09-18 NOTE — PROGRESS NOTES
[facility]  Telephone: [phone] / Fax: [phone] [place]  Telephone: [phone] / Fax: [phone] Arlette Vasquez    PCP: Silvano Barnhart MD    Reason for visit: BALBINA f/u    Sheila Burleson was last seen at [facility] sleep lab in March 2017.  Sleep study was performed thereafter and she was set up with the CPAP device.  She uses the device and benefits from its use in terms of reduction of hypersomnia and snoring.  She has not seen [place] for mask fit.  Her current mask does not  fit well.  She goes to bed at 11:30 PM and awakens at 9 AM.  She uses nasal pillows.    Social history, no tobacco, no alcohol, no energy drinks, one caffeinated beverage a day.    Review of systems, positive for nasal congestion, cough, anxiety.  Insomnia is controlled on Ambien.    Current Medications:    Current Outpatient Prescriptions:   •  atorvastatin (LIPITOR) 80 MG tablet, Take 1 tablet by mouth Every Night., Disp: 30 tablet, Rfl: 6  •  ciprofloxacin (CIPRO) 500 MG tablet, Take 1 tablet by mouth 2 (Two) Times a Day., Disp: 20 tablet, Rfl: 0  •  clopidogrel (PLAVIX) 75 MG tablet, Take 1 tablet by mouth Daily., Disp: 30 tablet, Rfl: 6  •  dicyclomine (BENTYL) 20 MG tablet, Take 1 tablet by mouth Every 6 (Six) Hours., Disp: 40 tablet, Rfl: 1  •  Ergocalciferol (VITAMIN D2) 2000 UNITS tablet, Take by mouth daily., Disp: , Rfl:   •  ezetimibe (ZETIA) 10 MG tablet, Take 1 tablet by mouth Daily., Disp: 30 tablet, Rfl: 11  •  levothyroxine (SYNTHROID, LEVOTHROID) 75 MCG tablet, TAKE ONE TABLET BY MOUTH DAILY, Disp: 90 tablet, Rfl: 2  •  pramipexole (MIRAPEX) 0.25 MG tablet, , Disp: , Rfl:   •  sertraline (ZOLOFT) 100 MG tablet, Take 1 tablet by mouth Daily., Disp: 30 tablet, Rfl: 3  •  vitamin C (ASCORBIC ACID) 250 MG tablet, Take 250 mg by mouth Daily., Disp: , Rfl:   •  zolpidem (AMBIEN) 5 MG tablet, Take 1 tablet by mouth At Night As Needed for Sleep., Disp: 30 tablet, Rfl: 0   also entered in Sleep  Questionnaire    Patient  has a past medical history of Allergy desensitization therapy; Daytime sleepiness; Head trauma; Hyperlipidemia; Hypertension; Hypothyroidism; Irritable bowel syndrome; Multilevel degenerative disc disease; Osteoarthritis; Sialodocholithiasis; Sleep apnea; Stroke; and Thyroid disease.    I have reviewed the Past Medical History, Past Surgical History, Social History and Family History.             Vital Signs: Height 62 inches, weight 132 pounds, BMI 24, blood pressure 139/81, heart rate 68           General: Alert. Cooperative. Well developed. No acute distress.           Throat: No oral lesions. No thrush. Moist mucous membranes.           Pharynx- Class III Mallampati airway, large tongue, no evidence of redundant  lateral pharyngeal tissue             Head:  Normocephalic. Symmetrical. Atraumatic.              Nose: No septal deviation. No drainage.            Chest Wall -Normal shape. Symmetric expansion with respiration. No tenderness.             Neck:  Trachea midline.           Lungs:  Clear to auscultation bilaterally. No wheezes. No rhonchi. No rales. Respirations regular, even and unlabored.            Heart:  Regular rhythm and normal rate. Normal S1 and S2. No murmur.     Abdomen:  Soft, non-tender and non-distended. Normal bowel sounds. No masses.  Extremities:  Moves all extremities well. No edema.    Psychiatric: Normal mood and affect.    Testing:  · Download 6/17/17-9/14/17, 58% compliance with average use of 5 hours and 40 minutes on auto CPAP 10-20 centimeters with average pressure of 13.1 cm, residual AHI 8.8.  No significant air leak is noted.    ·  PSG 5/4/17 Impression: at least moderate obstructive   sleep apnea which is severe in supine position. Additionally periodic limb movement disorder appears to be present and persists on titration.      Impression:  Moderate obstructive sleep apnea  PLMD  Recent stroke  Insomnia  Hypertension  History of  depression-controlled on Zoloft    Plan:  Patient uses the CPAP device and benefits from its use in terms of reduction of hypersomnia and snoring. I asked her to see DME and have her mask refitted.  Her residual AHI is 8.8 and will be monitored.  For now no pressure adjustment will be made.  She understands the importance of using the CPAP device with underlying history of stroke.  Her insomnia is currently controlled on the Ambien.    Caution during activities that require prolonged concentration is strongly advised if sleepiness returns. Changing of PAP supplies regularly is important for effective use. Patient needs to change cushion on the mask or plugs on nasal pillows along with disposable filters once every month and change mask frame, tubing, headgear and Velcro straps every 6 months at the minimum.       Follow up in 3-4 months    Thank you for allowing me to participate in your patient's care.      KAREN Soto  Dictating for Dr. [facility]  Phone: [phone]      Part of this note may be an electronic transcription/translation of spoken language to printed text using the Dragon Dictation System.

## 2017-09-27 ENCOUNTER — OFFICE VISIT (OUTPATIENT)
Dept: FAMILY MEDICINE CLINIC | Facility: CLINIC | Age: 77
End: 2017-09-27

## 2017-09-27 VITALS
SYSTOLIC BLOOD PRESSURE: 120 MMHG | WEIGHT: 130 LBS | RESPIRATION RATE: 16 BRPM | DIASTOLIC BLOOD PRESSURE: 76 MMHG | BODY MASS INDEX: 23.92 KG/M2 | HEART RATE: 70 BPM | OXYGEN SATURATION: 98 % | TEMPERATURE: 98.4 F | HEIGHT: 62 IN

## 2017-09-27 DIAGNOSIS — R10.84 GENERALIZED ABDOMINAL PAIN: Primary | ICD-10-CM

## 2017-09-27 PROCEDURE — 99213 OFFICE O/P EST LOW 20 MIN: CPT | Performed by: INTERNAL MEDICINE

## 2017-09-27 RX ORDER — HYOSCYAMINE SULFATE 0.125 MG
0.12 TABLET ORAL EVERY 4 HOURS PRN
Qty: 40 TABLET | Refills: 4 | Status: SHIPPED | OUTPATIENT
Start: 2017-09-27 | End: 2018-03-27

## 2017-09-27 NOTE — PROGRESS NOTES
Subjective   Sheila Burleson is a 77 y.o. female. Patient is here today for   Chief Complaint   Patient presents with   • Abdominal Pain     still hving pain and bloating- also says that she has a cough    • Cough          Vitals:    09/27/17 1004   BP: 120/76   Pulse: 70   Resp: 16   Temp: 98.4 °F (36.9 °C)   SpO2: 98%       Past Medical History:   Diagnosis Date   • Allergy desensitization therapy     gets shot weekly   • Daytime sleepiness    • Head trauma    • Hyperlipidemia    • Hypertension    • Hypothyroidism    • Irritable bowel syndrome    • Multilevel degenerative disc disease    • Osteoarthritis    • Sialodocholithiasis    • Sleep apnea    • Stroke    • Thyroid disease       Allergies   Allergen Reactions   • Amoxicillin-Pot Clavulanate    • Clarithromycin Nausea Only   • Levofloxacin    • Pollen Extract       Social History     Social History   • Marital status: Single     Spouse name: N/A   • Number of children: N/A   • Years of education: N/A     Occupational History   • Not on file.     Social History Main Topics   • Smoking status: Never Smoker   • Smokeless tobacco: Never Used   • Alcohol use Yes      Comment: social   • Drug use: No   • Sexual activity: Defer     Other Topics Concern   • Not on file     Social History Narrative        Current Outpatient Prescriptions:   •  atorvastatin (LIPITOR) 80 MG tablet, Take 1 tablet by mouth Every Night., Disp: 30 tablet, Rfl: 6  •  clopidogrel (PLAVIX) 75 MG tablet, Take 1 tablet by mouth Daily., Disp: 30 tablet, Rfl: 6  •  dicyclomine (BENTYL) 20 MG tablet, Take 1 tablet by mouth Every 6 (Six) Hours., Disp: 40 tablet, Rfl: 1  •  Ergocalciferol (VITAMIN D2) 2000 UNITS tablet, Take by mouth daily., Disp: , Rfl:   •  ezetimibe (ZETIA) 10 MG tablet, Take 1 tablet by mouth Daily., Disp: 30 tablet, Rfl: 11  •  levothyroxine (SYNTHROID, LEVOTHROID) 75 MCG tablet, TAKE ONE TABLET BY MOUTH DAILY, Disp: 90 tablet, Rfl: 2  •  pramipexole (MIRAPEX) 0.25 MG tablet, ,  Disp: , Rfl:   •  sertraline (ZOLOFT) 100 MG tablet, Take 1 tablet by mouth Daily., Disp: 30 tablet, Rfl: 3  •  vitamin C (ASCORBIC ACID) 250 MG tablet, Take 250 mg by mouth Daily., Disp: , Rfl:   •  zolpidem (AMBIEN) 5 MG tablet, Take 1 tablet by mouth At Night As Needed for Sleep., Disp: 30 tablet, Rfl: 0  •  hyoscyamine (LEVSIN) 0.125 MG tablet, Take 1 tablet by mouth Every 4 (Four) Hours As Needed for Cramping., Disp: 40 tablet, Rfl: 4     Objective     HPI Comments: She continues to have occasional sharp, crampy abdominal pain and constipation.  Last saw her last, I gave her prescription for Bentyl which she did not find to be useful.  I'll    Abdominal Pain     Cough          Review of Systems   Constitutional: Negative.    Respiratory: Positive for cough.    Cardiovascular: Negative.    Gastrointestinal: Positive for abdominal pain.   Psychiatric/Behavioral: Negative.        Physical Exam   Constitutional: She is oriented to person, place, and time. She appears well-developed and well-nourished.   HENT:   Head: Normocephalic and atraumatic.   Pulmonary/Chest: Effort normal.   Abdominal:   Her abdomen is soft and nontender.   Neurological: She is alert and oriented to person, place, and time.   Psychiatric: Her behavior is normal.   Nursing note and vitals reviewed.        Problem List Items Addressed This Visit     None      Visit Diagnoses     Generalized abdominal pain    -  Primary    Relevant Orders    Ambulatory Referral to Gastroenterology            PLAN  She continues to have abdominal pain almost daily.  This is been going on since before November 2016 when she had a CAT scan which showed diverticulosis without diverticulitis.    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  I'm going to give her prescription for Levsin.  I would like her to see gastroenterology however and I had sent her to see Dr. Sandoval regarding her complaint of abdominal pain.  No Follow-up on file.

## 2017-10-06 RX ORDER — CLOPIDOGREL BISULFATE 75 MG/1
TABLET ORAL
Qty: 30 TABLET | Refills: 0 | Status: SHIPPED | OUTPATIENT
Start: 2017-10-06 | End: 2017-10-31 | Stop reason: SDUPTHER

## 2017-10-06 RX ORDER — ATORVASTATIN CALCIUM 80 MG/1
TABLET, FILM COATED ORAL
Qty: 30 TABLET | Refills: 0 | Status: SHIPPED | OUTPATIENT
Start: 2017-10-06 | End: 2017-10-31 | Stop reason: SDUPTHER

## 2017-10-31 RX ORDER — ATORVASTATIN CALCIUM 80 MG/1
TABLET, FILM COATED ORAL
Qty: 30 TABLET | Refills: 0 | Status: SHIPPED | OUTPATIENT
Start: 2017-10-31 | End: 2017-12-03 | Stop reason: SDUPTHER

## 2017-10-31 RX ORDER — CLOPIDOGREL BISULFATE 75 MG/1
TABLET ORAL
Qty: 30 TABLET | Refills: 0 | Status: SHIPPED | OUTPATIENT
Start: 2017-10-31 | End: 2017-12-03 | Stop reason: SDUPTHER

## 2017-11-03 ENCOUNTER — OFFICE VISIT (OUTPATIENT)
Dept: FAMILY MEDICINE CLINIC | Facility: CLINIC | Age: 77
End: 2017-11-03

## 2017-11-03 VITALS
HEIGHT: 62 IN | OXYGEN SATURATION: 98 % | RESPIRATION RATE: 16 BRPM | SYSTOLIC BLOOD PRESSURE: 120 MMHG | DIASTOLIC BLOOD PRESSURE: 76 MMHG | BODY MASS INDEX: 24.48 KG/M2 | TEMPERATURE: 98.1 F | WEIGHT: 133 LBS | HEART RATE: 64 BPM

## 2017-11-03 DIAGNOSIS — J01.00 ACUTE MAXILLARY SINUSITIS, RECURRENCE NOT SPECIFIED: Primary | ICD-10-CM

## 2017-11-03 PROCEDURE — 99213 OFFICE O/P EST LOW 20 MIN: CPT | Performed by: NURSE PRACTITIONER

## 2017-11-03 RX ORDER — INFLUENZA A VIRUS A/MICHIGAN/45/2015 X-275 (H1N1) ANTIGEN (FORMALDEHYDE INACTIVATED), INFLUENZA A VIRUS A/SINGAPORE/INFIMH-16-0019/2016 IVR-186 (H3N2) ANTIGEN (FORMALDEHYDE INACTIVATED), AND INFLUENZA B VIRUS B/MARYLAND/15/2016 BX-69A (A B/COLORADO/6/2017-LIKE VIRUS) ANTIGEN (FORMALDEHYDE INACTIVATED) 60; 60; 60 UG/.5ML; UG/.5ML; UG/.5ML
INJECTION, SUSPENSION INTRAMUSCULAR
COMMUNITY
Start: 2017-10-18 | End: 2017-11-08

## 2017-11-03 RX ORDER — CEFUROXIME AXETIL 250 MG/1
250 TABLET ORAL 2 TIMES DAILY
Qty: 20 TABLET | Refills: 0 | Status: SHIPPED | OUTPATIENT
Start: 2017-11-03 | End: 2017-12-01

## 2017-11-03 NOTE — PROGRESS NOTES
Subjective   Sheila Burleson is a 77 y.o. female.   Chief Complaint   Patient presents with   • Sinus Problem     pt states having since 10/28/17, pt states cheek pressure, and sharp pain in forehead      Vitals:    11/03/17 1431   BP: 120/76   Pulse: 64   Resp: 16   Temp: 98.1 °F (36.7 °C)   SpO2: 98%     No LMP recorded. Patient is postmenopausal.    History of Present Illness Sinus Pain  Patient complains of congestion, nasal congestion, purulent rhinorrhea, sinus pressure and sore throat. Onset of symptoms was 1 week ago. Symptoms have been gradually worsening since that time. She is drinking plenty of fluids.  Past history is significant for allergic rhinitis and tkea claritin . Patient is non-smoker.     The following portions of the patient's history were reviewed and updated as appropriate: allergies, current medications, past family history, past medical history, past social history, past surgical history and problem list.    Review of Systems   Constitutional: Positive for fatigue. Negative for chills and fever.   HENT: Positive for congestion, postnasal drip, rhinorrhea, sinus pain, sinus pressure and sore throat.    Respiratory: Negative for cough, chest tightness, shortness of breath and wheezing.    Cardiovascular: Negative.    Neurological: Positive for headaches.       Objective   Physical Exam   Constitutional: Vital signs are normal. She appears well-developed and well-nourished. She appears ill.   HENT:   Right Ear: Ear canal normal. Tympanic membrane is bulging.   Left Ear: Ear canal normal. Tympanic membrane is bulging.   Nose: Mucosal edema and rhinorrhea present. Right sinus exhibits maxillary sinus tenderness. Right sinus exhibits no frontal sinus tenderness. Left sinus exhibits maxillary sinus tenderness. Left sinus exhibits no frontal sinus tenderness.   Mouth/Throat: Uvula is midline. Posterior oropharyngeal erythema present.   Cardiovascular: Normal rate and regular rhythm.     Pulmonary/Chest: Effort normal and breath sounds normal.   Neurological: She is alert.       Assessment/Plan   Sheila was seen today for sinus problem.    Diagnoses and all orders for this visit:    Acute maxillary sinusitis, recurrence not specified    Other orders  -     cefuroxime (CEFTIN) 250 MG tablet; Take 1 tablet by mouth 2 (Two) Times a Day. augmentin causes gi upset, has tolerated ceftin      flonase or nascort otc   Sinus rinses with saline or netti pot   Follow up if your symptoms persist, worsen, or if new symptoms develop

## 2017-11-08 ENCOUNTER — OFFICE VISIT (OUTPATIENT)
Dept: GASTROENTEROLOGY | Facility: CLINIC | Age: 77
End: 2017-11-08

## 2017-11-08 VITALS
WEIGHT: 130 LBS | HEART RATE: 74 BPM | BODY MASS INDEX: 23.92 KG/M2 | HEIGHT: 62 IN | SYSTOLIC BLOOD PRESSURE: 156 MMHG | DIASTOLIC BLOOD PRESSURE: 73 MMHG

## 2017-11-08 DIAGNOSIS — Z86.010 HISTORY OF COLON POLYPS: ICD-10-CM

## 2017-11-08 DIAGNOSIS — I63.9 ACUTE CVA (CEREBROVASCULAR ACCIDENT) (HCC): ICD-10-CM

## 2017-11-08 DIAGNOSIS — R10.30 LOWER ABDOMINAL PAIN: ICD-10-CM

## 2017-11-08 DIAGNOSIS — K59.00 CONSTIPATION, UNSPECIFIED CONSTIPATION TYPE: Primary | ICD-10-CM

## 2017-11-08 PROBLEM — Z86.0100 HISTORY OF COLON POLYPS: Status: ACTIVE | Noted: 2017-11-08

## 2017-11-08 PROCEDURE — 99204 OFFICE O/P NEW MOD 45 MIN: CPT | Performed by: INTERNAL MEDICINE

## 2017-11-08 RX ORDER — SODIUM CHLORIDE, SODIUM LACTATE, POTASSIUM CHLORIDE, CALCIUM CHLORIDE 600; 310; 30; 20 MG/100ML; MG/100ML; MG/100ML; MG/100ML
30 INJECTION, SOLUTION INTRAVENOUS CONTINUOUS
Status: CANCELLED | OUTPATIENT
Start: 2017-12-12

## 2017-11-08 NOTE — PROGRESS NOTES
Subjective   Sheila Burleson is a 77 y.o. female is being seen for consultation today at the request of No ref. provider found  Chief Complaint   Patient presents with   • Abdominal Pain   • Constipation   • Gas       Abdominal Pain   This is a recurrent problem. The current episode started more than 1 month ago. The onset quality is gradual. The problem occurs intermittently. The most recent episode lasted 3 days. The problem has been waxing and waning. The pain is located in the suprapubic region. The pain is at a severity of 5/10. The quality of the pain is cramping and a sensation of fullness. The abdominal pain radiates to the suprapubic region. Associated symptoms include arthralgias, belching, constipation, flatus and frequency. Pertinent negatives include no anorexia, diarrhea, dysuria, fever, headaches, hematochezia, hematuria, melena, myalgias, nausea, vomiting or weight loss. Nothing aggravates the pain. The pain is relieved by belching, bowel movements and passing flatus.   Constipation   Associated symptoms include abdominal pain and flatus. Pertinent negatives include no anorexia, diarrhea, fever, hematochezia, melena, nausea, rectal pain, vomiting or weight loss.   Gas   Associated symptoms include abdominal pain and arthralgias. Pertinent negatives include no anorexia, chest pain, diaphoresis, fatigue, fever, headaches, joint swelling, myalgias, nausea, rash, sore throat or vomiting.       The following portions of the patient's history were reviewed and updated as appropriate: allergies, current medications, past family history, past medical history, past social history, past surgical history and problem list.    Review of Systems   Constitutional: Negative for appetite change, diaphoresis, fatigue, fever, unexpected weight change and weight loss.   HENT: Negative for hearing loss, mouth sores, sore throat and trouble swallowing.    Eyes: Negative for pain and redness.   Respiratory: Negative for  choking and shortness of breath.    Cardiovascular: Negative for chest pain and leg swelling.   Gastrointestinal: Positive for abdominal pain, constipation and flatus. Negative for abdominal distention, anal bleeding, anorexia, blood in stool, diarrhea, hematochezia, melena, nausea, rectal pain and vomiting.   Genitourinary: Positive for frequency. Negative for dysuria, flank pain and hematuria.   Musculoskeletal: Positive for arthralgias. Negative for joint swelling and myalgias.   Skin: Negative for color change and rash.   Allergic/Immunologic: Negative for food allergies and immunocompromised state.   Neurological: Negative for dizziness, seizures and headaches.   Hematological: Does not bruise/bleed easily.   Psychiatric/Behavioral: Negative for confusion, sleep disturbance and suicidal ideas. The patient is not nervous/anxious.        Objective   Physical Exam   Constitutional: She is oriented to person, place, and time. She appears well-developed and well-nourished.   HENT:   Head: Normocephalic and atraumatic.   Right Ear: External ear normal.   Left Ear: External ear normal.   Nose: Nose normal.   Eyes: Conjunctivae are normal. Pupils are equal, round, and reactive to light.   Neck: Neck supple. No thyromegaly present.   Cardiovascular: Normal heart sounds.  Exam reveals no gallop and no friction rub.    No murmur heard.  Pulmonary/Chest: Effort normal and breath sounds normal.   Abdominal: Soft. Bowel sounds are normal. She exhibits no distension and no mass. There is no tenderness.   Musculoskeletal: She exhibits no edema.   Neurological: She is alert and oriented to person, place, and time.   Skin: No rash noted. No erythema.   Psychiatric: She has a normal mood and affect. Her behavior is normal.   Nursing note and vitals reviewed.        Assessment/Plan   Problems Addressed this Visit        Cardiovascular and Mediastinum    Acute CVA (cerebrovascular accident)       Digestive    Constipation - Primary     Relevant Orders    Case Request (Completed)       Nervous and Auditory    Lower abdominal pain    Relevant Orders    Case Request (Completed)       Other    History of colon polyps    Relevant Orders    Case Request (Completed)        It is been roughly 5 years since her last colonoscopy.  She appears to be fully recovered from her previous CVA and she is on Plavix as a solo agent.  It would be reasonable to proceed with colonoscopy and she may continue the Plavix right up to the time of the examination.

## 2017-12-01 ENCOUNTER — OFFICE VISIT (OUTPATIENT)
Dept: FAMILY MEDICINE CLINIC | Facility: CLINIC | Age: 77
End: 2017-12-01

## 2017-12-01 VITALS
OXYGEN SATURATION: 97 % | SYSTOLIC BLOOD PRESSURE: 120 MMHG | TEMPERATURE: 98.4 F | HEART RATE: 62 BPM | WEIGHT: 133.8 LBS | BODY MASS INDEX: 24.62 KG/M2 | DIASTOLIC BLOOD PRESSURE: 80 MMHG | HEIGHT: 62 IN | RESPIRATION RATE: 16 BRPM

## 2017-12-01 DIAGNOSIS — J31.0 RHINOSINUSITIS: Primary | ICD-10-CM

## 2017-12-01 DIAGNOSIS — J30.2 CHRONIC SEASONAL ALLERGIC RHINITIS DUE TO OTHER ALLERGEN: ICD-10-CM

## 2017-12-01 DIAGNOSIS — J32.9 RHINOSINUSITIS: Primary | ICD-10-CM

## 2017-12-01 PROCEDURE — 99213 OFFICE O/P EST LOW 20 MIN: CPT | Performed by: NURSE PRACTITIONER

## 2017-12-01 RX ORDER — METHYLPREDNISOLONE 4 MG/1
TABLET ORAL
Qty: 21 TABLET | Refills: 0 | Status: ON HOLD | OUTPATIENT
Start: 2017-12-01 | End: 2017-12-12

## 2017-12-01 NOTE — PROGRESS NOTES
Subjective   Sheila Burleson is a 77 y.o. female.   Chief Complaint   Patient presents with   • Nasal Congestion     pt states started 3-4 days ago 11/26/17   • chest congestion   • Cough   • Dizziness     Vitals:    12/01/17 0854   BP: 120/80   Pulse: 62   Resp: 16   Temp: 98.4 °F (36.9 °C)   SpO2: 97%     No LMP recorded. Patient is postmenopausal.    History of Present Illness  Sheila has had a cough, nasal drainage, dizziness, and sinus pressure for 3-4 days. She denies fever, chills or body aches. She gets allergy injections weekly and has just started flonase. She was treated for sinusitis on 11/2/17 with ceftin and her symptoms resolved.      The following portions of the patient's history were reviewed and updated as appropriate: allergies, current medications, past family history, past medical history, past social history, past surgical history and problem list.    Review of Systems   Constitutional: Negative for chills, fatigue and fever.   HENT: Positive for congestion and rhinorrhea. Negative for ear pain.    Respiratory: Positive for cough. Negative for shortness of breath and wheezing.    Cardiovascular: Negative.    Neurological: Positive for dizziness.       Objective   Physical Exam   Constitutional: Vital signs are normal. She appears well-developed and well-nourished. No distress.   HENT:   Right Ear: Tympanic membrane and ear canal normal.   Left Ear: Tympanic membrane and ear canal normal.   Nose: Mucosal edema (nasal mucosa boggy ) present.   Mouth/Throat: Uvula is midline and oropharynx is clear and moist.   Cardiovascular: Normal rate and regular rhythm.    Pulmonary/Chest: Effort normal and breath sounds normal.   Neurological: She is alert.       Assessment/Plan   Sheila was seen today for nasal congestion, chest congestion, cough and dizziness.    Diagnoses and all orders for this visit:    Rhinosinusitis    Chronic seasonal allergic rhinitis due to other allergen    Other orders  -      MethylPREDNISolone (MEDROL, ALLY,) 4 MG tablet; Take as directed on package instructions.    Continue flonase, sinus rinses, will try medrol dosepak before adding another round of antibiotics  I would like her to follow up with Dr Barnhart in 1-2 weeks to discuss possibly repeating another CT of the sinuses   Follow up sooner if symptoms worsen, or if new symptoms develop

## 2017-12-04 RX ORDER — SERTRALINE HYDROCHLORIDE 100 MG/1
TABLET, FILM COATED ORAL
Qty: 30 TABLET | Refills: 2 | OUTPATIENT
Start: 2017-12-04

## 2017-12-04 RX ORDER — ATORVASTATIN CALCIUM 80 MG/1
TABLET, FILM COATED ORAL
Qty: 30 TABLET | Refills: 0 | Status: SHIPPED | OUTPATIENT
Start: 2017-12-04 | End: 2018-01-04 | Stop reason: SDUPTHER

## 2017-12-04 RX ORDER — CLOPIDOGREL BISULFATE 75 MG/1
TABLET ORAL
Qty: 30 TABLET | Refills: 0 | Status: SHIPPED | OUTPATIENT
Start: 2017-12-04 | End: 2018-01-04 | Stop reason: SDUPTHER

## 2017-12-05 RX ORDER — SERTRALINE HYDROCHLORIDE 100 MG/1
TABLET, FILM COATED ORAL
Qty: 30 TABLET | Refills: 2 | OUTPATIENT
Start: 2017-12-05

## 2017-12-05 RX ORDER — SERTRALINE HYDROCHLORIDE 100 MG/1
100 TABLET, FILM COATED ORAL DAILY
Qty: 30 TABLET | Refills: 3 | Status: SHIPPED | OUTPATIENT
Start: 2017-12-05 | End: 2018-04-23 | Stop reason: SDUPTHER

## 2017-12-12 ENCOUNTER — HOSPITAL ENCOUNTER (OUTPATIENT)
Facility: HOSPITAL | Age: 77
Setting detail: HOSPITAL OUTPATIENT SURGERY
Discharge: HOME | End: 2017-12-12
Attending: INTERNAL MEDICINE | Admitting: INTERNAL MEDICINE

## 2017-12-12 ENCOUNTER — ANESTHESIA (OUTPATIENT)
Dept: GASTROENTEROLOGY | Facility: HOSPITAL | Age: 77
End: 2017-12-12

## 2017-12-12 ENCOUNTER — ANESTHESIA EVENT (OUTPATIENT)
Dept: GASTROENTEROLOGY | Facility: HOSPITAL | Age: 77
End: 2017-12-12

## 2017-12-12 VITALS
TEMPERATURE: 98.1 F | RESPIRATION RATE: 16 BRPM | SYSTOLIC BLOOD PRESSURE: 178 MMHG | OXYGEN SATURATION: 98 % | HEIGHT: 62 IN | BODY MASS INDEX: 24.11 KG/M2 | HEART RATE: 63 BPM | DIASTOLIC BLOOD PRESSURE: 89 MMHG | WEIGHT: 131 LBS

## 2017-12-12 DIAGNOSIS — R10.30 LOWER ABDOMINAL PAIN: ICD-10-CM

## 2017-12-12 DIAGNOSIS — K59.00 CONSTIPATION, UNSPECIFIED CONSTIPATION TYPE: ICD-10-CM

## 2017-12-12 DIAGNOSIS — Z86.010 HISTORY OF COLON POLYPS: ICD-10-CM

## 2017-12-12 PROCEDURE — 45378 DIAGNOSTIC COLONOSCOPY: CPT | Performed by: INTERNAL MEDICINE

## 2017-12-12 PROCEDURE — 25010000002 PROPOFOL 10 MG/ML EMULSION: Performed by: ANESTHESIOLOGY

## 2017-12-12 RX ORDER — SODIUM CHLORIDE, SODIUM LACTATE, POTASSIUM CHLORIDE, CALCIUM CHLORIDE 600; 310; 30; 20 MG/100ML; MG/100ML; MG/100ML; MG/100ML
30 INJECTION, SOLUTION INTRAVENOUS CONTINUOUS
Status: DISCONTINUED | OUTPATIENT
Start: 2017-12-12 | End: 2017-12-12 | Stop reason: HOSPADM

## 2017-12-12 RX ORDER — PROPOFOL 10 MG/ML
VIAL (ML) INTRAVENOUS AS NEEDED
Status: DISCONTINUED | OUTPATIENT
Start: 2017-12-12 | End: 2017-12-12 | Stop reason: SURG

## 2017-12-12 RX ORDER — LIDOCAINE HYDROCHLORIDE 20 MG/ML
INJECTION, SOLUTION INFILTRATION; PERINEURAL AS NEEDED
Status: DISCONTINUED | OUTPATIENT
Start: 2017-12-12 | End: 2017-12-12 | Stop reason: SURG

## 2017-12-12 RX ORDER — PROPOFOL 10 MG/ML
VIAL (ML) INTRAVENOUS CONTINUOUS PRN
Status: DISCONTINUED | OUTPATIENT
Start: 2017-12-12 | End: 2017-12-12 | Stop reason: SURG

## 2017-12-12 RX ORDER — SODIUM CHLORIDE 0.9 % (FLUSH) 0.9 %
3 SYRINGE (ML) INJECTION AS NEEDED
Status: DISCONTINUED | OUTPATIENT
Start: 2017-12-12 | End: 2017-12-12 | Stop reason: HOSPADM

## 2017-12-12 RX ADMIN — LIDOCAINE HYDROCHLORIDE 60 MG: 20 INJECTION, SOLUTION INFILTRATION; PERINEURAL at 09:35

## 2017-12-12 RX ADMIN — SODIUM CHLORIDE, POTASSIUM CHLORIDE, SODIUM LACTATE AND CALCIUM CHLORIDE 30 ML/HR: 600; 310; 30; 20 INJECTION, SOLUTION INTRAVENOUS at 09:29

## 2017-12-12 RX ADMIN — PROPOFOL 100 MG: 10 INJECTION, EMULSION INTRAVENOUS at 09:35

## 2017-12-12 RX ADMIN — SODIUM CHLORIDE, POTASSIUM CHLORIDE, SODIUM LACTATE AND CALCIUM CHLORIDE: 600; 310; 30; 20 INJECTION, SOLUTION INTRAVENOUS at 09:30

## 2017-12-12 RX ADMIN — PROPOFOL 100 MCG/KG/MIN: 10 INJECTION, EMULSION INTRAVENOUS at 09:35

## 2017-12-12 NOTE — ANESTHESIA POSTPROCEDURE EVALUATION
"Patient: Sheila Burleson    Procedure Summary     Date Anesthesia Start Anesthesia Stop Room / Location    12/12/17 0932 1000  [facility] /  [facility]       Procedure Diagnosis Surgeon Provider    COLONOSCOPY into cecum (N/A ) Lower abdominal pain; History of colon polyps; Constipation, unspecified constipation type  (Lower abdominal pain [R10.30]; History of colon polyps [Z86.010]; Constipation, unspecified constipation type [K59.00]) MD Zechariah Zapien MD          Anesthesia Type: MAC  Last vitals  BP   114/62 (12/12/17 1000)   Temp   36.7 °C (98.1 °F) (12/12/17 1000)   Pulse   79 (12/12/17 1000)   Resp   16 (12/12/17 1000)     SpO2   95 % (12/12/17 1000)     Post Anesthesia Care and Evaluation    Patient location during evaluation: PACU  Patient participation: complete - patient participated  Level of consciousness: awake  Pain score: 0  Pain management: adequate  Airway patency: patent  Anesthetic complications: No anesthetic complications  PONV Status: none  Cardiovascular status: acceptable  Respiratory status: acceptable  Hydration status: acceptable    Comments: /62 (BP Location: Left arm, Patient Position: Lying)  Pulse 79  Temp 36.7 °C (98.1 °F) (Oral)   Resp 16  Ht 157.5 cm (62\")  Wt 59.4 kg (131 lb)  SpO2 95%  BMI 23.96 kg/m2      "

## 2017-12-12 NOTE — H&P
CC: Here for endoscopic evaluation.     HPI: Abdominal Pain   This is a recurrent problem. The current episode started more than 1 month ago. The onset quality is gradual. The problem occurs intermittently. The most recent episode lasted 3 days. The problem has been waxing and waning. The pain is located in the suprapubic region. The pain is at a severity of 5/10. The quality of the pain is cramping and a sensation of fullness. The abdominal pain radiates to the suprapubic region. Associated symptoms include arthralgias, belching, constipation, flatus and frequency. Pertinent negatives include no anorexia, diarrhea, dysuria, fever, headaches, hematochezia, hematuria, melena, myalgias, nausea, vomiting or weight loss. Nothing aggravates the pain. The pain is relieved by belching, bowel movements and passing flatus.   Constipation   Associated symptoms include abdominal pain and flatus. Pertinent negatives include no anorexia, diarrhea, fever, hematochezia, melena, nausea, rectal pain, vomiting or weight loss.   Gas   Associated symptoms include abdominal pain and arthralgias. Pertinent negatives include no anorexia, chest pain, diaphoresis, fatigue, fever, headaches, joint swelling, myalgias, nausea, rash, sore throat or vomiting.    Past Medical History:   Diagnosis Date   • Allergy desensitization therapy     gets shot weekly   • Daytime sleepiness    • Depression    • Diverticulosis    • Head trauma    • History of colon polyps    • Hyperlipidemia    • Hypertension    • Hypothyroidism    • Irritable bowel syndrome    • Multilevel degenerative disc disease    • BALBINA on CPAP    • Osteoarthritis    • Sialodocholithiasis    • Sleep apnea    • Stroke    • Thyroid disease    • TIA (transient ischemic attack) 03/2017       Past Surgical History:   Procedure Laterality Date   • APPENDECTOMY     • CATARACT EXTRACTION W/ INTRAOCULAR LENS  IMPLANT, BILATERAL     • CHOLECYSTECTOMY     • COLON SURGERY     • COLONOSCOPY  2012     RECALL JAN 2018   • COLOSTOMY CLOSURE     • HERNIA REPAIR     • HYSTERECTOMY     • TUBAL ABDOMINAL LIGATION     • UPPER GASTROINTESTINAL ENDOSCOPY         Prior to Admission medications    Medication Sig Start Date End Date Taking? Authorizing Provider   atorvastatin (LIPITOR) 80 MG tablet TAKE ONE TABLET BY MOUTH ONCE NIGHTLY 12/4/17  Yes Silvano Barnhart MD   clopidogrel (PLAVIX) 75 MG tablet TAKE ONE TABLET BY MOUTH DAILY 12/4/17  Yes Silvano Barnhart MD   Ergocalciferol (VITAMIN D2) 2000 UNITS tablet Take by mouth daily. 5/14/14  Yes Historical Provider, MD   ezetimibe (ZETIA) 10 MG tablet Take 1 tablet by mouth Daily. 3/10/17  Yes Silvano Barnhart MD   levothyroxine (SYNTHROID, LEVOTHROID) 75 MCG tablet TAKE ONE TABLET BY MOUTH DAILY 6/5/17  Yes Silvano Barnhart MD   sertraline (ZOLOFT) 100 MG tablet Take 1 tablet (100 mg total) by mouth Daily 12/5/17  Yes Silvano Barnhart MD   vitamin C (ASCORBIC ACID) 250 MG tablet Take 250 mg by mouth Daily.   Yes Historical Provider, MD   dicyclomine (BENTYL) 20 MG tablet Take 1 tablet by mouth Every 6 (Six) Hours. 9/11/17   Silvano Barnhart MD   hyoscyamine (LEVSIN) 0.125 MG tablet Take 1 tablet by mouth Every 4 (Four) Hours As Needed for Cramping. 9/27/17   Silvano Barnhart MD   zolpidem (AMBIEN) 5 MG tablet Take 1 tablet by mouth At Night As Needed for Sleep. 9/5/17   Silvano Barnhart MD   MethylPREDNISolone (MEDROL, ALLY,) 4 MG tablet Take as directed on package instructions. 12/1/17 12/12/17  KAREN Murray       Allergies   Allergen Reactions   • Amoxicillin-Pot Clavulanate Diarrhea     Gi upset    • Clarithromycin Nausea Only   • Levofloxacin Nausea Only   • Mirapex [Pramipexole] Nausea Only   • Pollen Extract        Family History   Problem Relation Age of Onset   • Stroke Mother    • Hypertension Mother    • Thyroid disease Mother    • Heart disease Father    • Arthritis Father        OBJECTIVE:    Patient's vital signs reviewed. No acute distress.     Chest  is clear, no wheezing or rales. Normal symmetric air entry throughout both lung fields. No chest wall deformities or tenderness.    S1 and S2 normal, no murmurs, clicks, gallops or rubs. Regular rate and rhythm. Chest is clear; no wheezes or rales. No edema or JVD.    The abdomen is soft without tenderness, guarding, mass, rebound or organomegaly. Bowel sounds are normal. No CVA tenderness or inguinal adenopathy noted.    Patient is alert, oriented and with an intact memory.    Assessment: Change in bowel habits.     Plan: Colonoscopy.

## 2017-12-12 NOTE — ANESTHESIA PREPROCEDURE EVALUATION
Anesthesia Evaluation     Patient summary reviewed and Nursing notes reviewed          Airway   Mallampati: I  TM distance: <3 FB  Neck ROM: full  no difficulty expected  Dental - normal exam     Pulmonary - normal exam   (+) sleep apnea,   Cardiovascular - normal exam    (+) hypertension, hyperlipidemia      Neuro/Psych  (+) TIA, CVA, headaches, psychiatric history,    GI/Hepatic/Renal/Endo    (+)  GERD, hypothyroidism,     Musculoskeletal     Abdominal  - normal exam    Bowel sounds: normal.   Substance History - negative use     OB/GYN negative ob/gyn ROS         Other   (+) arthritis                                     Anesthesia Plan    ASA 3     MAC     Anesthetic plan and risks discussed with patient.

## 2017-12-12 NOTE — PLAN OF CARE
Problem: Patient Care Overview (Adult)  Goal: Plan of Care Review  Outcome: Ongoing (interventions implemented as appropriate)    12/12/17 0901   Coping/Psychosocial Response Interventions   Plan Of Care Reviewed With patient   Patient Care Overview   Progress progress toward functional goals as expected       Goal: Adult Individualization and Mutuality  Outcome: Ongoing (interventions implemented as appropriate)  Goal: Discharge Needs Assessment  Outcome: Ongoing (interventions implemented as appropriate)    12/12/17 0901   Discharge Needs Assessment   Concerns To Be Addressed no discharge needs identified   Discharge Disposition home or self-care   Living Environment   Transportation Available car;family or friend will provide         Problem: GI Endoscopy (Adult)  Intervention: Monitor/Manage Procedure Recovery    12/12/17 0901   Respiratory Interventions   Airway/Ventilation Management airway patency maintained   Coping/Psychosocial Interventions   Environmental Support calm environment promoted   Activity   Activity Type activity adjusted per tolerance       Intervention: Prevent Daina-procedural Injury    12/12/17 0901   Positioning   Positioning side lying, left         Goal: Signs and Symptoms of Listed Potential Problems Will be Absent or Manageable (GI Endoscopy)  Outcome: Ongoing (interventions implemented as appropriate)    12/12/17 0901   GI Endoscopy   Problems Assessed (GI Endoscopy) all   Problems Present (GI Endoscopy) none

## 2017-12-22 ENCOUNTER — OFFICE VISIT (OUTPATIENT)
Dept: FAMILY MEDICINE CLINIC | Facility: CLINIC | Age: 77
End: 2017-12-22

## 2017-12-22 VITALS
BODY MASS INDEX: 24.66 KG/M2 | SYSTOLIC BLOOD PRESSURE: 134 MMHG | RESPIRATION RATE: 16 BRPM | OXYGEN SATURATION: 98 % | HEART RATE: 65 BPM | TEMPERATURE: 98 F | DIASTOLIC BLOOD PRESSURE: 74 MMHG | WEIGHT: 134 LBS | HEIGHT: 62 IN

## 2017-12-22 DIAGNOSIS — Z87.898 HISTORY OF VERTIGO: Primary | ICD-10-CM

## 2017-12-22 PROCEDURE — 99213 OFFICE O/P EST LOW 20 MIN: CPT | Performed by: INTERNAL MEDICINE

## 2017-12-22 RX ORDER — CEFUROXIME AXETIL 250 MG/1
250 TABLET ORAL DAILY
Qty: 20 TABLET | Refills: 0 | Status: SHIPPED | OUTPATIENT
Start: 2017-12-22 | End: 2018-02-20

## 2017-12-26 PROBLEM — Z87.898 HISTORY OF VERTIGO: Status: ACTIVE | Noted: 2017-12-26

## 2017-12-26 NOTE — PROGRESS NOTES
Subjective   Sheila Burleson is a 77 y.o. female. Patient is here today for   Chief Complaint   Patient presents with   • Dizziness     2 week f/u           Vitals:    12/22/17 1536   BP: 134/74   Pulse: 65   Resp: 16   Temp: 98 °F (36.7 °C)   SpO2: 98%       Past Medical History:   Diagnosis Date   • Allergy desensitization therapy     gets shot weekly   • Daytime sleepiness    • Depression    • Diverticulosis    • Head trauma    • History of colon polyps    • Hyperlipidemia    • Hypertension    • Hypothyroidism    • Irritable bowel syndrome    • Multilevel degenerative disc disease    • BALBINA on CPAP    • Osteoarthritis    • Sialodocholithiasis    • Sleep apnea    • Stroke    • Thyroid disease    • TIA (transient ischemic attack) 03/2017      Allergies   Allergen Reactions   • Amoxicillin-Pot Clavulanate Diarrhea     Gi upset    • Clarithromycin Nausea Only   • Levofloxacin Nausea Only   • Mirapex [Pramipexole] Nausea Only   • Pollen Extract       Social History     Social History   • Marital status: Single     Spouse name: N/A   • Number of children: N/A   • Years of education: N/A     Occupational History   • Not on file.     Social History Main Topics   • Smoking status: Never Smoker   • Smokeless tobacco: Never Used   • Alcohol use No   • Drug use: No   • Sexual activity: No     Other Topics Concern   • Not on file     Social History Narrative        Current Outpatient Prescriptions:   •  atorvastatin (LIPITOR) 80 MG tablet, TAKE ONE TABLET BY MOUTH ONCE NIGHTLY, Disp: 30 tablet, Rfl: 0  •  clopidogrel (PLAVIX) 75 MG tablet, TAKE ONE TABLET BY MOUTH DAILY, Disp: 30 tablet, Rfl: 0  •  dicyclomine (BENTYL) 20 MG tablet, Take 1 tablet by mouth Every 6 (Six) Hours., Disp: 40 tablet, Rfl: 1  •  Ergocalciferol (VITAMIN D2) 2000 UNITS tablet, Take by mouth daily., Disp: , Rfl:   •  ezetimibe (ZETIA) 10 MG tablet, Take 1 tablet by mouth Daily., Disp: 30 tablet, Rfl: 11  •  hyoscyamine (LEVSIN) 0.125 MG tablet, Take 1  tablet by mouth Every 4 (Four) Hours As Needed for Cramping., Disp: 40 tablet, Rfl: 4  •  levothyroxine (SYNTHROID, LEVOTHROID) 75 MCG tablet, TAKE ONE TABLET BY MOUTH DAILY, Disp: 90 tablet, Rfl: 2  •  sertraline (ZOLOFT) 100 MG tablet, Take 1 tablet (100 mg total) by mouth Daily, Disp: 30 tablet, Rfl: 3  •  vitamin C (ASCORBIC ACID) 250 MG tablet, Take 250 mg by mouth Daily., Disp: , Rfl:   •  zolpidem (AMBIEN) 5 MG tablet, Take 1 tablet by mouth At Night As Needed for Sleep., Disp: 30 tablet, Rfl: 0  •  cefuroxime (CEFTIN) 250 MG tablet, Take 1 tablet by mouth Daily., Disp: 20 tablet, Rfl: 0     Objective     HPI Comments:  She was dizzy a couple weeks ago.  She does not feel like to palpate this felt a little off balance.  The symptoms occur for a few minutes at a time before they resolved generally.  When she was dizzy, there was no associated palpitations, chest pain, nausea, or diaphoresis.    She denies any ear symptoms.    The symptoms have resolved entirely now.    Dizziness          Review of Systems   Constitutional: Negative.    HENT: Negative.    Respiratory: Negative.    Cardiovascular: Negative.    Musculoskeletal: Negative.    Neurological:        She no longer complains of dizziness.   Psychiatric/Behavioral: Negative.        Physical Exam   Constitutional: She is oriented to person, place, and time. She appears well-developed and well-nourished.   Pleasant, neatly groomed, BMI 24.   HENT:   Head: Normocephalic and atraumatic.   Right Ear: External ear normal.   Left Ear: External ear normal.   Nose: Nose normal.   Mouth/Throat: Oropharynx is clear and moist. No oropharyngeal exudate.   Eyes:   No nystagmus   Cardiovascular: Normal rate, regular rhythm and normal heart sounds.    Pulmonary/Chest: Effort normal.   Neurological: She is alert and oriented to person, place, and time.   Psychiatric: She has a normal mood and affect. Her behavior is normal.   Nursing note and vitals  reviewed.        Problem List Items Addressed This Visit        Other    History of vertigo - Primary            PLAN  Her vertigo appears to have been transient.  In any case, it is resolved.  She will let me know if her symptoms reappear.  No Follow-up on file.

## 2018-01-05 ENCOUNTER — TELEPHONE (OUTPATIENT)
Dept: FAMILY MEDICINE CLINIC | Facility: CLINIC | Age: 78
End: 2018-01-05

## 2018-01-05 RX ORDER — ATORVASTATIN CALCIUM 80 MG/1
TABLET, FILM COATED ORAL
Qty: 30 TABLET | Refills: 11 | Status: SHIPPED | OUTPATIENT
Start: 2018-01-05 | End: 2020-10-21 | Stop reason: HOSPADM

## 2018-01-05 RX ORDER — CLOPIDOGREL BISULFATE 75 MG/1
TABLET ORAL
Qty: 30 TABLET | Refills: 11 | Status: SHIPPED | OUTPATIENT
Start: 2018-01-05 | End: 2019-01-05 | Stop reason: SDUPTHER

## 2018-01-05 NOTE — TELEPHONE ENCOUNTER
AFTER SPEAKING TO DR. WISEMAN, I CALLED THE PATIENT AND INFORMED HER THAT SHE COULD STOP THE PLAVIX TODAY SINCE HER SURGERY IS Tuesday.  SHE EXPRESSED UNDERSTANDING   ----- Message from Velvet Pineda sent at 1/5/2018  9:10 AM EST -----  PT FELL AND BROKE HER WRISTE AND WAS TOLD TO CALL TO SEE IF IT WAS OK TO STOP HER BLOOD THINNER FOR HER SURGERY Tuesday.    PLEASE CALL PT BACK [phone]

## 2018-01-17 RX ORDER — EZETIMIBE 10 MG/1
10 TABLET ORAL DAILY
Qty: 30 TABLET | Refills: 11 | Status: SHIPPED | OUTPATIENT
Start: 2018-01-17 | End: 2022-03-17 | Stop reason: HOSPADM

## 2018-01-31 RX ORDER — OSELTAMIVIR PHOSPHATE 75 MG/1
75 CAPSULE ORAL DAILY
Qty: 10 CAPSULE | Refills: 0 | Status: SHIPPED | OUTPATIENT
Start: 2018-01-31 | End: 2018-02-20

## 2018-02-16 DIAGNOSIS — Z11.59 NEED FOR HEPATITIS C SCREENING TEST: ICD-10-CM

## 2018-02-16 DIAGNOSIS — E78.5 HYPERLIPIDEMIA, UNSPECIFIED HYPERLIPIDEMIA TYPE: ICD-10-CM

## 2018-02-16 DIAGNOSIS — Z79.899 LONG TERM USE OF DRUG: Primary | ICD-10-CM

## 2018-02-20 ENCOUNTER — OFFICE VISIT (OUTPATIENT)
Dept: FAMILY MEDICINE CLINIC | Facility: CLINIC | Age: 78
End: 2018-02-20

## 2018-02-20 VITALS
HEIGHT: 62 IN | RESPIRATION RATE: 16 BRPM | SYSTOLIC BLOOD PRESSURE: 130 MMHG | TEMPERATURE: 97.9 F | OXYGEN SATURATION: 98 % | HEART RATE: 70 BPM | WEIGHT: 135.4 LBS | BODY MASS INDEX: 24.92 KG/M2 | DIASTOLIC BLOOD PRESSURE: 80 MMHG

## 2018-02-20 DIAGNOSIS — M25.511 RIGHT SHOULDER PAIN, UNSPECIFIED CHRONICITY: Primary | ICD-10-CM

## 2018-02-20 PROCEDURE — 99213 OFFICE O/P EST LOW 20 MIN: CPT | Performed by: INTERNAL MEDICINE

## 2018-02-20 PROCEDURE — 73030 X-RAY EXAM OF SHOULDER: CPT | Performed by: INTERNAL MEDICINE

## 2018-02-20 NOTE — PROGRESS NOTES
Subjective   Sheila Burleson is a 77 y.o. female. Patient is here today for   Chief Complaint   Patient presents with   • Shoulder Pain     pt states dog jumped on her and she feel to the right and is now having shoulder and arm pain, happened sunday 02/18/18   • Arm Pain          Vitals:    02/20/18 0828   BP: 130/80   Pulse: 70   Resp: 16   Temp: 97.9 °F (36.6 °C)   SpO2: 98%       Past Medical History:   Diagnosis Date   • Allergy desensitization therapy     gets shot weekly   • Daytime sleepiness    • Depression    • Diverticulosis    • Head trauma    • History of colon polyps    • Hyperlipidemia    • Hypertension    • Hypothyroidism    • Irritable bowel syndrome    • Multilevel degenerative disc disease    • BALBINA on CPAP    • Osteoarthritis    • Sialodocholithiasis    • Sleep apnea    • Stroke    • Thyroid disease    • TIA (transient ischemic attack) 03/2017      Allergies   Allergen Reactions   • Amoxicillin-Pot Clavulanate Diarrhea     Gi upset    • Clarithromycin Nausea Only   • Levofloxacin Nausea Only   • Mirapex [Pramipexole] Nausea Only   • Pollen Extract       Social History     Social History   • Marital status: Single     Spouse name: N/A   • Number of children: N/A   • Years of education: N/A     Occupational History   • Not on file.     Social History Main Topics   • Smoking status: Never Smoker   • Smokeless tobacco: Never Used   • Alcohol use No   • Drug use: No   • Sexual activity: No     Other Topics Concern   • Not on file     Social History Narrative        Current Outpatient Prescriptions:   •  atorvastatin (LIPITOR) 80 MG tablet, TAKE ONE TABLET BY MOUTH ONCE NIGHTLY, Disp: 30 tablet, Rfl: 11  •  clopidogrel (PLAVIX) 75 MG tablet, TAKE ONE TABLET BY MOUTH DAILY, Disp: 30 tablet, Rfl: 11  •  dicyclomine (BENTYL) 20 MG tablet, Take 1 tablet by mouth Every 6 (Six) Hours., Disp: 40 tablet, Rfl: 1  •  Ergocalciferol (VITAMIN D2) 2000 UNITS tablet, Take by mouth daily., Disp: , Rfl:   •  ezetimibe  (ZETIA) 10 MG tablet, Take 1 tablet by mouth Daily., Disp: 30 tablet, Rfl: 11  •  hyoscyamine (LEVSIN) 0.125 MG tablet, Take 1 tablet by mouth Every 4 (Four) Hours As Needed for Cramping., Disp: 40 tablet, Rfl: 4  •  levothyroxine (SYNTHROID, LEVOTHROID) 75 MCG tablet, TAKE ONE TABLET BY MOUTH DAILY, Disp: 90 tablet, Rfl: 2  •  sertraline (ZOLOFT) 100 MG tablet, Take 1 tablet (100 mg total) by mouth Daily, Disp: 30 tablet, Rfl: 3  •  vitamin C (ASCORBIC ACID) 250 MG tablet, Take 250 mg by mouth Daily., Disp: , Rfl:   •  zolpidem (AMBIEN) 5 MG tablet, Take 1 tablet by mouth At Night As Needed for Sleep., Disp: 30 tablet, Rfl: 0     Objective     HPI Comments: This patient was knocked down by her daughter's dog.  She landed on her right shoulder.  This happened 2 days ago.  She has pain in the right shoulder.  She tells me that she has a full range of motion though it does hurt.    Arm Pain           Review of Systems   Constitutional: Negative.    HENT: Negative.    Respiratory: Negative.    Musculoskeletal:        Right shoulder pain.   Psychiatric/Behavioral: Negative.        Physical Exam   Constitutional: She is oriented to person, place, and time. She appears well-developed and well-nourished.   HENT:   Head: Normocephalic and atraumatic.   Pulmonary/Chest: Effort normal.   Musculoskeletal:   She has a contusion at the point of her right shoulder.  There is pain on palpation of the right shoulder.  She has a full range of motion.   Neurological: She is alert and oriented to person, place, and time.   Psychiatric: She has a normal mood and affect. Her behavior is normal.   Nursing note and vitals reviewed.        Problem List Items Addressed This Visit     None      Visit Diagnoses     Right shoulder pain, unspecified chronicity    -  Primary    Relevant Orders    XR Shoulder 2+ View Right (In Office) (Completed)        Right shoulder film shows no fracture of the scapula or clavicle or humerus.    PLAN  She has  a contusion of her right shoulder.  This should heal gradually over the next couple weeks and she will let me know if it does not.  She may take over-the-counter anti-inflammatories when necessary.    She has an appointment to see me in about a week and a half anyway.  I will reassess her at that time.  No Follow-up on file.

## 2018-02-22 ENCOUNTER — TELEPHONE (OUTPATIENT)
Dept: FAMILY MEDICINE CLINIC | Facility: CLINIC | Age: 78
End: 2018-02-22

## 2018-02-22 NOTE — TELEPHONE ENCOUNTER
NO PA REQUIRED FOR ZETIA 10MG CALLED PHARMACY AND NOTIFIED THEM AND THEY TOOK CARE O FIT, PT SHOULD BE ABLE TO FILL IT NOW.

## 2018-02-28 LAB
ALBUMIN SERPL-MCNC: 4.3 G/DL (ref 3.5–5.2)
ALBUMIN/GLOB SERPL: 2 G/DL
ALP SERPL-CCNC: 94 U/L (ref 39–117)
ALT SERPL-CCNC: 23 U/L (ref 1–33)
AST SERPL-CCNC: 25 U/L (ref 1–32)
BILIRUB SERPL-MCNC: 0.5 MG/DL (ref 0.1–1.2)
BUN SERPL-MCNC: 15 MG/DL (ref 8–23)
BUN/CREAT SERPL: 20.5 (ref 7–25)
CALCIUM SERPL-MCNC: 9.7 MG/DL (ref 8.6–10.5)
CHLORIDE SERPL-SCNC: 105 MMOL/L (ref 98–107)
CHOLEST SERPL-MCNC: 103 MG/DL (ref 0–200)
CO2 SERPL-SCNC: 26.3 MMOL/L (ref 22–29)
CREAT SERPL-MCNC: 0.73 MG/DL (ref 0.57–1)
GFR SERPLBLD CREATININE-BSD FMLA CKD-EPI: 77 ML/MIN/1.73
GFR SERPLBLD CREATININE-BSD FMLA CKD-EPI: 93 ML/MIN/1.73
GLOBULIN SER CALC-MCNC: 2.2 GM/DL
GLUCOSE SERPL-MCNC: 90 MG/DL (ref 65–99)
HCV AB S/CO SERPL IA: <0.1 S/CO RATIO (ref 0–0.9)
HDLC SERPL-MCNC: 51 MG/DL (ref 40–60)
LDLC SERPL CALC-MCNC: 36 MG/DL (ref 0–100)
LDLC/HDLC SERPL: 0.71 {RATIO}
POTASSIUM SERPL-SCNC: 4.4 MMOL/L (ref 3.5–5.2)
PROT SERPL-MCNC: 6.5 G/DL (ref 6–8.5)
SODIUM SERPL-SCNC: 144 MMOL/L (ref 136–145)
TRIGL SERPL-MCNC: 79 MG/DL (ref 0–150)
VLDLC SERPL CALC-MCNC: 15.8 MG/DL (ref 5–40)

## 2018-03-06 DIAGNOSIS — S62.102A CLOSED FRACTURE OF LEFT WRIST, INITIAL ENCOUNTER: ICD-10-CM

## 2018-03-06 DIAGNOSIS — Z78.0 POST-MENOPAUSAL: Primary | ICD-10-CM

## 2018-03-09 ENCOUNTER — OFFICE VISIT (OUTPATIENT)
Dept: FAMILY MEDICINE CLINIC | Facility: CLINIC | Age: 78
End: 2018-03-09

## 2018-03-09 VITALS
TEMPERATURE: 98.2 F | HEART RATE: 72 BPM | DIASTOLIC BLOOD PRESSURE: 82 MMHG | SYSTOLIC BLOOD PRESSURE: 142 MMHG | BODY MASS INDEX: 24.33 KG/M2 | HEIGHT: 62 IN | WEIGHT: 132.2 LBS | RESPIRATION RATE: 16 BRPM | OXYGEN SATURATION: 98 %

## 2018-03-09 DIAGNOSIS — E78.00 HYPERCHOLESTEROLEMIA: ICD-10-CM

## 2018-03-09 DIAGNOSIS — I10 ESSENTIAL HYPERTENSION: Primary | ICD-10-CM

## 2018-03-09 PROCEDURE — 99213 OFFICE O/P EST LOW 20 MIN: CPT | Performed by: INTERNAL MEDICINE

## 2018-03-09 RX ORDER — CEFUROXIME AXETIL 250 MG/1
TABLET ORAL
COMMUNITY
Start: 2018-03-05 | End: 2018-03-27

## 2018-03-09 RX ORDER — LEVOTHYROXINE SODIUM 0.07 MG/1
TABLET ORAL
Qty: 90 TABLET | Refills: 1 | Status: SHIPPED | OUTPATIENT
Start: 2018-03-09 | End: 2018-08-29 | Stop reason: SDUPTHER

## 2018-03-09 NOTE — PROGRESS NOTES
Subjective   Sheila Burleson is a 77 y.o. female. Patient is here today for   Chief Complaint   Patient presents with   • Follow-up     hypertension           Vitals:    03/09/18 0839   BP: 142/82   Pulse: 72   Resp: 16   Temp: 98.2 °F (36.8 °C)   SpO2: 98%       Past Medical History:   Diagnosis Date   • Allergy desensitization therapy     gets shot weekly   • Daytime sleepiness    • Depression    • Diverticulosis    • Head trauma    • History of colon polyps    • Hyperlipidemia    • Hypertension    • Hypothyroidism    • Irritable bowel syndrome    • Multilevel degenerative disc disease    • BALBINA on CPAP    • Osteoarthritis    • Sialodocholithiasis    • Sleep apnea    • Stroke    • Thyroid disease    • TIA (transient ischemic attack) 03/2017      Allergies   Allergen Reactions   • Amoxicillin-Pot Clavulanate Diarrhea     Gi upset    • Clarithromycin Nausea Only   • Levofloxacin Nausea Only   • Mirapex [Pramipexole] Nausea Only   • Pollen Extract       Social History     Social History   • Marital status: Single     Spouse name: N/A   • Number of children: N/A   • Years of education: N/A     Occupational History   • Not on file.     Social History Main Topics   • Smoking status: Never Smoker   • Smokeless tobacco: Never Used   • Alcohol use No   • Drug use: No   • Sexual activity: No     Other Topics Concern   • Not on file     Social History Narrative        Current Outpatient Prescriptions:   •  atorvastatin (LIPITOR) 80 MG tablet, TAKE ONE TABLET BY MOUTH ONCE NIGHTLY, Disp: 30 tablet, Rfl: 11  •  cefuroxime (CEFTIN) 250 MG tablet, , Disp: , Rfl:   •  clopidogrel (PLAVIX) 75 MG tablet, TAKE ONE TABLET BY MOUTH DAILY, Disp: 30 tablet, Rfl: 11  •  dicyclomine (BENTYL) 20 MG tablet, Take 1 tablet by mouth Every 6 (Six) Hours., Disp: 40 tablet, Rfl: 1  •  Ergocalciferol (VITAMIN D2) 2000 UNITS tablet, Take by mouth daily., Disp: , Rfl:   •  ezetimibe (ZETIA) 10 MG tablet, Take 1 tablet by mouth Daily., Disp: 30 tablet,  Rfl: 11  •  hyoscyamine (LEVSIN) 0.125 MG tablet, Take 1 tablet by mouth Every 4 (Four) Hours As Needed for Cramping., Disp: 40 tablet, Rfl: 4  •  levothyroxine (SYNTHROID, LEVOTHROID) 75 MCG tablet, TAKE ONE TABLET BY MOUTH DAILY, Disp: 90 tablet, Rfl: 1  •  sertraline (ZOLOFT) 100 MG tablet, Take 1 tablet (100 mg total) by mouth Daily, Disp: 30 tablet, Rfl: 3  •  vitamin C (ASCORBIC ACID) 250 MG tablet, Take 250 mg by mouth Daily., Disp: , Rfl:   •  zolpidem (AMBIEN) 5 MG tablet, Take 1 tablet by mouth At Night As Needed for Sleep., Disp: 30 tablet, Rfl: 0     Objective     HPI Comments: She was seen in the urgent treatment Center 7 days ago regarding a acute bacterial rhinosinusitis.  She was given Ceftin 250 one by mouth twice a day.  She is completed 7 days of this antibiotic.  She was given 10 days to take.  She is noticed that she has had some nausea taking Ceftin.  She no longer has any maxillary or frontal sinus symptoms.    She is here to follow-up on hypertension, and hypercholesterolemia.       Review of Systems   Constitutional: Negative.    HENT: Negative.    Respiratory: Negative.    Cardiovascular: Negative.    Gastrointestinal: Positive for nausea.   Musculoskeletal: Negative.    Psychiatric/Behavioral: Negative.        Physical Exam   Constitutional: She is oriented to person, place, and time. She appears well-developed and well-nourished.   HENT:   Head: Normocephalic and atraumatic.   Cardiovascular: Normal rate and regular rhythm.    Pulmonary/Chest: Effort normal.   Neurological: She is alert and oriented to person, place, and time.   Psychiatric: She has a normal mood and affect. Her behavior is normal.   Nursing note and vitals reviewed.        Problem List Items Addressed This Visit        Cardiovascular and Mediastinum    Hypertension - Primary    Hypercholesterolemia            PLAN  Her hypertension is relatively well-controlled.    Her hypercholesterolemia is well-controlled.    She has  a little bit of nausea which I think is probably attributable to her Ceftin.  Her acute rhinosinusitis appears to resolve.  I asked her to discard her remaining 3 days of Ceftin.    She will let me know if her nausea fails to resolve.    She has hypothyroidism.  A like to have her back in about 3 months.  She should have following labs prior to that visit: TSH and free T4, lipid profile, comprehensive metabolic panel.  Return in about 3 months (around 6/9/2018).

## 2018-03-15 ENCOUNTER — TELEPHONE (OUTPATIENT)
Dept: FAMILY MEDICINE CLINIC | Facility: CLINIC | Age: 78
End: 2018-03-15

## 2018-03-15 NOTE — TELEPHONE ENCOUNTER
CALLED AND LM AND LET AUGUSTA KNOW, ORDER WAS PUT IN ON 03/06/18 AND THAT PT HASN'T HAD IT DONE YET. IT WAS FOR A DEXA.   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

## 2018-03-27 ENCOUNTER — OFFICE VISIT (OUTPATIENT)
Dept: FAMILY MEDICINE CLINIC | Facility: CLINIC | Age: 78
End: 2018-03-27

## 2018-03-27 VITALS
DIASTOLIC BLOOD PRESSURE: 82 MMHG | BODY MASS INDEX: 24.48 KG/M2 | RESPIRATION RATE: 15 BRPM | HEIGHT: 62 IN | WEIGHT: 133 LBS | SYSTOLIC BLOOD PRESSURE: 120 MMHG | OXYGEN SATURATION: 98 % | TEMPERATURE: 97.8 F | HEART RATE: 66 BPM

## 2018-03-27 DIAGNOSIS — H93.13 TINNITUS OF BOTH EARS: ICD-10-CM

## 2018-03-27 DIAGNOSIS — M54.50 ACUTE RIGHT-SIDED LOW BACK PAIN WITHOUT SCIATICA: ICD-10-CM

## 2018-03-27 DIAGNOSIS — J32.9 CHRONIC RECURRENT SINUSITIS: Primary | ICD-10-CM

## 2018-03-27 PROCEDURE — 99214 OFFICE O/P EST MOD 30 MIN: CPT | Performed by: NURSE PRACTITIONER

## 2018-03-27 RX ORDER — HYDROCODONE BITARTRATE AND ACETAMINOPHEN 5; 325 MG/1; MG/1
TABLET ORAL
COMMUNITY
Start: 2018-01-05 | End: 2018-03-27

## 2018-03-27 RX ORDER — OXYCODONE HYDROCHLORIDE 5 MG/1
TABLET ORAL
COMMUNITY
Start: 2018-01-09 | End: 2018-03-27

## 2018-03-27 RX ORDER — METHYLPREDNISOLONE 4 MG/1
TABLET ORAL
Qty: 21 TABLET | Refills: 0 | Status: SHIPPED | OUTPATIENT
Start: 2018-03-27 | End: 2020-10-21

## 2018-03-27 NOTE — PROGRESS NOTES
Subjective   Sheila Burleson is a 77 y.o. female.   Chief Complaint   Patient presents with   • Back Pain     lower for 4 days   • Dizziness     1/1/2 weeks   • sinus pressure     Vitals:    03/27/18 1439   BP: 120/82   Pulse: 66   Resp: 15   Temp: 97.8 °F (36.6 °C)   SpO2: 98%     No LMP recorded. Patient is postmenopausal.    History of Present Illness  Sheila is a patient of Dr Barnhart who is here for an acute visit. She c/o bilateral maxillary sinus pressure. She was on ceftin for 10 days on 3/5 but only finished a week.  Dr Barnhart saw her on 3/9 and told her to take 7 days because she had nausea and her symptoms had resolved. She continued to have sinus pressure, dizziness, ear fullness, tinnitus.   She also c/o cough. She has a history of allergic rhinitis and takes allergy injections. She has had chronic sinus issues on and off and feels like she never really improves after treatment.   She also has had some right low back pain that is mild and does not radiate. She denies any known injury. She has had some increased urinary frequency and strong smelling urine.     The following portions of the patient's history were reviewed and updated as appropriate: allergies, current medications, past family history, past medical history, past social history, past surgical history and problem list.    Review of Systems   Constitutional: Positive for fatigue. Negative for chills, fever and unexpected weight change.   HENT: Positive for congestion, postnasal drip, sinus pressure and tinnitus. Negative for hearing loss.    Respiratory: Positive for cough. Negative for chest tightness, shortness of breath and wheezing.    Cardiovascular: Negative.    Gastrointestinal: Negative.    Genitourinary: Positive for frequency.   Musculoskeletal: Positive for back pain.   Allergic/Immunologic: Positive for environmental allergies.       Objective   Physical Exam   Constitutional: She is oriented to person, place, and time. Vital signs are  normal. She appears well-developed and well-nourished. She does not appear ill. No distress.   HENT:   Right Ear: Tympanic membrane and ear canal normal.   Left Ear: Ear canal normal. A middle ear effusion is present.   Nose: Mucosal edema and rhinorrhea present. Right sinus exhibits maxillary sinus tenderness. Left sinus exhibits maxillary sinus tenderness.   Mouth/Throat: Uvula is midline, oropharynx is clear and moist and mucous membranes are normal.   Cardiovascular: Normal rate and normal heart sounds.    Pulmonary/Chest: Effort normal and breath sounds normal.   Neurological: She is alert and oriented to person, place, and time.   Skin: Skin is warm and dry.   Psychiatric: She has a normal mood and affect.       Assessment/Plan   Sheila was seen today for back pain, dizziness and sinus pressure.    Diagnoses and all orders for this visit:    Chronic recurrent sinusitis  -     Ambulatory Referral to ENT (Otolaryngology)    Tinnitus of both ears  -     Ambulatory Referral to ENT (Otolaryngology)    Acute right-sided low back pain without sciatica  -     Urinalysis With / Culture If Indicated - Urine, Clean Catch    Other orders  -     MethylPREDNISolone (MEDROL, ALLY,) 4 MG tablet; Take as directed on package instructions.      Will refer to ENT for chronic sinus issues  Continue current medications and start medrol  Will check a UA to r/o UTI  Heating pad or tylenol for low back pain, avoid heavy lifting   Follow up if your symptoms persist worsen, or if new symptoms develop

## 2018-03-28 ENCOUNTER — TELEPHONE (OUTPATIENT)
Dept: FAMILY MEDICINE CLINIC | Facility: CLINIC | Age: 78
End: 2018-03-28

## 2018-03-28 LAB
APPEARANCE UR: CLEAR
BACTERIA #/AREA URNS HPF: ABNORMAL /HPF
BILIRUB UR QL STRIP: NEGATIVE
COLOR UR: YELLOW
CRYSTALS URNS MICRO: ABNORMAL
EPI CELLS #/AREA URNS HPF: ABNORMAL /HPF
GLUCOSE UR QL: NEGATIVE
HGB UR QL STRIP: NEGATIVE
KETONES UR QL STRIP: NEGATIVE
LEUKOCYTE ESTERASE UR QL STRIP: NEGATIVE
MICRO URNS: NORMAL
MICRO URNS: NORMAL
MUCOUS THREADS URNS QL MICRO: PRESENT /HPF
NITRITE UR QL STRIP: NEGATIVE
PH UR STRIP: 5 [PH] (ref 5–7.5)
PROT UR QL STRIP: NEGATIVE
RBC #/AREA URNS HPF: ABNORMAL /HPF
SP GR UR: 1.02 (ref 1–1.03)
UNIDENT CRYS URNS QL MICRO: PRESENT /LPF
URINALYSIS REFLEX: NORMAL
UROBILINOGEN UR STRIP-MCNC: 0.2 MG/DL (ref 0.2–1)
WBC #/AREA URNS HPF: ABNORMAL /HPF

## 2018-03-28 RX ORDER — ZOLPIDEM TARTRATE 5 MG/1
TABLET ORAL
Qty: 30 TABLET | Refills: 4 | Status: CANCELLED | OUTPATIENT
Start: 2018-03-28

## 2018-03-28 NOTE — TELEPHONE ENCOUNTER
----- patient informed  Results wnl      Message from KAREN Murray sent at 3/28/2018 10:48 AM EDT -----  Please call her and let her know that her urine showed no UTI

## 2018-03-29 RX ORDER — ZOLPIDEM TARTRATE 5 MG/1
5 TABLET ORAL NIGHTLY PRN
Qty: 30 TABLET | Refills: 0 | Status: SHIPPED | OUTPATIENT
Start: 2018-03-29 | End: 2020-10-21 | Stop reason: HOSPADM

## 2018-03-30 ENCOUNTER — TELEPHONE (OUTPATIENT)
Dept: FAMILY MEDICINE CLINIC | Facility: CLINIC | Age: 78
End: 2018-03-30

## 2018-03-30 RX ORDER — ZOLPIDEM TARTRATE 5 MG/1
TABLET ORAL
Qty: 30 TABLET | Refills: 4 | OUTPATIENT
Start: 2018-03-30

## 2018-04-13 ENCOUNTER — OFFICE VISIT (OUTPATIENT)
Dept: SLEEP MEDICINE | Facility: HOSPITAL | Age: 78
End: 2018-04-13
Attending: INTERNAL MEDICINE

## 2018-04-13 VITALS
HEIGHT: 63 IN | HEART RATE: 70 BPM | SYSTOLIC BLOOD PRESSURE: 158 MMHG | WEIGHT: 134 LBS | BODY MASS INDEX: 23.74 KG/M2 | DIASTOLIC BLOOD PRESSURE: 75 MMHG

## 2018-04-13 DIAGNOSIS — G47.33 OBSTRUCTIVE SLEEP APNEA, ADULT: Primary | ICD-10-CM

## 2018-04-13 DIAGNOSIS — G47.61 PERIODIC LIMB MOVEMENT DISORDER (PLMD): ICD-10-CM

## 2018-04-13 DIAGNOSIS — F51.04 PSYCHOPHYSIOLOGICAL INSOMNIA: ICD-10-CM

## 2018-04-13 PROCEDURE — G0463 HOSPITAL OUTPT CLINIC VISIT: HCPCS

## 2018-04-13 NOTE — PROGRESS NOTES
[facility]  [address]  [address]rd [address]  [facility] [address]  [phone]    PCP: Silvano Barnhart MD    Reason for visit:  Sleep disorders: BALBINA    Sheila is a 77 y.o.female who was seen in the [facility] today. The mask headgear is uncomfortable. She sleeps from 11p to 9a. When she has been using the CPAP, she was compliant and has been waking up rested. She broke her wrist and has noticed a difference in sleep quality. She takes Ambien to help her sleep at- 5mg.    [place] Sleepiness Scale is not completed and caffeine intake 1 per day coffee.    Sheila  reports that she does not drink alcohol. 0 per week  Sheila  reports that she has never smoked. She has never used smokeless tobacco.    Current Medications:    Current Outpatient Prescriptions:   •  atorvastatin (LIPITOR) 80 MG tablet, TAKE ONE TABLET BY MOUTH ONCE NIGHTLY, Disp: 30 tablet, Rfl: 11  •  clopidogrel (PLAVIX) 75 MG tablet, TAKE ONE TABLET BY MOUTH DAILY, Disp: 30 tablet, Rfl: 11  •  dicyclomine (BENTYL) 20 MG tablet, Take 1 tablet by mouth Every 6 (Six) Hours., Disp: 40 tablet, Rfl: 1  •  Ergocalciferol (VITAMIN D2) 2000 UNITS tablet, Take by mouth daily., Disp: , Rfl:   •  ezetimibe (ZETIA) 10 MG tablet, Take 1 tablet by mouth Daily., Disp: 30 tablet, Rfl: 11  •  levothyroxine (SYNTHROID, LEVOTHROID) 75 MCG tablet, TAKE ONE TABLET BY MOUTH DAILY, Disp: 90 tablet, Rfl: 1  •  MethylPREDNISolone (MEDROL, ALLY,) 4 MG tablet, Take as directed on package instructions., Disp: 21 tablet, Rfl: 0  •  sertraline (ZOLOFT) 100 MG tablet, Take 1 tablet (100 mg total) by mouth Daily, Disp: 30 tablet, Rfl: 3  •  vitamin C (ASCORBIC ACID) 250 MG tablet, Take 250 mg by mouth Daily., Disp: , Rfl:   •  zolpidem (AMBIEN) 5 MG tablet, Take 1 tablet by mouth At Night As Needed for Sleep., Disp: 30 tablet, Rfl: 0   also entered in Sleep Questionnaire    Patient  has a past medical history of Allergy desensitization therapy; Daytime  "sleepiness; Depression; Diverticulosis; Head trauma; History of colon polyps; Hyperlipidemia; Hypertension; Hypothyroidism; Irritable bowel syndrome; Multilevel degenerative disc disease; BALBINA on CPAP; Osteoarthritis; Sialodocholithiasis; Sleep apnea; Stroke; Thyroid disease; and TIA (transient ischemic attack) (03/2017).     Pertinent Positive Review of Systems of nasal congestion, cough  Rest of Review of Systems was negative as recorded in Sleep Questionnaire.         Vital Signs: /75   Pulse 70   Ht 158.8 cm (62.5\")   Wt 60.8 kg (134 lb)   BMI 24.12 kg/m²         General: Alert. Cooperative. Well developed. No acute distress.             Head:  Normocephalic. Symmetrical. Atraumatic.              Nose: No septal deviation. No drainage.          Throat: No oral lesions. No thrush. Moist mucous membranes.    Tongue is large and dentition is few upper dentures       Pharynx: Posterior pharyngeal pillars are wide with Mallampati score of II     Chest Wall:  Normal shape. Symmetric expansion with respiration. No tenderness.             Neck:  Trachea midline.           Lungs:  Clear to auscultation bilaterally. No wheezes. No rhonchi. No rales. Respirations regular, even and unlabored.            Heart:  Regular rhythm and normal rate. Normal S1 and S2. No murmur.     Abdomen:  Soft, non-tender and non-distended. Normal bowel sounds. No masses.  Extremities:  Moves all extremities well. No edema.    Psychiatric: Normal mood and affect.    Study:  5/4/17- Overnight split polysomnogram study.  Diagnostic study from 11:05 PM to 20 2 AM.  Sleep efficiency 74% with 2.43 hours total sleep time.  Sleep distribution shows absence of slow-wave sleep and REM sleep.  AHI index overall was 19.7 with a respiratory disturbance index of 41.5.  In supine position AHI is 63 with RDI of 82.  Absence of REM sleep severity may be underestimated.  Oxygen saturation remained above 89%.  Arousal index 59.  PLM index 140.  PLM with " arousal index 26.  Snoring for 5.5% of total sleep time.    Titration study from 2:22 AM to 5:59 AM.  Sleep efficiency was much poor at 45% on the titration portion.  There was again no REM sleep on the titration study.  PLM index was elevated at 38 with a PLM arousal index of 4.9 on the titration study.  CPAP was increased up to 17 cm water pressure.  Maximum sleep achieved at 15 cm though even then an ideal pressure could not be found.  ·      Testing:  · 12/5/17-3/4/18, 35% compliance with average nightly use of 6 hours and 57 min on auto CPAP 10-20cm with avg pressure 11.6, AHI 2.6    DME Company: [place]    Impression:  1. Obstructive sleep apnea, adult    2. Periodic limb movement disorder (PLMD)    3. Psychophysiological insomnia         Plan:  Sheila was asked to increase CPAP compliance in view of prior hx of TIA/stroke. I will change the pressures to auto 8-12cm. She will be fitted with a different mask.   She is on Ambien to assist with insomnia-and it is controlled.    I reiterated the importance of effective treatment of obstructive sleep apnea with PAP machine.  Cardiovascular health risks of untreated sleep apnea were again reviewed.  Patient was asked to remain cautious if there is persistent hypersomnolence. The benefit of weight loss in reducing severity of obstructive sleep apnea was discussed.  Patient would benefit from adhering to a strict diet to achieve ideal BMI.     Change of PAP supplies regularly is important for effective use.  Change of cushion on the mask or plugs on nasal pillows along with disposable filters once every month and change of mask frame, tubing, headgear and Velcro straps every 6 months at the minimum was reiterated.    This patient is compliant with PAP machine and benefits from its use.  Apnea hypopneas index is corrected/improved.  Daytime hypersomnolence has resolved.     Patient will follow up in this clinic in 3 months      Thank you for allowing me to participate in  your patient's care.    Adrián Samuel MD    Part of this note may be an electronic transcription/translation of spoken language to printed text using the Dragon Dictation System.

## 2018-04-24 RX ORDER — SERTRALINE HYDROCHLORIDE 100 MG/1
TABLET, FILM COATED ORAL
Qty: 30 TABLET | Refills: 2 | Status: SHIPPED | OUTPATIENT
Start: 2018-04-24 | End: 2022-03-17 | Stop reason: ALTCHOICE

## 2018-05-10 DIAGNOSIS — R92.2 DENSE BREASTS: Primary | ICD-10-CM

## 2018-05-14 ENCOUNTER — OFFICE VISIT (OUTPATIENT)
Dept: NEUROLOGY | Facility: CLINIC | Age: 78
End: 2018-05-14

## 2018-05-14 VITALS
SYSTOLIC BLOOD PRESSURE: 128 MMHG | HEIGHT: 63 IN | OXYGEN SATURATION: 98 % | WEIGHT: 137 LBS | HEART RATE: 67 BPM | BODY MASS INDEX: 24.27 KG/M2 | DIASTOLIC BLOOD PRESSURE: 74 MMHG

## 2018-05-14 DIAGNOSIS — E78.00 HYPERCHOLESTEROLEMIA: ICD-10-CM

## 2018-05-14 DIAGNOSIS — R92.2 DENSE BREAST: Primary | ICD-10-CM

## 2018-05-14 DIAGNOSIS — I10 ESSENTIAL HYPERTENSION: ICD-10-CM

## 2018-05-14 DIAGNOSIS — Z86.73 HISTORY OF TIA (TRANSIENT ISCHEMIC ATTACK): ICD-10-CM

## 2018-05-14 PROCEDURE — 99212 OFFICE O/P EST SF 10 MIN: CPT | Performed by: NURSE PRACTITIONER

## 2018-05-14 NOTE — PROGRESS NOTES
"DOS: 2018  NAME: Sehila Burleson   : 1940  PCP: Silvano Barnhart MD    Chief Complaint   Patient presents with   • Cerebrovascular Accident        Neurological Problem and Interval History:  77 y.o. RHW female with HTN, HLD and TIA (, ). She presents today for annual TIA follow up.     She denies any S/S of stroke. She did fall once when she slipped going down the stairs and broke her wrist. She gets these sharp pains on top of her head sometimes but they are short lived. Her BP is usually good. She is on Lipitor and zetia, she would like to come off of one and she is not sure why she is on both. She is tolerating her Plavix. She denies any palpations. She worse the zio patch and it did not show atrial fib.     2017 She presented to [facility] with double vision, horizontal, and balance trouble. They started her on meclizine and D/C her home. Her symptoms lasted less than 24 hours. She went to bed that night and thought \"oh good it's gone\" but when she woke up the next day it reoccurred and she came back to [facility] and was admitted. She was on ASA at the time of the event. She had 3 MRIs, all negative for stroke, but she was Dx with a brainstem stroke, ASA switched to Plavix and lipitor started. Again, the double vision did not last more than 24 hours and resolved completely. She took the ASA because of an episode she had 10 years ago. At that she developed double vision, went to [facility] and Dx with TIA.     Review of Systems:        Review of Systems   Constitutional: Positive for fatigue. Negative for chills and fever.   HENT: Positive for hearing loss, tinnitus (sometimes) and trouble swallowing.    Eyes: Negative for pain, redness, itching and visual disturbance.   Respiratory: Negative for cough, chest tightness, shortness of breath and wheezing.    Cardiovascular: Negative for chest pain and palpitations.   Gastrointestinal: Positive for constipation. Negative for diarrhea, nausea and vomiting. "   Endocrine: Negative for cold intolerance, heat intolerance and polydipsia.   Genitourinary: Negative for decreased urine volume, difficulty urinating, frequency and urgency.   Musculoskeletal: Positive for back pain and joint swelling (knees). Negative for gait problem, neck pain and neck stiffness.   Skin: Negative for color change, rash and wound.   Allergic/Immunologic: Negative for environmental allergies, food allergies and immunocompromised state.   Neurological: Positive for weakness (all over) and headaches (pains in top of the head, but they do go away). Negative for dizziness, tremors, seizures, syncope, facial asymmetry, speech difficulty, light-headedness and numbness.   Hematological: Negative for adenopathy. Bruises/bleeds easily.   Psychiatric/Behavioral: Positive for decreased concentration (finding words) and sleep disturbance. Negative for agitation, behavioral problems, confusion, dysphoric mood, hallucinations, self-injury and suicidal ideas. The patient is nervous/anxious. The patient is not hyperactive.        Current Outpatient Prescriptions on File Prior to Visit   Medication Sig Dispense Refill   • atorvastatin (LIPITOR) 80 MG tablet TAKE ONE TABLET BY MOUTH ONCE NIGHTLY 30 tablet 11   • clopidogrel (PLAVIX) 75 MG tablet TAKE ONE TABLET BY MOUTH DAILY 30 tablet 11   • Ergocalciferol (VITAMIN D2) 2000 UNITS tablet Take by mouth daily.     • ezetimibe (ZETIA) 10 MG tablet Take 1 tablet by mouth Daily. 30 tablet 11   • levothyroxine (SYNTHROID, LEVOTHROID) 75 MCG tablet TAKE ONE TABLET BY MOUTH DAILY 90 tablet 1   • sertraline (ZOLOFT) 100 MG tablet TAKE ONE TABLET BY MOUTH DAILY 30 tablet 2   • vitamin C (ASCORBIC ACID) 250 MG tablet Take 250 mg by mouth Daily.     • zolpidem (AMBIEN) 5 MG tablet Take 1 tablet by mouth At Night As Needed for Sleep. 30 tablet 0   • dicyclomine (BENTYL) 20 MG tablet Take 1 tablet by mouth Every 6 (Six) Hours. 40 tablet 1   • MethylPREDNISolone (MEDROL, ALLY,) 4  "MG tablet Take as directed on package instructions. 21 tablet 0     No current facility-administered medications on file prior to visit.       \"The following portions of the patient's history were reviewed and updated as appropriate: allergies, current medications, past family history, past medical history, past social history, past surgical history and problem list.\"  Review of Imagin2017 30 day monitor per Dr. Argueta   Event monitor worn for 30 days.  5 patient triggered episodes.  Palpitations reported on one of the episodes with frequent monomorphic PVCs and a short duration of NSVT.  Ventricular trigeminy also noted.  Asymptomatic during that time.    Laboratory Results:             Lab Results   Component Value Date    HGBA1C 5.23 2017         Lab Results   Component Value Date    CHOL 229 (H) 2017         Lab Results   Component Value Date    HDL 51 2018    HDL 53 2017    HDL 45 05/10/2017         Lab Results   Component Value Date    LDL 36 2018    LDL 36 2017    LDL 43 05/10/2017         Lab Results   Component Value Date    TRIG 79 2018    TRIG 59 2017    TRIG 128 05/10/2017     No results found for: RPR  Lab Results   Component Value Date    TSH 0.685 2017     Lab Results   Component Value Date    MKANGZSV60 991 (H) 2017       Physical Examination:   mRS: 0  General Appearance:   Well developed, well nourished, well groomed, alert, and cooperative.  HEENT: Normocephalic.    Neck and Spine: No bruits.  Cardiac: Regular rate and rhythm. No murmurs.  Extremities:    No edema or deformities.   Neurological examination:  Higher Integrative  Function: Oriented to time, place and person. Normal registration, attention span and concentration. Normal language including comprehension, spontaneous speech, repetition, naming and vocabulary. No neglect with normal visual-spatial function and construction. Normal fund of knowledge and higher integrative " function.  CN II: Pupils are equal, round, and reactive to light. Normal visual acuity and visual fields.    CN III IV VI: Extraocular movements are full without nystagmus.   CN V: Normal facial sensation and strength of muscles of mastication.  CN VII: Facial movements are symmetric. No weakness.  CN VIII:   Auditory acuity is normal.  CN IX & X:   Symmetric palatal movement.  CN XI: Sternocleidomastoid and trapezius are normal.  No weakness.  CN XII:   The tongue is midline.  No atrophy or fasciculations.  Motor: Normal muscle strength, bulk and tone in upper and lower extremities.  No fasciculations, rigidity, spasticity, or abnormal movements.  Sensation: Normal to light touch, temperature, and proprioception in arms and legs.   Station and Gait: Normal gait and station.    Coordination: Rapid alternating movements are normal.     Diagnoses / Discussion:  Ms. Burleson is a 75yo who presented today for her annual TIA follow from 2/2017. She has been event free for over a year, on Plavix. Her symptom included horizontal diplopia and balance trouble. Imaging at the time of the events, all 3 MRIs, were negative for any acute abnormalities or stroke. She had two events with in two days, the exact same symptoms, both lasting <24 hours. She is being treated as if the events were TIAs, etiology unknown. The MRA did not show any flow limiting stenosis that would explain her recurrent symptoms. She had an event monitor which was negative for atrial fib and she denies any cardiac symptoms at this time. We recommend continuing Plavix for secondary TIA/stroke prevention. She needs continued risk factor control. Her LDL was elevated in the 190s in October 2016 and 140's at the time of the event. Her most recent LDL was in the 30s which has increased risk of ICH. I recommend Lipitor 20mg or 40mg for goal LDL <100. I encouraged her to discuss this and the Zetia with Dr. Barnhart as he is who is managing her medications. Continue to  monitor BP regularly and FU with Dr. Barnhart is BP consistently >130/85 for better BP control. She and her daughter agree with the plan and will call with any questions or concerns.      Plan:   Continue Plavix    Blood pressure control to <130/80   Goal LDL 53-41-[id number] statins-  consider Lipitor 20mg or 40mg    Serum glucose < 140   Call 911 for stroke any stroke symptoms   Follow-up 1 year   Sheila was seen today for cerebrovascular accident.    Diagnoses and all orders for this visit:    History of TIA (transient ischemic attack)    Essential hypertension    Hypercholesterolemia        Coding

## 2018-05-15 ENCOUNTER — TELEPHONE (OUTPATIENT)
Dept: FAMILY MEDICINE CLINIC | Facility: CLINIC | Age: 78
End: 2018-05-15

## 2018-05-15 NOTE — TELEPHONE ENCOUNTER
MESSAGE RELAYED TO DR. WISEMAN    ----- Message from Lyla Mariscal sent at 5/15/2018  8:27 AM EDT -----  EMERY LELA     PT OB SET HER UP FOR HER FU DIAGNOSTIC MAMMAGRAM AT THE [facility]

## 2018-05-29 ENCOUNTER — TELEPHONE (OUTPATIENT)
Dept: FAMILY MEDICINE CLINIC | Facility: CLINIC | Age: 78
End: 2018-05-29

## 2018-05-29 NOTE — TELEPHONE ENCOUNTER
CALLED PT AND ADVISED HE AN ORDER HAD BEEN PUT IN BACK IN MARCH FOR HER TO GET A DEXA DONE. PT UNDERSTOOD AND STATED THEY CONTACTED HER LAST MONTH TO DO IT, BUT BECAUSE SHE HAD A LOT GOING ON THEY STATED THEY WOULD CALL HER THIS MONTH AND SCHEDULE. I GAVE HER THE NUMBER TO CALL AND SCHEDULE. PT UNDERSTOOD AND HAS NOT YET SCHEDULED.   ----- Message from Lyla Mariscal sent at 5/29/2018  8:36 AM EDT -----  Contact: CYNDIE WITH [facility] CALLING WANTING TO KNOW IF PT HAS HAD A BONE DENSITY AFTER DEC 2017    IF THEY HAVE ONE PLEASE SEND COPY [phone]  CYNDIE       IF NOT THEY ARE NEEDING ONE ORDERED     PLEASE REACH OUT TO PT AND SCHEDULE ONE WITH HER WITH IN THE NEXT 29 DAYS

## 2018-05-30 DIAGNOSIS — Z78.0 POST-MENOPAUSAL: Primary | ICD-10-CM

## 2018-05-30 DIAGNOSIS — S52.532A CLOSED COLLES' FRACTURE OF LEFT RADIUS, INITIAL ENCOUNTER: ICD-10-CM

## 2018-06-07 DIAGNOSIS — E78.5 HYPERLIPIDEMIA, UNSPECIFIED HYPERLIPIDEMIA TYPE: ICD-10-CM

## 2018-06-07 DIAGNOSIS — Z79.899 LONG TERM USE OF DRUG: Primary | ICD-10-CM

## 2018-06-07 DIAGNOSIS — E03.9 HYPOTHYROIDISM, UNSPECIFIED TYPE: ICD-10-CM

## 2018-06-13 ENCOUNTER — HOSPITAL ENCOUNTER (OUTPATIENT)
Dept: BONE DENSITY | Facility: HOSPITAL | Age: 78
Discharge: HOME | End: 2018-06-13
Admitting: INTERNAL MEDICINE

## 2018-06-13 PROCEDURE — 77080 DXA BONE DENSITY AXIAL: CPT

## 2018-06-22 ENCOUNTER — TELEPHONE (OUTPATIENT)
Dept: SLEEP MEDICINE | Facility: HOSPITAL | Age: 78
End: 2018-06-22

## 2018-06-22 NOTE — TELEPHONE ENCOUNTER
Patient called and stated that pressures were too high.  CPAP is burning nostrils.  Wants to know can it be turned down

## 2018-06-22 NOTE — TELEPHONE ENCOUNTER
Called patient to let her know that KAREN Soto decreased pressures from 10-20 cm H20 to 7-10 cm H20 remotely

## 2018-07-06 ENCOUNTER — OFFICE VISIT (OUTPATIENT)
Dept: SLEEP MEDICINE | Facility: HOSPITAL | Age: 78
End: 2018-07-06
Attending: INTERNAL MEDICINE

## 2018-07-06 VITALS
HEIGHT: 63 IN | WEIGHT: 134.4 LBS | HEART RATE: 79 BPM | SYSTOLIC BLOOD PRESSURE: 118 MMHG | OXYGEN SATURATION: 96 % | DIASTOLIC BLOOD PRESSURE: 74 MMHG | BODY MASS INDEX: 23.81 KG/M2

## 2018-07-06 DIAGNOSIS — F45.8 AEROPHAGIA: ICD-10-CM

## 2018-07-06 DIAGNOSIS — G47.00 INSOMNIA, UNSPECIFIED TYPE: ICD-10-CM

## 2018-07-06 DIAGNOSIS — R68.2 DRY MOUTH: ICD-10-CM

## 2018-07-06 DIAGNOSIS — G47.33 OBSTRUCTIVE SLEEP APNEA: Primary | ICD-10-CM

## 2018-07-06 PROCEDURE — G0463 HOSPITAL OUTPT CLINIC VISIT: HCPCS

## 2018-07-06 NOTE — PROGRESS NOTES
Pressure change per Dr. Samuel's orders. Previous Auto CPAP settings 7cm-10cm; pressures changed to Auto CPAP 5cm-10cm H2O via modem.  Pt's [facility]. yudith

## 2018-07-06 NOTE — PROGRESS NOTES
[facility] SLEEP MEDICINE  [address]  3rd Floor  [facility] [address]  [phone]    PCP: Silvano Barnhart MD    Reason for visit:  Sleep disorders: BALBINA    Sheila is a 77 y.o.female who was seen in the Sleep Disorders Center today. She finds the pressure too high. She sleeps from 11p to 8a. She uses ffm. Has upper dentures. Mask does not leak much. Very dry mouth. She has a chin strap but unable to use. Also bloating. She does not sleep on her back much.  She remains on Ambien through her PCP.  [place] Sleepiness Scale is 1. Caffeine 1 per day. Alcohol 0 per week.    Sheila  reports that she has never smoked. She has never used smokeless tobacco.    Pertinent Positive Review of Systems of nasal congestion, cough, abdominal bloating  Rest of Review of Systems was negative as recorded in Sleep Questionnaire.    Patient  has a past medical history of Allergy desensitization therapy; Daytime sleepiness; Depression; Diverticulosis; Head trauma; History of colon polyps; Hyperlipidemia; Hypertension; Hypothyroidism; Irritable bowel syndrome; Multilevel degenerative disc disease; BALBINA on CPAP; Osteoarthritis; Sialodocholithiasis; Sleep apnea; Stroke (CMS/HCC); Thyroid disease; and TIA (transient ischemic attack) (03/2017).     Current Medications:    Current Outpatient Prescriptions:   •  atorvastatin (LIPITOR) 80 MG tablet, TAKE ONE TABLET BY MOUTH ONCE NIGHTLY, Disp: 30 tablet, Rfl: 11  •  clopidogrel (PLAVIX) 75 MG tablet, TAKE ONE TABLET BY MOUTH DAILY, Disp: 30 tablet, Rfl: 11  •  dicyclomine (BENTYL) 20 MG tablet, Take 1 tablet by mouth Every 6 (Six) Hours., Disp: 40 tablet, Rfl: 1  •  Ergocalciferol (VITAMIN D2) 2000 UNITS tablet, Take by mouth daily., Disp: , Rfl:   •  ezetimibe (ZETIA) 10 MG tablet, Take 1 tablet by mouth Daily., Disp: 30 tablet, Rfl: 11  •  levothyroxine (SYNTHROID, LEVOTHROID) 75 MCG tablet, TAKE ONE TABLET BY MOUTH DAILY, Disp: 90 tablet, Rfl: 1  •  MethylPREDNISolone (MEDROL,  "ALLY,) 4 MG tablet, Take as directed on package instructions., Disp: 21 tablet, Rfl: 0  •  sertraline (ZOLOFT) 100 MG tablet, TAKE ONE TABLET BY MOUTH DAILY, Disp: 30 tablet, Rfl: 2  •  vitamin C (ASCORBIC ACID) 250 MG tablet, Take 250 mg by mouth Daily., Disp: , Rfl:   •  zolpidem (AMBIEN) 5 MG tablet, Take 1 tablet by mouth At Night As Needed for Sleep., Disp: 30 tablet, Rfl: 0   also entered in Sleep Questionnaire         Vital Signs: /74 (BP Location: Left arm, Patient Position: Sitting)   Pulse 79   Ht 158.8 cm (62.5\")   Wt 61 kg (134 lb 6.4 oz)   SpO2 96%   BMI 24.19 kg/m²     Body mass index is 24.19 kg/m².       Tongue: large      Dentition: upper dentures       Pharynx: Posterior pharyngeal pillars are narrow   Mallampatti: II (hard and soft palate, upper portion of tonsils anduvula visible)        General: Alert. Cooperative. Well developed. No acute distress.             Head:  Normocephalic. Symmetrical. Atraumatic.              Nose: No septal deviation. No drainage.          Throat: No oral lesions. No thrush. Moist mucous membranes.    Chest Wall:  Normal shape. Symmetric expansion with respiration. No tenderness.             Neck:  Trachea midline.           Lungs:  Clear to auscultation bilaterally. No wheezes. No rhonchi. No rales. Respirations regular, even and unlabored.            Heart:  Regular rhythm and normal rate. Normal S1 and S2. No murmur.     Abdomen:  Soft, non-tender and non-distended. Normal bowel sounds. No masses.  Extremities:  Moves all extremities well. No edema.    Psychiatric: Normal mood and affect.    Study:  5/4/17- Overnight split polysomnogram study.  Diagnostic study from 11:05 PM to 20 2 AM.  Sleep efficiency 74% with 2.43 hours total sleep time.  Sleep distribution shows absence of slow-wave sleep and REM sleep.  AHI index overall was 19.7 with a respiratory disturbance index of 41.5.  In supine position AHI is 63 with RDI of 82.  Absence of REM sleep " severity may be underestimated.  Oxygen saturation remained above 89%.  Arousal index 59.  PLM index 140.  PLM with arousal index 26.  Snoring for 5.5% of total sleep time.     Titration study from 2:22 AM to 5:59 AM.  Sleep efficiency was much poor at 45% on the titration portion.  There was again no REM sleep on the titration study.  PLM index was elevated at 38 with a PLM arousal index of 4.9 on the titration study.  CPAP was increased up to 17 cm water pressure.  Maximum sleep achieved at 15 cm though even then an ideal pressure could not be found.    Testing:  · For the last 90 days average compliance only 16%.  Usage on days used was 3 hours.  Average CPAP pressure is 8-9 cm.  AHI 23.5.  Average CPAP pressure 7-10 cm.  Due to poor compliance download data is not entirely reliable.    [employer]: [employer]    Impression:  1. Obstructive sleep apnea    2. Dry mouth    3. Aerophagia    4. Insomnia, unspecified type        Plan:  Sheila cannot tolerate even auto CPAP 7-10 cm.  She gets too much aerophagia.  I will therefore switch her to an auto CPAP 5-10 cm.  As noted above on her polysomnogram she has primarily supine related obstructive sleep apnea.  She was asked to avoid supine position sleep.  We discussed using a rematee belt.  She has dentures so no OMAD.  Hopefully reducing the CPAP pressures will prevent further dry mouth.    I reiterated the importance of effective treatment of obstructive sleep apnea with PAP machine.  Cardiovascular health risks of untreated sleep apnea were again reviewed.  Patient was asked to remain cautious if there is persistent hypersomnolence. The benefit of weight loss in reducing severity of obstructive sleep apnea was discussed.  Patient would benefit from adhering to a strict diet to achieve ideal BMI.     Change of PAP supplies regularly is important for effective use.  Change of cushion on the mask or plugs on nasal pillows along with disposable filters once every month and  change of mask frame, tubing, headgear and Velcro straps every 6 months at the minimum was reiterated.    Patient will follow up in this clinic in 3 months  APRN    Thank you for allowing me to participate in your patient's care.    Adrián Samuel MD    Part of this note may be an electronic transcription/translation of spoken language to printed text using the Dragon Dictation System.

## 2018-07-30 RX ORDER — SERTRALINE HYDROCHLORIDE 100 MG/1
TABLET, FILM COATED ORAL
Qty: 30 TABLET | Refills: 1 | OUTPATIENT
Start: 2018-07-30

## 2018-08-29 RX ORDER — LEVOTHYROXINE SODIUM 0.07 MG/1
TABLET ORAL
Qty: 90 TABLET | Refills: 0 | Status: SHIPPED | OUTPATIENT
Start: 2018-08-29

## 2018-09-11 ENCOUNTER — HOSPITAL ENCOUNTER (OUTPATIENT)
Dept: GENERAL RADIOLOGY | Facility: HOSPITAL | Age: 78
Discharge: HOME | End: 2018-09-11
Admitting: INTERNAL MEDICINE

## 2018-09-11 DIAGNOSIS — R05.9 COUGH: ICD-10-CM

## 2018-09-11 PROCEDURE — 71046 X-RAY EXAM CHEST 2 VIEWS: CPT

## 2018-09-13 ENCOUNTER — TELEPHONE (OUTPATIENT)
Dept: SLEEP MEDICINE | Facility: HOSPITAL | Age: 78
End: 2018-09-13

## 2018-09-13 NOTE — TELEPHONE ENCOUNTER
Patient called today to report that she is having a persistent dry cough x 4 weeks and believes her CPAP therapy may be causing this cough which awakens her frequently during her sleep period.  Pt had recently requested significant pressure reduction.  Pt's AHI is higher than 5/hr post pressure reduction.  Pt is wearing Dream Wear FFM and is adamant that she is not opening mouth past mask borders.  Tech suggested that her appt. For follow up with sleep clinician be moved up from mid October to first available.  Pt wanted to check her schedule and will CB to try to move up appt. yudith

## 2018-09-26 ENCOUNTER — APPOINTMENT (OUTPATIENT)
Dept: SLEEP MEDICINE | Facility: HOSPITAL | Age: 78
End: 2018-09-26

## 2018-10-03 ENCOUNTER — OFFICE VISIT (OUTPATIENT)
Dept: SLEEP MEDICINE | Facility: HOSPITAL | Age: 78
End: 2018-10-03

## 2018-10-03 VITALS
HEIGHT: 63 IN | WEIGHT: 134.2 LBS | DIASTOLIC BLOOD PRESSURE: 76 MMHG | OXYGEN SATURATION: 97 % | HEART RATE: 70 BPM | SYSTOLIC BLOOD PRESSURE: 139 MMHG | BODY MASS INDEX: 23.78 KG/M2

## 2018-10-03 DIAGNOSIS — F45.8 AEROPHAGIA: ICD-10-CM

## 2018-10-03 DIAGNOSIS — G47.33 OBSTRUCTIVE SLEEP APNEA: Primary | ICD-10-CM

## 2018-10-03 DIAGNOSIS — R68.2 DRY MOUTH: ICD-10-CM

## 2018-10-03 PROCEDURE — G0463 HOSPITAL OUTPT CLINIC VISIT: HCPCS

## 2018-10-03 NOTE — PROGRESS NOTES
[facility]  Telephone: [phone] / Fax: [phone] [place]  Telephone: [phone] / Fax: [phone] Arlette Vasquez    PCP: Pallares, Clara Ann, MD    Reason for visit: BALBINA f/u    Sheila Burleson is a 78 y.o.female  was last seen at [facility] sleep lab in July 2018. She is here for CPAP intolerance. She reports dry mouth. Her mask leaks around the eyes. Her left eye hooks. She cannot tell if the machine is helping her. She is miserable. She has significant AR and uses nasal spray and neti pot. She is asking if there are any mask alternatives to help improve compliance.  Her sleep schedule is 11 PM-8 AM.  Her ESS is 1.  She is currently using nasal pillows but they do not fit well.  Her CPAP intolerance intensified in the past 3-6 months.  Prior to that she felt great on the device and has benefited.  She has been on the machine now for about a year.    Social history, no tobacco, drinks few alcoholic beverages a week, occasional caffeine.    Review of systems, positive for nasal congestion, positive post nasal drip, positive cough.    Current Medications:    Current Outpatient Prescriptions:   •  atorvastatin (LIPITOR) 80 MG tablet, TAKE ONE TABLET BY MOUTH ONCE NIGHTLY, Disp: 30 tablet, Rfl: 11  •  clopidogrel (PLAVIX) 75 MG tablet, TAKE ONE TABLET BY MOUTH DAILY, Disp: 30 tablet, Rfl: 11  •  dicyclomine (BENTYL) 20 MG tablet, Take 1 tablet by mouth Every 6 (Six) Hours., Disp: 40 tablet, Rfl: 1  •  Ergocalciferol (VITAMIN D2) 2000 UNITS tablet, Take by mouth daily., Disp: , Rfl:   •  ezetimibe (ZETIA) 10 MG tablet, Take 1 tablet by mouth Daily., Disp: 30 tablet, Rfl: 11  •  levothyroxine (SYNTHROID, LEVOTHROID) 75 MCG tablet, TAKE ONE TABLET BY MOUTH DAILY, Disp: 90 tablet, Rfl: 0  •  MethylPREDNISolone (MEDROL, ALLY,) 4 MG tablet, Take as directed on package instructions., Disp: 21 tablet, Rfl: 0  •  sertraline (ZOLOFT) 100 MG tablet, TAKE ONE TABLET BY MOUTH DAILY, Disp: 30 tablet, Rfl:  "2  •  vitamin C (ASCORBIC ACID) 250 MG tablet, Take 250 mg by mouth Daily., Disp: , Rfl:   •  zolpidem (AMBIEN) 5 MG tablet, Take 1 tablet by mouth At Night As Needed for Sleep., Disp: 30 tablet, Rfl: 0   also entered in Sleep Questionnaire    Patient  has a past medical history of Allergy desensitization therapy; Daytime sleepiness; Depression; Diverticulosis; Head trauma; History of colon polyps; Hyperlipidemia; Hypertension; Hypothyroidism; Irritable bowel syndrome; Multilevel degenerative disc disease; BALBINA on CPAP; Osteoarthritis; Sialodocholithiasis; Sleep apnea; Stroke (CMS/HCC); Thyroid disease; and TIA (transient ischemic attack) (03/2017).    I have reviewed the Past Medical History, Past Surgical History, Social History and Family History.            ESS  1   Vital Signs /76 (BP Location: Left arm, Patient Position: Sitting)   Pulse 70   Ht 160 cm (63\")   Wt 60.9 kg (134 lb 3.2 oz)   SpO2 97%   BMI 23.77 kg/m²  Body mass index is 23.77 kg/m².    General Alert and oriented. No acute distress noted   Pharynx/Throat Class IV Mallampati airway, large tongue, no evidence of redundant lateral pharyngeal tissue. No oral lesions. No thrush. Moist mucous membranes..   Head Normocephalic. Symmetrical. Atraumatic.    Nose No septal deviation. No drainage   Chest Wall Normal shape. Symmetric expansion with respiration. No tenderness.   Neck Trachea midline, no thyromegaly or adenopathy    Lungs Clear to auscultation bilaterally. No wheezes. No rhonchi. No rales. Respirations regular, even and unlabored.   Heart Regular rhythm and normal rate. Normal S1 and S2. No murmur   Abdomen Soft, non-tender and non-distended. Normal bowel sounds. No masses.   Extremities Moves all extremities well. No edema   Psychiatric Normal mood and affect.     Testing:  Download 9/3/18-10/2/18, 56% compliance with average nightly use of 4 hours and 25 minutes on auto CPAP 5-10 centimeters, average pressures of 9.3, AHI " 21.    Results 5/4/17   Overnight split polysomnogram study.  Diagnostic study from 11:05 PM to 20 2 AM.  Sleep efficiency 74% with 2.43 hours total sleep time.  Sleep distribution shows absence of slow-wave sleep and REM sleep.  AHI index overall was 19.7 with a respiratory disturbance index of 41.5.  In supine position AHI is 63 with RDI of 82.  Absence of REM sleep severity may be underestimated.  Oxygen saturation remained above 89%.  Arousal index 59.  PLM index 140.  PLM with arousal index 26.  Snoring for 5.5% of total sleep time.  Titration study from 2:22 AM to 5:59 AM.  Sleep efficiency was much poor at 45% on the titration portion.  There was again no REM sleep on the titration study.  PLM index was elevated at 38 with a PLM arousal index of 4.9 on the titration study.  CPAP was increased up to 17 cm water pressure.  Maximum sleep achieved at 15 cm though even then an ideal pressure could not be found    Impression:  1. Obstructive sleep apnea    2. Dry mouth    3. Aerophagia          Plan:  She was fitted today with Brevida mask and I will order her a  chinstrap.  Her aerophagia improved on CPAP 5-10 centimeters of water and I will keep her on lower pressures to give her some time to adjust. Aware of high AHI. I asked her to avoid supine position sleep. She cannot use a bumper belt. She may consider a tennis ball.  I have emphasized to her the importance of staying on the machine.  We have also discussed inspire device.  She would like to consult with her children about proceeding with ENT consultation for inspire and will let us know.    Changing of PAP supplies regularly is important for effective use. Patient needs to change cushion on the mask or plugs on nasal pillows along with disposable filters once every month and change mask frame, tubing, headgear and Velcro straps every 6 months at the minimum.       Follow up with Dr. Samuel in 4 weeks.    Thank you for allowing me to participate in your  patient's care.      KAREN Soto  [facility]  Phone: [phone]      Part of this note may be an electronic transcription/translation of spoken language to printed text using the Dragon Dictation System.

## 2018-10-31 ENCOUNTER — OFFICE VISIT (OUTPATIENT)
Dept: SLEEP MEDICINE | Facility: HOSPITAL | Age: 78
End: 2018-10-31

## 2018-10-31 VITALS
HEART RATE: 69 BPM | DIASTOLIC BLOOD PRESSURE: 72 MMHG | SYSTOLIC BLOOD PRESSURE: 143 MMHG | WEIGHT: 136.4 LBS | OXYGEN SATURATION: 93 % | BODY MASS INDEX: 24.17 KG/M2 | HEIGHT: 63 IN

## 2018-10-31 DIAGNOSIS — R68.2 DRY MOUTH: ICD-10-CM

## 2018-10-31 DIAGNOSIS — G47.33 OBSTRUCTIVE SLEEP APNEA: Primary | ICD-10-CM

## 2018-10-31 DIAGNOSIS — F45.8 AEROPHAGIA: ICD-10-CM

## 2018-10-31 PROCEDURE — G0463 HOSPITAL OUTPT CLINIC VISIT: HCPCS

## 2018-12-07 RX ORDER — LEVOTHYROXINE SODIUM 0.07 MG/1
TABLET ORAL
Qty: 90 TABLET | Refills: 0 | OUTPATIENT
Start: 2018-12-07

## 2018-12-07 NOTE — TELEPHONE ENCOUNTER
PT IS OVERDUE FOR APPT. HAS CANCELLED SEVERAL. SHE IS DUE FOR FASTING LABS AND F/U SHE WILL HAVE 30 DAY SUPPLY SENT OVER WHEN SHE SCHEDULES APPT.

## 2018-12-10 ENCOUNTER — TELEPHONE (OUTPATIENT)
Dept: FAMILY MEDICINE CLINIC | Facility: CLINIC | Age: 78
End: 2018-12-10

## 2018-12-10 RX ORDER — LEVOTHYROXINE SODIUM 0.07 MG/1
TABLET ORAL
Qty: 30 TABLET | Refills: 0 | Status: SHIPPED | OUTPATIENT
Start: 2018-12-10 | End: 2020-10-21 | Stop reason: SDUPTHER

## 2018-12-10 NOTE — TELEPHONE ENCOUNTER
CALLED [facility] AND CANCELLED LEVOTHYROXINE DEDE APPIAH IS HER NEW PHYSICIAN SHE IS NO LONGER A PT OF DR. WISEMAN'S

## 2018-12-14 ENCOUNTER — APPOINTMENT (OUTPATIENT)
Dept: SLEEP MEDICINE | Facility: HOSPITAL | Age: 78
End: 2018-12-14
Attending: INTERNAL MEDICINE

## 2018-12-20 ENCOUNTER — HOSPITAL ENCOUNTER (OUTPATIENT)
Dept: GENERAL RADIOLOGY | Facility: HOSPITAL | Age: 78
Discharge: HOME | End: 2018-12-20
Admitting: NURSE PRACTITIONER

## 2018-12-20 DIAGNOSIS — R52 PAIN: ICD-10-CM

## 2018-12-20 PROCEDURE — 73560 X-RAY EXAM OF KNEE 1 OR 2: CPT

## 2019-01-04 ENCOUNTER — APPOINTMENT (OUTPATIENT)
Dept: SLEEP MEDICINE | Facility: HOSPITAL | Age: 79
End: 2019-01-04
Attending: INTERNAL MEDICINE

## 2019-01-04 ENCOUNTER — OFFICE VISIT (OUTPATIENT)
Dept: SLEEP MEDICINE | Facility: HOSPITAL | Age: 79
End: 2019-01-04
Attending: INTERNAL MEDICINE

## 2019-01-04 VITALS
HEART RATE: 73 BPM | OXYGEN SATURATION: 95 % | SYSTOLIC BLOOD PRESSURE: 138 MMHG | DIASTOLIC BLOOD PRESSURE: 66 MMHG | BODY MASS INDEX: 24.2 KG/M2 | HEIGHT: 63 IN | WEIGHT: 136.6 LBS

## 2019-01-04 DIAGNOSIS — G47.33 OBSTRUCTIVE SLEEP APNEA: Primary | ICD-10-CM

## 2019-01-04 DIAGNOSIS — R68.2 DRY MOUTH: ICD-10-CM

## 2019-01-04 DIAGNOSIS — F45.8 AEROPHAGIA: ICD-10-CM

## 2019-01-04 DIAGNOSIS — G47.00 INSOMNIA, UNSPECIFIED TYPE: ICD-10-CM

## 2019-01-04 PROCEDURE — G0463 HOSPITAL OUTPT CLINIC VISIT: HCPCS

## 2019-01-04 NOTE — PROGRESS NOTES
[facility] SLEEP MEDICINE  [address]  3rd Floor  [facility] [address]  [phone]    PCP: Pallares, Clara Ann, MD    Reason for visit:  Sleep disorders: BALBINA    Sheila is a 78 y.o.female who was seen in the Sleep Disorders Center today. She was changed to auto 5-10 and aerophagia improved. However ahi went up and therefore increased back up to 7-13. She is having aerophagia again on and off. It is mild and not severe. Sleeps from 11p to 8:30a. She is using the dreamwear nasal pillows. Straps ride up the back of her head but pillows fit well and do not leak. She has occasional dry mouth. She wakes up feeling rested most mornings. She was out of town over [place] and did not use. She is not on Mirapex and denies RLS symptoms. Occasionally has trouble falling asleep. No longer on Ambien.  [place] Sleepiness Scale is 3. Caffeine 3 per day. Alcohol 0 per week.    Sheila  reports that  has never smoked. she has never used smokeless tobacco.    Pertinent Positive Review of Systems of denies  Rest of Review of Systems was negative as recorded in Sleep Questionnaire.    Patient  has a past medical history of Allergy desensitization therapy, Daytime sleepiness, Depression, Diverticulosis, Head trauma, History of colon polyps, Hyperlipidemia, Hypertension, Hypothyroidism, Irritable bowel syndrome, Multilevel degenerative disc disease, BALBINA on CPAP, Osteoarthritis, Sialodocholithiasis, Sleep apnea, Stroke (CMS/HCC), Thyroid disease, and TIA (transient ischemic attack) (03/2017).     Current Medications:    Current Outpatient Medications:   •  atorvastatin (LIPITOR) 80 MG tablet, TAKE ONE TABLET BY MOUTH ONCE NIGHTLY, Disp: 30 tablet, Rfl: 11  •  clopidogrel (PLAVIX) 75 MG tablet, TAKE ONE TABLET BY MOUTH DAILY, Disp: 30 tablet, Rfl: 0  •  dicyclomine (BENTYL) 20 MG tablet, Take 1 tablet by mouth Every 6 (Six) Hours., Disp: 40 tablet, Rfl: 1  •  Ergocalciferol (VITAMIN D2) 2000 UNITS tablet, Take by mouth  "daily., Disp: , Rfl:   •  ezetimibe (ZETIA) 10 MG tablet, Take 1 tablet by mouth Daily., Disp: 30 tablet, Rfl: 11  •  levothyroxine (SYNTHROID, LEVOTHROID) 75 MCG tablet, TAKE ONE TABLET BY MOUTH DAILY, Disp: 90 tablet, Rfl: 0  •  levothyroxine (SYNTHROID, LEVOTHROID) 75 MCG tablet, TAKE ONE TABLET BY MOUTH DAILY, Disp: 30 tablet, Rfl: 0  •  MethylPREDNISolone (MEDROL, ALLY,) 4 MG tablet, Take as directed on package instructions., Disp: 21 tablet, Rfl: 0  •  sertraline (ZOLOFT) 100 MG tablet, TAKE ONE TABLET BY MOUTH DAILY, Disp: 30 tablet, Rfl: 2  •  vitamin C (ASCORBIC ACID) 250 MG tablet, Take 250 mg by mouth Daily., Disp: , Rfl:   •  zolpidem (AMBIEN) 5 MG tablet, Take 1 tablet by mouth At Night As Needed for Sleep., Disp: 30 tablet, Rfl: 0   also entered in Sleep Questionnaire         Vital Signs: /66   Pulse 73   Ht 158.8 cm (62.5\")   Wt 62 kg (136 lb 9.6 oz)   SpO2 95%   BMI 24.59 kg/m²     Body mass index is 24.59 kg/m².       Tongue: large      Dentition: good       Pharynx: Posterior pharyngeal pillars are narrow   Mallampatti: III (soft and hard palate and base of uvula visible)        General: Alert. Cooperative. Well developed. No acute distress.             Head:  Normocephalic. Symmetrical. Atraumatic.              Nose: No septal deviation. No drainage.          Throat: No oral lesions. No thrush. Moist mucous membranes.    Chest Wall:  Normal shape. Symmetric expansion with respiration. No tenderness.             Neck:  Trachea midline.           Lungs:  Clear to auscultation bilaterally. No wheezes. No rhonchi. No rales. Respirations regular, even and unlabored.            Heart:  Regular rhythm and normal rate. Normal S1 and S2. No murmur.     Abdomen:  Soft, non-tender and non-distended. Normal bowel sounds. No masses.  Extremities:  Moves all extremities well. No edema.    Psychiatric: Normal mood and affect.    Study:  5/4/17- Overnight split polysomnogram study.  Diagnostic study " from 11:05 PM to 20 2 AM.  Sleep efficiency 74% with 2.43 hours total sleep time.  Sleep distribution shows absence of slow-wave sleep and REM sleep.  AHI index overall was 19.7 with a respiratory disturbance index of 41.5.  In supine position AHI is 63 with RDI of 82.  Absence of REM sleep severity may be underestimated.  Oxygen saturation remained above 89%.  Arousal index 59.  PLM index 140.  PLM with arousal index 26.  Snoring for 5.5% of total sleep time.     Titration study from 2:22 AM to 5:59 AM.  Sleep efficiency was much poor at 45% on the titration portion.  There was again no REM sleep on the titration study.  PLM index was elevated at 38 with a PLM arousal index of 4.9 on the titration study.  CPAP was increased up to 17 cm water pressure.  Maximum sleep achieved at 15 cm though even then an ideal pressure could not be found.     Testing:  · Compliance 51% with average usage is 6 hours 6 minutes.  AHI 4.6.  Average pressures 8-10 cm.  Auto CPAP is set between 7 and 13 cm.    [facility] [employer]: [employer]    Impression:  1. Obstructive sleep apnea    2. Insomnia, unspecified type    3. Dry mouth    4. Aerophagia        Plan:  Sheila will keep pressure same. Increase compliance.  Her AHI is certainly very acceptable.  Her mask is comfortable and she was last changed regularly.    Aerophagia is now mild and intermittent.  Hopefully with keeping the pressure same she will not have increased symptoms.  She was asked to call us if this should occur.    Her dry mouth is now only occasional.    She is now off the Ambien and sleeping relatively well.    I reiterated the importance of effective treatment of obstructive sleep apnea with PAP machine.  Cardiovascular health risks of untreated sleep apnea were again reviewed.  Patient was asked to remain cautious if there is persistent hypersomnolence. The benefit of weight loss in reducing severity of obstructive sleep apnea was discussed.  Patient would benefit from adhering  to a strict diet to achieve ideal BMI.     Change of PAP supplies regularly is important for effective use.  Change of cushion on the mask or plugs on nasal pillows along with disposable filters once every month and change of mask frame, tubing, headgear and Velcro straps every 6 months at the minimum was reiterated.    This patient is compliant with PAP machine and benefits from its use.  Apnea hypopneas index is corrected/improved.  Daytime hypersomnolence has resolved.     Patient will follow up in this clinic in 6 months  APRN    Thank you for allowing me to participate in your patient's care.    Adrián Samuel MD    Part of this note may be an electronic transcription/translation of spoken language to printed text using the Dragon Dictation System.

## 2019-01-07 ENCOUNTER — TELEPHONE (OUTPATIENT)
Dept: FAMILY MEDICINE CLINIC | Facility: CLINIC | Age: 79
End: 2019-01-07

## 2019-01-07 RX ORDER — CLOPIDOGREL BISULFATE 75 MG/1
TABLET ORAL
Qty: 30 TABLET | Refills: 0 | Status: SHIPPED | OUTPATIENT
Start: 2019-01-07 | End: 2020-01-15 | Stop reason: SDUPTHER

## 2019-01-07 RX ORDER — CLOPIDOGREL BISULFATE 75 MG/1
TABLET ORAL
Qty: 30 TABLET | Refills: 10 | OUTPATIENT
Start: 2019-01-07

## 2019-07-03 ENCOUNTER — OFFICE VISIT (OUTPATIENT)
Dept: NEUROLOGY | Facility: CLINIC | Age: 79
End: 2019-07-03

## 2019-07-03 VITALS — SYSTOLIC BLOOD PRESSURE: 134 MMHG | HEART RATE: 71 BPM | OXYGEN SATURATION: 96 % | DIASTOLIC BLOOD PRESSURE: 74 MMHG

## 2019-07-03 DIAGNOSIS — Z86.73 HISTORY OF STROKE: Primary | ICD-10-CM

## 2019-07-03 DIAGNOSIS — I10 ESSENTIAL HYPERTENSION: ICD-10-CM

## 2019-07-03 DIAGNOSIS — Z78.9 STATIN INTOLERANCE: ICD-10-CM

## 2019-07-03 DIAGNOSIS — E78.00 HYPERCHOLESTEROLEMIA: ICD-10-CM

## 2019-07-03 DIAGNOSIS — G47.33 OBSTRUCTIVE SLEEP APNEA: ICD-10-CM

## 2019-07-03 PROCEDURE — 99215 OFFICE O/P EST HI 40 MIN: CPT | Performed by: NURSE PRACTITIONER

## 2019-07-03 RX ORDER — FLUTICASONE PROPIONATE 50 MCG
2 SPRAY, SUSPENSION (ML) NASAL DAILY
COMMUNITY
Start: 2019-01-19

## 2019-07-03 RX ORDER — LOSARTAN POTASSIUM 25 MG/1
50 TABLET ORAL
COMMUNITY
Start: 2019-04-15 | End: 2022-03-17 | Stop reason: SDUPTHER

## 2019-07-03 RX ORDER — LINACLOTIDE 72 UG/1
CAPSULE, GELATIN COATED ORAL
COMMUNITY
Start: 2019-05-17

## 2019-07-03 NOTE — PROGRESS NOTES
DOS: 7/3/2019  NAME: Sheila Burleson   : 1940  PCP: Pallares, Clara Ann, MD    Chief Complaint   Patient presents with   • Transient Ischemic Attack      SUBJECTIVE  Neurological Problem:  78 y.o.  RHW female with HTN, HLD, BALBINA (compliant with CPAP) and TIA (, ). She presents today for annual TIA follow up. She is accompanied by her daughter. Patient and problem are new to examiner. History is provided by patient, daughter and review of records that are summarized below.     Interval History:   2017: Ms. Burleson presented to [facility] with horizontal diplopia and balance issues.  She was initially DC'd home on meclizine however her symptoms recurred and she was admitted for stroke work-up.  She was evaluated by Dr. Arizmendi who reports exam was highly suggestive of a right ARIAN, and she was presumed to have a right brainstem infarct despite negative MRI x3, which I reviewed, along with MRA h/n negative for any significant stenosis.  Her symptoms lasted less than 24 hours and resolved completely.  She was on ASA PTA due to reports of similar episode 10 years prior where she was treated at [facility]  She was discharged on Plavix and Lipitor along with prolonged cardiac monitoring that was negative for any evidence of A. fib.      She presents today and continues on Plavix but stopped taking the Lipitor and Zetia d/t severe muscle cramps per her PCP (Vonda). She is uncertain about her remaining medications. She does report having difficulties with short-term memory but otherwise denies any new stroke/TIA symptoms.  She denies any recurrence of diplopia. She has left sciatic, followed at [facility] with recent CT lumbar spine reviewed. She is getting steroid injections and PT with good results. She has been diagnosed with sleep apnea and is compliant with CPAP.  She lives independently and manages all of her own affairs.  She still drives.  She takes her BP regularly and states it is well controlled.  She denies  smoking. Rare alcohol use.     Review of Systems:Review of Systems   Constitutional: Negative for activity change, appetite change and fatigue.   HENT: Negative for ear pain, facial swelling and hearing loss.    Eyes: Negative for pain, redness and itching.   Respiratory: Negative for cough, choking and shortness of breath.    Cardiovascular: Negative for chest pain and leg swelling.   Gastrointestinal: Negative for abdominal pain, nausea and vomiting.   Skin: Negative for color change, pallor, rash and wound.   Neurological: Positive for light-headedness. Negative for dizziness, tremors, seizures, syncope, facial asymmetry, speech difficulty, weakness, numbness and headaches.   Hematological: Bruises/bleeds easily.   Psychiatric/Behavioral: Positive for decreased concentration. Negative for agitation, behavioral problems, confusion, dysphoric mood, hallucinations, self-injury, sleep disturbance and suicidal ideas. The patient is not nervous/anxious and is not hyperactive.     Above ROS reviewed    Current Medications:   Current Outpatient Medications:   •  clopidogrel (PLAVIX) 75 MG tablet, TAKE ONE TABLET BY MOUTH DAILY, Disp: 30 tablet, Rfl: 0  •  Ergocalciferol (VITAMIN D2) 2000 UNITS tablet, Take by mouth daily., Disp: , Rfl:   •  ezetimibe (ZETIA) 10 MG tablet, Take 1 tablet by mouth Daily., Disp: 30 tablet, Rfl: 11  •  fluticasone (FLONASE) 50 MCG/ACT nasal spray, 2 sprays into the nostril(s) as directed by provider Daily., Disp: , Rfl:   •  levothyroxine (SYNTHROID, LEVOTHROID) 75 MCG tablet, TAKE ONE TABLET BY MOUTH DAILY, Disp: 90 tablet, Rfl: 0  •  sertraline (ZOLOFT) 100 MG tablet, TAKE ONE TABLET BY MOUTH DAILY, Disp: 30 tablet, Rfl: 2  •  vitamin C (ASCORBIC ACID) 250 MG tablet, Take 250 mg by mouth Daily., Disp: , Rfl:   •  atorvastatin (LIPITOR) 80 MG tablet, TAKE ONE TABLET BY MOUTH ONCE NIGHTLY, Disp: 30 tablet, Rfl: 11  •  dicyclomine (BENTYL) 20 MG tablet, Take 1 tablet by mouth Every 6 (Six)  Hours., Disp: 40 tablet, Rfl: 1  •  levothyroxine (SYNTHROID, LEVOTHROID) 75 MCG tablet, TAKE ONE TABLET BY MOUTH DAILY, Disp: 30 tablet, Rfl: 0  •  LINZESS 72 MCG capsule capsule, , Disp: , Rfl:   •  losartan (COZAAR) 25 MG tablet, , Disp: , Rfl:   •  MethylPREDNISolone (MEDROL, ALLY,) 4 MG tablet, Take as directed on package instructions., Disp: 21 tablet, Rfl: 0  •  zolpidem (AMBIEN) 5 MG tablet, Take 1 tablet by mouth At Night As Needed for Sleep., Disp: 30 tablet, Rfl: 0    The following portions of the patient's history were reviewed and updated as appropriate: allergies, current medications, past family history, past medical history, past social history, past surgical history and problem list.    OBJECTIVE  Vitals:    07/03/19 0758   BP: 134/74   Pulse: 71   SpO2: 96%       Diagnostics:  MRI brain x 3 in Feb. 2017: DWI negative, moderate small vessel ischemic changes.   MRA head/neck 2/2017: No significant stenosis   EKG 2/11/17: SR  Holter Monitor 7/3/17: Interpretation Summary   Event monitor worn for 30 days.  5 patient triggered episodes.  Palpitations reported on one of the episodes with frequent monomorphic PVCs and a short duration of NSVT.  Ventricular trigeminy also noted.  Asymptomatic during that time.         Laboratory Results:         Lab Results   Component Value Date    WBC 4.25 (L) 08/25/2017    HGB 13.5 08/25/2017    HCT 43.8 08/25/2017    MCV 92.6 08/25/2017     08/25/2017     Lab Results   Component Value Date    GLUCOSE 93 02/11/2017    BUN 15 02/27/2018    CREATININE 0.73 02/27/2018    EGFRIFNONA 77 02/27/2018    EGFRIFAFRI 93 02/27/2018    BCR 20.5 02/27/2018    K 4.4 02/27/2018    CO2 26.3 02/27/2018    CALCIUM 9.7 02/27/2018    PROTENTOTREF 6.5 02/27/2018    ALBUMIN 4.30 02/27/2018    LABIL2 2.0 02/27/2018    AST 25 02/27/2018    ALT 23 02/27/2018     Lab Results   Component Value Date    HGBA1C 5.23 02/08/2017     Lab Results   Component Value Date    CHOL 229 (H) 02/08/2017      Lab Results   Component Value Date    HDL 51 02/27/2018    HDL 53 08/25/2017    HDL 45 05/10/2017     Lab Results   Component Value Date    LDL 36 02/27/2018    LDL 36 08/25/2017    LDL 43 05/10/2017     Lab Results   Component Value Date    TRIG 79 02/27/2018    TRIG 59 08/25/2017    TRIG 128 05/10/2017     No results found for: RPR  Lab Results   Component Value Date    TSH 0.685 08/25/2017     Lab Results   Component Value Date    EHQEQWEU35 991 (H) 02/01/2017       Physical Examination:   General Appearance:   Well developed, well nourished, well groomed, alert, and cooperative.  HEENT: Normocephalic.    Neck and Spine: Normal range of motion.  Normal alignment. No mass or tenderness. No bruits.  Cardiac: Regular rate and rhythm.   Peripheral Vasculature: Radial pulses are equal and symmetric. No signs of distal embolization.  Extremities:    No edema or deformities. Normal joint ROM.  Skin:    No rashes or birth marks.  Psychiatric:    Euthymic. Normal affect.    Neurological examination:  Higher Integrative  Function: Oriented to time, place and person. Normal registration, recall (3/3), attention span and concentration. Normal language including comprehension, spontaneous speech, repetition, reading, writing, naming and vocabulary. No neglect with normal visual-spatial function and construction. Normal fund of knowledge and higher integrative function.  CN II: Pupils are equal, round, and reactive to light. Normal visual acuity and visual fields.    CN III IV VI: Extraocular movements are full without nystagmus.   CN V: Normal facial sensation and strength of muscles of mastication.  CN VII: Facial movements are symmetric. No weakness.  CN VIII:   Auditory acuity is normal.  CN IX & X:   Symmetric palatal movement.  CN XI: Sternocleidomastoid and trapezius are normal.  No weakness.  CN XII:   The tongue is midline.  No atrophy or fasciculations.  Motor: Normal muscle strength, bulk and tone in upper and  lower extremities.  Mild cephalic tremor.   Reflexes: 2+ in the upper and lower extremities.   Sensation: Normal to light touch, vibration, temperature, and proprioception in arms and legs. Normal graphesthesia and no extinction on DSS.  Station and Gait: Slightly antalgic gait but otherwise normal gait and station.    Coordination: Finger to nose test shows no dysmetria.  Heel to shin normal.    Impression:  Ms. Burleson presents for follow-up of presumed brainstem stroke she suffered in 2017.  She has since been maintained on Plavix with no recurrent or new stroke/TIA symptoms.  She has been on and off of statins due to c/o muscle cramps, most recent LDL over a year prior was 36.  She follows up regularly with her PCP for cholesterol surveillance.  Recommend goal LDL 40-70.  We discussed the importance of risk factor control for stroke prevention including BP and cholesterol management.  We reviewed the signs and symptoms of stroke and the importance of calling 911 if she were to develop any of these.  She will follow-up in 1 year, sooner if symptoms warrant.    Plan:     Continue Plavix  Monitor BP regularly  F/U with PCP for continued lesser all surveillance (goal LDL 40-70)  Encouraged continued compliance with CPAP  Keep well-hyydrated  Encouraged regular physical activity  Secondary stroke prevention: Ideal targets for stroke prevention would be Blood pressure < 130/80; B12 > 500 TSH in normal range and LDL < 70; HbA1c < 6.5 and smoking cessation if applicable.    Call 911 for stroke symptoms  Follow-up in one year    I spent 40 minutes face to face with patient and daughter, with  > 50% spent counseling patient regarding diagnosis, review of diagnostics, personal risk factors for stroke and importance of risk factor control for stroke prevention.     Sheila was seen today for transient ischemic attack.    Diagnoses and all orders for this visit:    History of stroke    Essential hypertension    Obstructive sleep  apnea    Hypercholesterolemia    Statin intolerance        Coding      Dictated using Dragon

## 2019-07-10 ENCOUNTER — APPOINTMENT (OUTPATIENT)
Dept: SLEEP MEDICINE | Facility: HOSPITAL | Age: 79
End: 2019-07-10

## 2019-08-14 ENCOUNTER — APPOINTMENT (OUTPATIENT)
Dept: SLEEP MEDICINE | Facility: HOSPITAL | Age: 79
End: 2019-08-14

## 2019-08-15 ENCOUNTER — OFFICE VISIT (OUTPATIENT)
Dept: SLEEP MEDICINE | Facility: HOSPITAL | Age: 79
End: 2019-08-15

## 2019-08-15 VITALS
HEIGHT: 62 IN | DIASTOLIC BLOOD PRESSURE: 63 MMHG | BODY MASS INDEX: 25.4 KG/M2 | HEART RATE: 74 BPM | SYSTOLIC BLOOD PRESSURE: 133 MMHG | OXYGEN SATURATION: 96 % | WEIGHT: 138 LBS

## 2019-08-15 DIAGNOSIS — G47.33 OBSTRUCTIVE SLEEP APNEA: Primary | ICD-10-CM

## 2019-08-15 PROCEDURE — G0463 HOSPITAL OUTPT CLINIC VISIT: HCPCS

## 2019-08-15 NOTE — PROGRESS NOTES
[facility]  Telephone: [phone] / Fax: [phone] [place]  Telephone: [phone] / Fax: [phone] Arlette Vasquez    PCP: Pallares, Clara Ann, MD    Reason for visit: BALBINA f/u    Sheila Burleson is a 78 y.o.female  was last seen at [facility] sleep lab in January 2019. Since last visit patient has done well on auto CPAP 7-13cm with average pressure of 9.3. Sleep quality has improved and excessive sleepiness/snoring is no longer an issue. The patient goes to bed at 11pm and is up at 7:30am. She uses under the nose mask and it fits well. She denies aerophagia or dry mouth.    DME- [employer].    SH- 0  tobacco, 0 alcohol, 0 energy drinks, 6 cups of caffeine per week    ROS is positive for cough. Rest of the ROS is negative.    Current Medications:    Current Outpatient Medications:   •  atorvastatin (LIPITOR) 80 MG tablet, TAKE ONE TABLET BY MOUTH ONCE NIGHTLY, Disp: 30 tablet, Rfl: 11  •  clopidogrel (PLAVIX) 75 MG tablet, TAKE ONE TABLET BY MOUTH DAILY, Disp: 30 tablet, Rfl: 0  •  dicyclomine (BENTYL) 20 MG tablet, Take 1 tablet by mouth Every 6 (Six) Hours., Disp: 40 tablet, Rfl: 1  •  Ergocalciferol (VITAMIN D2) 2000 UNITS tablet, Take by mouth daily., Disp: , Rfl:   •  ezetimibe (ZETIA) 10 MG tablet, Take 1 tablet by mouth Daily., Disp: 30 tablet, Rfl: 11  •  fluticasone (FLONASE) 50 MCG/ACT nasal spray, 2 sprays into the nostril(s) as directed by provider Daily., Disp: , Rfl:   •  levothyroxine (SYNTHROID, LEVOTHROID) 75 MCG tablet, TAKE ONE TABLET BY MOUTH DAILY, Disp: 90 tablet, Rfl: 0  •  levothyroxine (SYNTHROID, LEVOTHROID) 75 MCG tablet, TAKE ONE TABLET BY MOUTH DAILY, Disp: 30 tablet, Rfl: 0  •  LINZESS 72 MCG capsule capsule, , Disp: , Rfl:   •  losartan (COZAAR) 25 MG tablet, , Disp: , Rfl:   •  MethylPREDNISolone (MEDROL, ALLY,) 4 MG tablet, Take as directed on package instructions., Disp: 21 tablet, Rfl: 0  •  sertraline (ZOLOFT) 100 MG tablet, TAKE ONE TABLET BY MOUTH DAILY, Disp:  "30 tablet, Rfl: 2  •  vitamin C (ASCORBIC ACID) 250 MG tablet, Take 250 mg by mouth Daily., Disp: , Rfl:   •  zolpidem (AMBIEN) 5 MG tablet, Take 1 tablet by mouth At Night As Needed for Sleep., Disp: 30 tablet, Rfl: 0   also entered in Sleep Questionnaire    Patient  has a past medical history of Allergy desensitization therapy, Daytime sleepiness, Depression, Diverticulosis, Head trauma, History of colon polyps, Hyperlipidemia, Hypertension, Hypothyroidism, Irritable bowel syndrome, Multilevel degenerative disc disease, BALBINA on CPAP, Osteoarthritis, Sialodocholithiasis, Sleep apnea, Stroke (CMS/HCC), Thyroid disease, and TIA (transient ischemic attack) (03/2017).    I have reviewed the Past Medical History, Past Surgical History, Social History and Family History.    Vital Signs /63   Pulse 74   Ht 157.5 cm (62\")   Wt 62.6 kg (138 lb)   SpO2 96%   BMI 25.24 kg/m²  Body mass index is 25.24 kg/m².    General Alert and oriented. No acute distress noted   Pharynx/Throat Class IV Mallampati airway, large tongue, no evidence of redundant lateral pharyngeal tissue. No oral lesions. No thrush. Moist mucous membranes.   Head Normocephalic. Symmetrical. Atraumatic.    Nose No septal deviation. No drainage   Chest Wall Normal shape. Symmetric expansion with respiration. No tenderness.   Neck Trachea midline, no thyromegaly or adenopathy    Lungs Clear to auscultation bilaterally. No wheezes. No rhonchi. No rales. Respirations regular, even and unlabored.   Heart Regular rhythm and normal rate. Normal S1 and S2. No murmur   Abdomen Soft, non-tender and non-distended. Normal bowel sounds. No masses.   Extremities Moves all extremities well. No edema   Psychiatric Normal mood and affect.     Testing:  Download 5/9/19-8/6/19- 75% use with average nightly use of 5 hours and 6 minutes on auto CPAP with average pressure of 9.3, AHI 4.4.    Study:  5/4/17- Overnight split polysomnogram study.  Diagnostic study from 11:05 " PM to 20 2 AM.  Sleep efficiency 74% with 2.43 hours total sleep time.  Sleep distribution shows absence of slow-wave sleep and REM sleep.  AHI index overall was 19.7 with a respiratory disturbance index of 41.5.  In supine position AHI is 63 with RDI of 82.  Absence of REM sleep severity may be underestimated.  Oxygen saturation remained above 89%.  Arousal index 59.  PLM index 140.  PLM with arousal index 26.  Snoring for 5.5% of total sleep time.     Titration study from 2:22 AM to 5:59 AM.  Sleep efficiency was much poor at 45% on the titration portion.  There was again no REM sleep on the titration study.  PLM index was elevated at 38 with a PLM arousal index of 4.9 on the titration study.  CPAP was increased up to 17 cm water pressure.  Maximum sleep achieved at 15 cm though even then an ideal pressure could not be found.        Impression:  1. Obstructive sleep apnea          Plan:  Patient uses the CPAP device and benefits from its use in terms of reduction of hypersomnia and snoring.  AHI appears to be within adequate range. I reviewed download report and original sleep study report with the patient.     Weight loss will be strongly beneficial to reduce the severity of sleep-disordered breathing.  Caution during activities that require prolonged concentration is strongly advised if sleepiness returns. Changing of PAP supplies regularly is important for effective use. Patient needs to change cushion on the mask or plugs on nasal pillows along with disposable filters once every month and change mask frame, tubing, headgear and Velcro straps every 6 months at the minimum.       Follow up with Dr. Samuel in one year    Thank you for allowing me to participate in your patient's care.      KAREN Soto  [facility]  Phone: [phone]      Part of this note may be an electronic transcription/translation of spoken language to printed text using the Dragon Dictation System.

## 2020-01-15 NOTE — TELEPHONE ENCOUNTER
PT STS THAT SHE NEEDS A REFILL ON HER   CLOPIDOGREL 75MG ..PLEASE CALL THAT TO [facility] @ [phone] ..

## 2020-01-15 NOTE — TELEPHONE ENCOUNTER
Pt last seen 7/3/19. Follow up scheduled 7/8/2020.       Last filled 1/7/19 quantity 30 for 30 days.      Please review and approve or advise.     Thank you.       ^pt called back, she wants it filled @ the [facility] on file.

## 2020-01-16 RX ORDER — CLOPIDOGREL BISULFATE 75 MG/1
75 TABLET ORAL DAILY
Qty: 30 TABLET | Refills: 6 | Status: SHIPPED | OUTPATIENT
Start: 2020-01-16 | End: 2020-08-06

## 2020-08-06 RX ORDER — CLOPIDOGREL BISULFATE 75 MG/1
TABLET ORAL
Qty: 30 TABLET | Refills: 5 | Status: SHIPPED | OUTPATIENT
Start: 2020-08-06 | End: 2021-01-14

## 2020-08-06 NOTE — TELEPHONE ENCOUNTER
Pt last seen 7/3/19. Follow up scheduled 8/26/20.       Quantity 30 for 30 days.      Please review and approve or advise.     Thank you.

## 2020-08-07 ENCOUNTER — APPOINTMENT (OUTPATIENT)
Dept: SLEEP MEDICINE | Facility: HOSPITAL | Age: 80
End: 2020-08-07

## 2020-09-18 ENCOUNTER — OFFICE VISIT (OUTPATIENT)
Dept: SLEEP MEDICINE | Facility: HOSPITAL | Age: 80
End: 2020-09-18

## 2020-09-18 VITALS
HEIGHT: 62 IN | WEIGHT: 136 LBS | DIASTOLIC BLOOD PRESSURE: 67 MMHG | HEART RATE: 73 BPM | OXYGEN SATURATION: 96 % | SYSTOLIC BLOOD PRESSURE: 123 MMHG | BODY MASS INDEX: 25.03 KG/M2

## 2020-09-18 DIAGNOSIS — G47.33 OBSTRUCTIVE SLEEP APNEA: Primary | ICD-10-CM

## 2020-09-18 PROCEDURE — G0463 HOSPITAL OUTPT CLINIC VISIT: HCPCS

## 2020-09-18 NOTE — PROGRESS NOTES
[facility]  [address]  3RD FLOOR  [place] [address]  [phone]    PCP: Pallares, Clara Ann, MD    Reason for visit:  Sleep disorders: BALBINA    Sheila is a 80 y.o.female who was seen in the Sleep Disorders Center today. Annual fu. Most nights she is doing well with the CPAP. No further aerophagia. Using nasal cushions, changes regularly. Sleeps from 10 pm to 8:30 am. She changes her mask regularly. She wakes up rested. She does not put her mask back on if wakes up to use restroom.  [place] Sleepiness Scale is 0. Caffeine 1 per day. Alcohol 0 per week.    Sheila  reports that she has never smoked. She has never used smokeless tobacco.    Pertinent Positive Review of Systems of abdominal bloating occurs during day and not due to CPAP  Rest of Review of Systems was negative as recorded in Sleep Questionnaire.    Patient  has a past medical history of Allergy desensitization therapy, Daytime sleepiness, Depression, Diverticulosis, Head trauma, History of colon polyps, Hyperlipidemia, Hypertension, Hypothyroidism, Irritable bowel syndrome, Multilevel degenerative disc disease, BALBINA on CPAP, Osteoarthritis, Sialodocholithiasis, Sleep apnea, Stroke (CMS/HCC), Thyroid disease, and TIA (transient ischemic attack) (03/2017).     Current Medications:    Current Outpatient Medications:   •  atorvastatin (LIPITOR) 80 MG tablet, TAKE ONE TABLET BY MOUTH ONCE NIGHTLY, Disp: 30 tablet, Rfl: 11  •  clopidogrel (PLAVIX) 75 MG tablet, TAKE ONE TABLET BY MOUTH DAILY, Disp: 30 tablet, Rfl: 5  •  dicyclomine (BENTYL) 20 MG tablet, Take 1 tablet by mouth Every 6 (Six) Hours., Disp: 40 tablet, Rfl: 1  •  Ergocalciferol (VITAMIN D2) 2000 UNITS tablet, Take by mouth daily., Disp: , Rfl:   •  ezetimibe (ZETIA) 10 MG tablet, Take 1 tablet by mouth Daily., Disp: 30 tablet, Rfl: 11  •  fluticasone (FLONASE) 50 MCG/ACT nasal spray, 2 sprays into the nostril(s) as directed by provider Daily., Disp: , Rfl:   •   "levothyroxine (SYNTHROID, LEVOTHROID) 75 MCG tablet, TAKE ONE TABLET BY MOUTH DAILY, Disp: 90 tablet, Rfl: 0  •  levothyroxine (SYNTHROID, LEVOTHROID) 75 MCG tablet, TAKE ONE TABLET BY MOUTH DAILY, Disp: 30 tablet, Rfl: 0  •  LINZESS 72 MCG capsule capsule, , Disp: , Rfl:   •  losartan (COZAAR) 25 MG tablet, , Disp: , Rfl:   •  MethylPREDNISolone (MEDROL, ALLY,) 4 MG tablet, Take as directed on package instructions., Disp: 21 tablet, Rfl: 0  •  sertraline (ZOLOFT) 100 MG tablet, TAKE ONE TABLET BY MOUTH DAILY, Disp: 30 tablet, Rfl: 2  •  vitamin C (ASCORBIC ACID) 250 MG tablet, Take 250 mg by mouth Daily., Disp: , Rfl:   •  zolpidem (AMBIEN) 5 MG tablet, Take 1 tablet by mouth At Night As Needed for Sleep., Disp: 30 tablet, Rfl: 0   also entered in Sleep Questionnaire         Vital Signs: /67   Pulse 73   Ht 157.5 cm (62\")   Wt 61.7 kg (136 lb)   SpO2 96%   BMI 24.87 kg/m²     Body mass index is 24.87 kg/m².       Tongue: Large       Dentition: good       Pharynx: Posterior pharyngeal pillars are unable to see   Mallampatti: IV (only hard palate visible)        General: Alert. Cooperative. Well developed. No acute distress.             Head:  Normocephalic. Symmetrical. Atraumatic.              Nose: No septal deviation. No drainage.          Throat: No oral lesions. No thrush. Moist mucous membranes.    Chest Wall:  Normal shape. Symmetric expansion with respiration. No tenderness.             Neck:  Trachea midline.           Lungs:  Clear to auscultation bilaterally. No wheezes. No rhonchi. No rales. Respirations regular, even and unlabored.            Heart:  Regular rhythm and normal rate. Normal S1 and S2. No murmur.     Abdomen:  Soft, non-tender and non-distended. Normal bowel sounds. No masses.  Extremities:  Moves all extremities well. No edema.    Psychiatric: Normal mood and affect.    Study:  5/4/17- Overnight split polysomnogram study.  Diagnostic study from 11:05 PM to 20 2 AM.  Sleep " efficiency 74% with 2.43 hours total sleep time.  Sleep distribution shows absence of slow-wave sleep and REM sleep.  AHI index overall was 19.7 with a respiratory disturbance index of 41.5.  In supine position AHI is 63 with RDI of 82.  Absence of REM sleep severity may be underestimated.  Oxygen saturation remained above 89%.  Arousal index 59.  PLM index 140.  PLM with arousal index 26.  Snoring for 5.5% of total sleep time.     Titration study from 2:22 AM to 5:59 AM.  Sleep efficiency was much poor at 45% on the titration portion.  There was again no REM sleep on the titration study.  PLM index was elevated at 38 with a PLM arousal index of 4.9 on the titration study.  CPAP was increased up to 17 cm water pressure.  Maximum sleep achieved at 15 cm though even then an ideal pressure could not be found.    Testing:  · 84% compliance average usage 4 hours 48 minutes AHI 3.6 average pressure 8 to 9 cm    DME Company: [employer]    Impression:  1. Obstructive sleep apnea        Plan:  Sheila is able to use the CPAP but does not put it back on when she uses the restroom at night.  Advised to do so since her underlying sleep apnea especially in supine position is extremely severe.  Health risks discussed again.  She promises to try doing so.  She will change supplies regularly.  Otherwise no problems with usage.    I reiterated the importance of effective treatment of obstructive sleep apnea with PAP machine.  Cardiovascular health risks of untreated sleep apnea were again reviewed.  Patient was asked to remain cautious if there is persistent hypersomnolence. The benefit of weight loss in reducing severity of obstructive sleep apnea was discussed.  Patient would benefit from adhering to a strict diet to achieve ideal BMI.     Change of PAP supplies regularly is important for effective use.  Change of cushion on the mask or plugs on nasal pillows along with disposable filters once every month and change of mask frame,  tubing, headgear and Velcro straps every 6 months at the minimum was reiterated.    This patient is compliant with PAP machine and benefits from its use.  Apnea hypopneas index is corrected/improved.  Daytime hypersomnolence has resolved.     Patient will follow up in this clinic in 1 year APRN    Thank you for allowing me to participate in your patient's care.    Electronically signed by Adrián Samuel MD, 09/18/20, 9:36 AM EDT.    Part of this note may be an electronic transcription/translation of spoken language to printed text using the Dragon Dictation System.

## 2020-10-21 ENCOUNTER — OFFICE VISIT (OUTPATIENT)
Dept: NEUROLOGY | Facility: CLINIC | Age: 80
End: 2020-10-21

## 2020-10-21 VITALS
SYSTOLIC BLOOD PRESSURE: 120 MMHG | HEIGHT: 62 IN | WEIGHT: 138 LBS | DIASTOLIC BLOOD PRESSURE: 76 MMHG | BODY MASS INDEX: 25.4 KG/M2 | OXYGEN SATURATION: 98 % | HEART RATE: 68 BPM

## 2020-10-21 DIAGNOSIS — R41.89 SUBJECTIVE MEMORY COMPLAINTS: ICD-10-CM

## 2020-10-21 DIAGNOSIS — E78.00 HYPERCHOLESTEROLEMIA: ICD-10-CM

## 2020-10-21 DIAGNOSIS — G47.33 OBSTRUCTIVE SLEEP APNEA: ICD-10-CM

## 2020-10-21 DIAGNOSIS — I63.81 CEREBROVASCULAR ACCIDENT (CVA) DUE TO STENOSIS OF SMALL ARTERY (HCC): Primary | ICD-10-CM

## 2020-10-21 DIAGNOSIS — I10 ESSENTIAL HYPERTENSION: ICD-10-CM

## 2020-10-21 DIAGNOSIS — Z78.9 STATIN INTOLERANCE: ICD-10-CM

## 2020-10-21 PROCEDURE — 99214 OFFICE O/P EST MOD 30 MIN: CPT | Performed by: NURSE PRACTITIONER

## 2020-10-21 RX ORDER — MONTELUKAST SODIUM 10 MG/1
TABLET ORAL
COMMUNITY
Start: 2020-08-25 | End: 2020-10-21

## 2020-10-21 RX ORDER — ALENDRONATE SODIUM 70 MG/1
TABLET ORAL
COMMUNITY
Start: 2020-10-05

## 2020-10-21 RX ORDER — PITAVASTATIN CALCIUM 2.09 MG/1
TABLET, FILM COATED ORAL
COMMUNITY
Start: 2020-10-05

## 2020-10-21 RX ORDER — TRAZODONE HYDROCHLORIDE 50 MG/1
TABLET ORAL
COMMUNITY
Start: 2020-09-08

## 2020-10-21 RX ORDER — NITROFURANTOIN 25; 75 MG/1; MG/1
CAPSULE ORAL
COMMUNITY
Start: 2020-09-30 | End: 2020-10-21

## 2020-10-21 RX ORDER — AZELASTINE 1 MG/ML
1-2 SPRAY, METERED NASAL
COMMUNITY
Start: 2020-08-25 | End: 2021-08-25

## 2020-10-21 NOTE — PROGRESS NOTES
"DOS: 10/21/2020  NAME: Sheila Burleson   : 1940  PCP: Desmond Zepeda MD    Chief Complaint   Patient presents with   • Transient Ischemic Attack      SUBJECTIVE  Neurological Problem:  80 y.o. RHW female with HTN, HLD, BALBINA (compliant with CPAP) and TIA (, ). She presents today for annual TIA follow up. She is unaccompanied.     Interval History:   2017: Ms. Burleson presented to [facility] with horizontal diplopia and balance issues.  She was initially DC'd home on meclizine however her symptoms recurred and she was admitted for stroke work-up.  She was evaluated by Dr. Arizmendi who reports exam was highly suggestive of a right ARIAN, and she was presumed to have a right brainstem infarct despite negative MRI x3, which I reviewed, along with MRA h/n negative for any significant stenosis.  Her symptoms lasted less than 24 hours and resolved completely.  She was on ASA PTA due to reports of similar episode 10 years prior where she was treated at [facility].  She was discharged on Plavix and Lipitor along with prolonged cardiac monitoring that was negative for any evidence of A. fib.       She presents today and continues on Plavix, is seeing endocrinology for cholesterol issues, is currently on Livalo, ?Zetia. She also reports having a spot on her kidney that is being \"watched\".  She has complaints of some difficulties with memory, sometimes difficulty finding words but otherwise denies any new stroke/TIA symptoms.  She denies any vision changes.  She is currently working with endocrinologist to treat her hyperlipidemia.  Review of recent labs from October and 2020 show an essentially unremarkable CMP, A1c of 5.2; TSH 0.547; lipid panel with a total of 281, HDL 44, , ; vitamin D 38.  She has some trouble with her left knee.  She denies any other changes in her health since her last visit.  She states her BP is well controlled.  She is compliant with her CPAP.  No smoking.  Rare alcohol " use.  No changes in gait, no falls.    Review of Systems:Review of Systems   Constitutional: Negative for activity change, appetite change and fatigue.   HENT: Negative for ear pain, tinnitus and trouble swallowing.    Eyes: Negative for photophobia, pain and visual disturbance.   Musculoskeletal: Negative for back pain, gait problem and neck pain.   Neurological: Negative for dizziness, tremors, seizures, syncope, facial asymmetry, speech difficulty, weakness, light-headedness, numbness and headaches.   Psychiatric/Behavioral: Positive for decreased concentration and sleep disturbance. Negative for agitation, behavioral problems, confusion, dysphoric mood, hallucinations, self-injury and suicidal ideas. The patient is not nervous/anxious and is not hyperactive.     Above ROS reviewed    The following portions of the patient's history were reviewed and updated as appropriate: allergies, current medications, past family history, past medical history, past social history, past surgical history and problem list.    Current Medications:   Current Outpatient Medications:   •  alendronate (FOSAMAX) 70 MG tablet, TAKE 1 TABLET BY MOUTH ONCE WEEKLY BEFORE BREAKFAST, ON AN EMPTY STOMACH: REMAIN UPRIGHT FOR 30 MINUTES:TAKE WITH 8 OUNCES OF WATER, Disp: , Rfl:   •  azelastine (ASTELIN) 0.1 % nasal spray, 1-2 sprays into the nostril(s) as directed by provider., Disp: , Rfl:   •  clopidogrel (PLAVIX) 75 MG tablet, TAKE ONE TABLET BY MOUTH DAILY, Disp: 30 tablet, Rfl: 5  •  Ergocalciferol (VITAMIN D2) 2000 UNITS tablet, Take by mouth daily., Disp: , Rfl:   •  ezetimibe (ZETIA) 10 MG tablet, Take 1 tablet by mouth Daily., Disp: 30 tablet, Rfl: 11  •  fluticasone (FLONASE) 50 MCG/ACT nasal spray, 2 sprays into the nostril(s) as directed by provider Daily., Disp: , Rfl:   •  levothyroxine (SYNTHROID, LEVOTHROID) 75 MCG tablet, TAKE ONE TABLET BY MOUTH DAILY, Disp: 90 tablet, Rfl: 0  •  LINZESS 72 MCG capsule capsule, , Disp: , Rfl:    •  Livalo 2 MG tablet tablet, , Disp: , Rfl:   •  losartan (COZAAR) 25 MG tablet, 50 mg., Disp: , Rfl:   •  sertraline (ZOLOFT) 100 MG tablet, TAKE ONE TABLET BY MOUTH DAILY, Disp: 30 tablet, Rfl: 2  •  traZODone (DESYREL) 50 MG tablet, , Disp: , Rfl:   •  vitamin C (ASCORBIC ACID) 250 MG tablet, Take 250 mg by mouth Daily., Disp: , Rfl:   **I did not stop or change the above medications.  Patient's medication list was updated to reflect medications they have reported as currently taking, including medication changes made by other providers.    OBJECTIVE  Vitals:    10/21/20 1304   BP: 120/76   Pulse: 68   SpO2: 98%     Body mass index is 25.23 kg/m².    Diagnostics:    Laboratory Results:         Lab Results   Component Value Date    WBC 4.79 10/13/2020    HGB 13.0 10/13/2020    HCT 43.7 10/13/2020    MCV 91.6 10/13/2020     10/13/2020     Lab Results   Component Value Date    GLUCOSE 93 02/11/2017    BUN 19 10/13/2020    CREATININE 0.7 10/13/2020    EGFRIFNONA 77 02/27/2018    EGFRIFAFRI 93 02/27/2018    BCR 27.0 (H) 10/13/2020    K 4.5 10/13/2020    CO2 26 10/13/2020    CALCIUM 9.6 10/13/2020    PROTENTOTREF 6.5 02/27/2018    ALBUMIN 4.3 10/13/2020    LABIL2 1.7 10/13/2020    AST <5 (L) 10/13/2020    ALT 13 10/13/2020     Lab Results   Component Value Date    HGBA1C 5.2 09/11/2020     Lab Results   Component Value Date    CHOL 229 (H) 02/08/2017     Lab Results   Component Value Date    HDL 44 (L) 09/11/2020    HDL 49 (L) 12/10/2019    HDL 51 02/27/2018     Lab Results   Component Value Date     (H) 09/11/2020     (H) 12/10/2019    LDL 36 02/27/2018     Lab Results   Component Value Date    TRIG 226 (H) 09/11/2020    TRIG 126 12/10/2019    TRIG 79 02/27/2018     No results found for: RPR  Lab Results   Component Value Date    TSH 0.547 07/27/2020     Lab Results   Component Value Date    [id number] 502 12/10/2019       Physical Exam:  GENERAL: NAD  HEENT: Normocephalic, atraumatic   COR:  RRR  Resp: Even and unlabored  Extremities: Equal pulses, no signs of distal embolization.  Left knee with mild edema compared to right.  Skin: No rashes, lesions or ulcers.  Psychiatric: Normal mood and affect.    Neurological:   MS: AO. Language normal. No neglect. Recall (3/3). Follows all commands.  CN: II-XII grossly normal  Motor: Normal strength and tone throughout.  Sensory: Intact to light touch in arms and legs  Station and Gait: Mildly antalgic gait, but otherwise normal gait and station.    Coordination: Normal finger to nose bilaterally    Impression: Ms. Burleson presents for follow-up of a presumed brainstem stroke she suffered in 2017.  She has since been maintained on Plavix with no recurrent or new stroke/TIA symptoms.  She is currently working with endocrinology due to to tolerance to statins, most recent LDL at 192.  We discussed the importance of aggressive control, apparently Repatha has been discussed, she will continue to follow-up with endocrinology.  We reviewed lifestyle modifications for cognitive health.  We reviewed the signs and symptoms of stroke and the importance of calling 911 if she were to develop any of these.  We will follow up here in 1 year, sooner if symptoms warrant.  She voiced understanding and agrees with the plan.    Plan:     1. Stroke  Continue Plavix  Strongly encouraged treatment for hyperlipidemia, goal LDL less than 70  Encouraged continued compliance with CPAP  Monitor BP regularly  Keep well-hydrated  Encourage continued compliance with CPAP  Secondary stroke prevention: Ideal targets for stroke prevention would be Blood pressure < 130/80; B12 > 500 TSH in normal range and LDL < 70; HbA1c < 6.5 and smoking cessation if applicable.  Call 911 for stroke symptoms  Follow-up in 1 year    2.  Subjective memory complaints  Normal neurologic exam  Continue to monitor  Recommended Lifestyle Modifications:   -Regular physical activity (ie Silver Sneakers programs)  -Regular  social activity (ie clubs, [employer] groups, etc)  -Regular mental stimulation (ie puzzles, cross-words, crafts, etc)  -Healthy diet (ie Mediterranean or DASH diet)    Greater than 50% of this 25-minute visit was spent counseling patient regarding diagnosis, review of diagnostics, personal risk factors for stroke and importance of risk factor control for stroke prevention, including lifestyle modifications and preventative measures.     .     Diagnoses and all orders for this visit:    1. 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 Hypercholesterolemia    3. Essential hypertension    4. Statin intolerance    5. Obstructive sleep apnea    6. Subjective memory complaints        Coding      Dictated using Dragon

## 2021-01-14 RX ORDER — CLOPIDOGREL BISULFATE 75 MG/1
TABLET ORAL
Qty: 30 TABLET | Refills: 4 | Status: SHIPPED | OUTPATIENT
Start: 2021-01-14 | End: 2021-06-14

## 2021-01-14 NOTE — TELEPHONE ENCOUNTER
Pt last seen 10/21/2020. Follow up scheduled 10/20/2021.       Quantity 30 for 30 days.      Please review and approve or advise.     Thank you.      164

## 2021-03-02 DIAGNOSIS — Z23 IMMUNIZATION DUE: ICD-10-CM

## 2021-06-14 RX ORDER — CLOPIDOGREL BISULFATE 75 MG/1
TABLET ORAL
Qty: 30 TABLET | Refills: 4 | Status: SHIPPED | OUTPATIENT
Start: 2021-06-14 | End: 2021-07-13 | Stop reason: SDUPTHER

## 2021-06-14 NOTE — TELEPHONE ENCOUNTER
Pt last seen 10/21/2020. Follow up scheduled 10/27/2021.       Quantity 30 for 30 days.      Please review and approve or advise.     Thank you.

## 2021-07-13 DIAGNOSIS — I63.81 CEREBROVASCULAR ACCIDENT (CVA) DUE TO STENOSIS OF SMALL ARTERY (HCC): Primary | ICD-10-CM

## 2021-07-13 RX ORDER — CLOPIDOGREL BISULFATE 75 MG/1
75 TABLET ORAL DAILY
Qty: 90 TABLET | Refills: 1 | Status: SHIPPED | OUTPATIENT
Start: 2021-07-13 | End: 2021-12-14 | Stop reason: SDUPTHER

## 2021-07-13 NOTE — TELEPHONE ENCOUNTER
Pt was last seen on 10/21/2020. Following up on 10/27/2021 for year follow up. Pt requesting 90 day supply to [facility]. This was sent to [facility] back on 6/13/2021 however pt would like mail order at [facility]

## 2021-09-20 ENCOUNTER — APPOINTMENT (OUTPATIENT)
Dept: SLEEP MEDICINE | Facility: HOSPITAL | Age: 81
End: 2021-09-20

## 2021-10-18 ENCOUNTER — OFFICE VISIT (OUTPATIENT)
Dept: SLEEP MEDICINE | Facility: HOSPITAL | Age: 81
End: 2021-10-18

## 2021-10-18 VITALS
WEIGHT: 135 LBS | BODY MASS INDEX: 24.84 KG/M2 | SYSTOLIC BLOOD PRESSURE: 135 MMHG | OXYGEN SATURATION: 96 % | DIASTOLIC BLOOD PRESSURE: 69 MMHG | HEIGHT: 62 IN

## 2021-10-18 DIAGNOSIS — G47.33 OBSTRUCTIVE SLEEP APNEA: Primary | ICD-10-CM

## 2021-10-18 DIAGNOSIS — J93.82 AIR LEAK: ICD-10-CM

## 2021-10-18 PROCEDURE — G0463 HOSPITAL OUTPT CLINIC VISIT: HCPCS

## 2021-10-18 NOTE — PROGRESS NOTES
[facility]  Telephone: [phone] / Fax: [phone] [place]  Telephone: [phone] / Fax: [phone] Arlette Vasquez    PCP: Desmond Zepeda MD    Reason for visit: BALBINA f/u    Sheila Burleson is a 81 y.o.female  was last seen at [facility] sleep lab 1 year ago. She reports problems with current mask. She has under the nose mask. She feels that the air is leaking and pressures are way too high.  She feels that CPAP device has overall made a big difference. She is able to sleep through the night without significant discomfort from the mask or strap.  She is unaware of snoring or witnessed apneas on the machine.    Since last visit patient has done well on auto CPAP.  Sleep quality has improved on CPAP and excessive sleepiness/snoring is no longer an issue.  There is no complaint of aerophagia, excessive dry mouth or air leak.    SH- no tobacco, no alcohol, 1 caffeine, 2 energy drinks, rest is negative.    ROS- negative    [facility]    Current Medications:    Current Outpatient Medications:   •  alendronate (FOSAMAX) 70 MG tablet, TAKE 1 TABLET BY MOUTH ONCE WEEKLY BEFORE BREAKFAST, ON AN EMPTY STOMACH: REMAIN UPRIGHT FOR 30 MINUTES:TAKE WITH 8 OUNCES OF WATER, Disp: , Rfl:   •  clopidogrel (PLAVIX) 75 MG tablet, Take 1 tablet by mouth Daily., Disp: 90 tablet, Rfl: 1  •  Ergocalciferol (VITAMIN D2) 2000 UNITS tablet, Take by mouth daily., Disp: , Rfl:   •  ezetimibe (ZETIA) 10 MG tablet, Take 1 tablet by mouth Daily., Disp: 30 tablet, Rfl: 11  •  fluticasone (FLONASE) 50 MCG/ACT nasal spray, 2 sprays into the nostril(s) as directed by provider Daily., Disp: , Rfl:   •  levothyroxine (SYNTHROID, LEVOTHROID) 75 MCG tablet, TAKE ONE TABLET BY MOUTH DAILY, Disp: 90 tablet, Rfl: 0  •  LINZESS 72 MCG capsule capsule, , Disp: , Rfl:   •  Livalo 2 MG tablet tablet, , Disp: , Rfl:   •  losartan (COZAAR) 25 MG tablet, 50 mg., Disp: , Rfl:   •  sertraline (ZOLOFT) 100 MG tablet, TAKE ONE TABLET BY  "MOUTH DAILY, Disp: 30 tablet, Rfl: 2  •  traZODone (DESYREL) 50 MG tablet, , Disp: , Rfl:   •  vitamin C (ASCORBIC ACID) 250 MG tablet, Take 250 mg by mouth Daily., Disp: , Rfl:    also entered in Sleep Questionnaire    Patient  has a past medical history of Allergy desensitization therapy, Daytime sleepiness, Depression, Diverticulosis, Head trauma, History of colon polyps, Hyperlipidemia, Hypertension, Hypothyroidism, Irritable bowel syndrome, Multilevel degenerative disc disease, BALBINA on CPAP, Osteoarthritis, Sialodocholithiasis, Sleep apnea, Stroke (CMS/HCC), Thyroid disease, and TIA (transient ischemic attack) (03/2017).    I have reviewed the Past Medical History, Past Surgical History, Social History and Family History.    Vital Signs /69   Ht 157.5 cm (62.01\")   Wt 61.2 kg (135 lb)   SpO2 96%   BMI 24.69 kg/m²  Body mass index is 24.69 kg/m².    General Alert and oriented. No acute distress noted   Pharynx/Throat Class   Mallampati airway, large tongue, no evidence of redundant lateral pharyngeal tissue. No oral lesions. No thrush. Moist mucous membranes.   Head Normocephalic. Symmetrical. Atraumatic.    Nose No septal deviation. No drainage   Chest Wall Normal shape. Symmetric expansion with respiration. No tenderness.   Neck Trachea midline, no thyromegaly or adenopathy    Lungs Clear to auscultation bilaterally. No wheezes. No rhonchi. No rales. Respirations regular, even and unlabored.   Heart Regular rhythm and normal rate. Normal S1 and S2. No murmur   Abdomen Soft, non-tender and non-distended. Normal bowel sounds. No masses.   Extremities Moves all extremities well. No edema   Psychiatric Normal mood and affect.     Testing:  · Download 9/15/21-10/14/21 80% use with average nightly use of 4 hours and 42 minutes on auto CPAP 7-13cm with avg pressure of 9.2cm, AHI 3.7, leak 10 seconds.       Study:  5/4/17- Overnight split polysomnogram study.  Diagnostic study from 11:05 PM to 20 2 AM.  Sleep " efficiency 74% with 2.43 hours total sleep time.  Sleep distribution shows absence of slow-wave sleep and REM sleep.  AHI index overall was 19.7 with a respiratory disturbance index of 41.5.  In supine position AHI is 63 with RDI of 82.  Absence of REM sleep severity may be underestimated.  Oxygen saturation remained above 89%.  Arousal index 59.  PLM index 140.  PLM with arousal index 26.  Snoring for 5.5% of total sleep time.     Titration study from 2:22 AM to 5:59 AM.  Sleep efficiency was much poor at 45% on the titration portion.  There was again no REM sleep on the titration study.  PLM index was elevated at 38 with a PLM arousal index of 4.9 on the titration study.  CPAP was increased up to 17 cm water pressure.  Maximum sleep achieved at 15 cm though even then an ideal pressure could not be found.    Impression:  1. Obstructive sleep apnea          Plan:  Decrease CPAP to 5-12cm as she reports air leaks. She otherwise doing great on the CPAP and benefits. I discussed recent [employer] recall with patient. I explained the short and long term effects of polyester-based polyurethane(PE-PUR) foam degradation. I have also discussed with patient the consequences of untreated moderate-to severe BALBINA .Patient was asked to register her device with [employer] to see if it is affected by recent recall. If it is, the device needs to be replaced. Patient was given information regarding registration on [employer] website.    Patient uses the CPAP device and benefits from its use in terms of reduction of hypersomnia and snoring.  AHI appears to be within adequate range. I reviewed download report and original sleep study report with the patient.     Weight loss will be strongly beneficial to reduce the severity of sleep-disordered breathing.  Caution during activities that require prolonged concentration is strongly advised if sleepiness returns. Changing of PAP supplies regularly is important for effective use.     Follow up with  Dr. Samuel in 6 months    Thank you for allowing me to participate in your patient's care.      KAREN Soto  [facility]  Phone: [phone]      Part of this note may be an electronic transcription/translation of spoken language to printed text using the Dragon Dictation System.

## 2021-12-14 DIAGNOSIS — I63.81 CEREBROVASCULAR ACCIDENT (CVA) DUE TO STENOSIS OF SMALL ARTERY (HCC): ICD-10-CM

## 2021-12-14 RX ORDER — CLOPIDOGREL BISULFATE 75 MG/1
75 TABLET ORAL DAILY
Qty: 90 TABLET | Refills: 1 | Status: SHIPPED | OUTPATIENT
Start: 2021-12-14

## 2021-12-14 NOTE — TELEPHONE ENCOUNTER
Pt last seen 10/21/2020. Follow up scheduled 1/5/22.       Quantity 90 for 90 days.      Please review and approve or advise.     Thank you.

## 2022-03-17 ENCOUNTER — OFFICE VISIT (OUTPATIENT)
Dept: NEUROLOGY | Facility: CLINIC | Age: 82
End: 2022-03-17

## 2022-03-17 VITALS
WEIGHT: 136 LBS | BODY MASS INDEX: 25.03 KG/M2 | DIASTOLIC BLOOD PRESSURE: 82 MMHG | HEIGHT: 62 IN | HEART RATE: 71 BPM | SYSTOLIC BLOOD PRESSURE: 128 MMHG | OXYGEN SATURATION: 97 %

## 2022-03-17 DIAGNOSIS — I10 ESSENTIAL HYPERTENSION: ICD-10-CM

## 2022-03-17 DIAGNOSIS — G47.33 OBSTRUCTIVE SLEEP APNEA: ICD-10-CM

## 2022-03-17 DIAGNOSIS — Z78.9 STATIN INTOLERANCE: ICD-10-CM

## 2022-03-17 DIAGNOSIS — R41.89 SUBJECTIVE MEMORY COMPLAINTS: ICD-10-CM

## 2022-03-17 DIAGNOSIS — I67.9 CEREBROVASCULAR DISEASE: Primary | ICD-10-CM

## 2022-03-17 DIAGNOSIS — E78.00 HYPERCHOLESTEROLEMIA: ICD-10-CM

## 2022-03-17 PROCEDURE — 99215 OFFICE O/P EST HI 40 MIN: CPT | Performed by: NURSE PRACTITIONER

## 2022-03-17 RX ORDER — ESCITALOPRAM OXALATE 10 MG/1
10 TABLET ORAL DAILY
COMMUNITY
Start: 2021-12-06 | End: 2022-12-06

## 2022-03-17 RX ORDER — LOSARTAN POTASSIUM 100 MG/1
TABLET ORAL
COMMUNITY
Start: 2022-03-08

## 2022-03-17 NOTE — PROGRESS NOTES
"DOS: 3/17/2022  NAME: Sheila Burleson   : 1940  PCP: Desmond Zepeda MD    Chief Complaint   Patient presents with   • Stroke      SUBJECTIVE  Neurological Problem:  81 y.o. RHW female with presumed brainstem stroke, HTN, HLD, BALBINA (compliant with CPAP) and TIA (, ). She presents today for follow up. She is accompanied by her daughter..     Interval History:   **For previous detailed history, please see progress note dated 10/21/2020.    Ms. Burleson presents has a h/o presumed brainstem stroke she suffered in , maintained on Plavix and has known intolerance to statins.  Was last seen by me in the office in 2020.      She presents today, continues on Plavix and livalo, has not be able to tolerate statins previously. She denies any signs/symptoms of bleeding. She denies any new stroke/TIA symptoms. She continues to live independently, family very involved. She reports having some issues with \"memory\", stating she has difficulty coming up with words at times, \"forgetful\". She is still driving, no issues, no getting lost, no accidents or near misses. No tickets, Daughter has no issues with her driving. Sleep is good, no nighttime wanderings. No hallucinations.  Appetite is good, no recent weight loss, no problems swallowing, no choking.No changes in ambulation, No recent falls. Does not use any kind of assistive device. She denies any changes in her health. Review of recent labs in 2021 shows CMP and CBC were WNL, TSH was elevated at 7.52, subsequent level in Feb was 0.543; B12 was 518. Back in 2020, her total cholesterol 281, HDL 44, ,  . She is compliant with her CPAP. She does not smoke. Rare alcohol use. No changes in gait, no falls.     Review of Systems:Review of Systems   Constitutional: Negative for activity change, appetite change and fatigue.   HENT: Positive for tinnitus. Negative for ear pain and trouble swallowing.    Musculoskeletal: Negative for " back pain, gait problem and neck pain.   Neurological: Positive for headaches. Negative for dizziness, tremors, seizures, syncope, facial asymmetry, speech difficulty, weakness, light-headedness and numbness.   Psychiatric/Behavioral: Positive for confusion and decreased concentration. Negative for agitation, behavioral problems, dysphoric mood, hallucinations, self-injury, sleep disturbance and suicidal ideas. The patient is not nervous/anxious and is not hyperactive.     Above ROS reviewed    The following portions of the patient's history were reviewed and updated as appropriate: allergies, current medications, past family history, past medical history, past social history, past surgical history and problem list.    Current Medications:   Current Outpatient Medications:   •  alendronate (FOSAMAX) 70 MG tablet, TAKE 1 TABLET BY MOUTH ONCE WEEKLY BEFORE BREAKFAST, ON AN EMPTY STOMACH: REMAIN UPRIGHT FOR 30 MINUTES:TAKE WITH 8 OUNCES OF WATER, Disp: , Rfl:   •  clopidogrel (PLAVIX) 75 MG tablet, Take 1 tablet by mouth Daily., Disp: 90 tablet, Rfl: 1  •  Ergocalciferol (VITAMIN D2) 2000 UNITS tablet, Take by mouth daily., Disp: , Rfl:   •  escitalopram (LEXAPRO) 10 MG tablet, Take 10 mg by mouth Daily., Disp: , Rfl:   •  fluticasone (FLONASE) 50 MCG/ACT nasal spray, 2 sprays into the nostril(s) as directed by provider Daily., Disp: , Rfl:   •  levothyroxine (SYNTHROID, LEVOTHROID) 75 MCG tablet, TAKE ONE TABLET BY MOUTH DAILY, Disp: 90 tablet, Rfl: 0  •  LINZESS 72 MCG capsule capsule, , Disp: , Rfl:   •  Livalo 2 MG tablet tablet, , Disp: , Rfl:   •  losartan (COZAAR) 100 MG tablet, , Disp: , Rfl:   •  traZODone (DESYREL) 50 MG tablet, , Disp: , Rfl:   •  vitamin C (ASCORBIC ACID) 250 MG tablet, Take 250 mg by mouth Daily., Disp: , Rfl:   **I did not stop or change the above medications.  Patient's medication list was updated to reflect medications they have reported as currently taking, including medication changes  made by other providers.    OBJECTIVE  Vitals:    03/17/22 1051   BP: 128/82   Pulse: 71   SpO2: 97%     Body mass index is 24.87 kg/m².    Diagnostics:    Laboratory Results:         Lab Results   Component Value Date    WBC 5.70 12/06/2021    HGB 12.8 12/06/2021    HCT 41.6 12/06/2021    MCV 91.2 12/06/2021     12/06/2021     Lab Results   Component Value Date    GLUCOSE 90 02/27/2018    BUN 14 12/06/2021    CREATININE 0.8 12/06/2021    EGFRIFNONA 77 02/27/2018    EGFRIFAFRI 93 02/27/2018    BCR 18.0 12/06/2021    K 4.9 12/06/2021    CO2 24 12/06/2021    CALCIUM 10.2 12/06/2021    PROTENTOTREF 6.5 02/27/2018    ALBUMIN 4.4 12/06/2021    LABIL2 1.7 12/06/2021    AST 34 12/06/2021    ALT 14 12/06/2021     Lab Results   Component Value Date    HGBA1C 5.2 09/11/2020     Lab Results   Component Value Date    CHOL 229 (H) 02/08/2017     Lab Results   Component Value Date    HDL 44 (L) 09/11/2020    HDL 49 (L) 12/10/2019    HDL 51 02/27/2018     Lab Results   Component Value Date     (H) 09/11/2020     (H) 12/10/2019    LDL 36 02/27/2018     Lab Results   Component Value Date    TRIG 226 (H) 09/11/2020    TRIG 126 12/10/2019    TRIG 79 02/27/2018     No results found for: RPR  Lab Results   Component Value Date    TSH 0.543 02/21/2022     Lab Results   Component Value Date    [id number] 518 12/06/2021     Physical Exam:  GENERAL: NAD  HEENT: Normocephalic, atraumatic   COR: RRR  Resp: Even and unlabored  Extremities: Decreased ROM of knees, Left worse than right with mild edema.  Skin: No rashes, lesions or ulcers.  Psychiatric: Normal mood and affect.    Neurological:   MS: AO. Language normal. No neglect. Follows all commands.  CN: II-XII grossly normal  Motor: Normal strength and tone throughout except for decreased hip fleoxr strength bilaterally. NO tremor or rigidity.  Sensory: Intact to light touch in arms and legs  Station and Gait: Mild antalgic gait but otherwise normal gait and station.     Coordination: Normal finger to nose bilaterally    Impression/Plan:  1. Cerebrovascular disease, presumed brainstem stroke and TIA  Continue Plavix 75 mg  HLD -- Not well controlled on last labs but needs new studies, patient to f/u with PCP, again reviewed importance of cholesterol control, goal LDL < 70  HTN -- Well controlled on today's vitals, continue to monitor BP. Recommended to keep well-hydrated, avoid hypotension  BALBINA -- Continue compliance with CPAP  Secondary stroke prevention: Ideal targets for stroke prevention would be Blood pressure < 130/80; B12 > 500 TSH in normal range and LDL < 70; HbA1c < 6.5 and smoking cessation if applicable.  Call 911 for stroke symptoms    2. Subjective complaints of memory loss - stable, normal neurologic exam today. She will continue to monitor. Again reviewed importance of lifestyle modifications for cognitive health and aggressive control of vascular RFs as noted in #1. Can consider MoCA at next visit.     I spent a total of 45 minutes today in reviewing records, prior diagnostics, examination of patient including, counseling and educating patient regarding signs/symptoms of stroke and reviewing diagnostics, stroke prevention recommendations and discussion and documenting plan of care.            Diagnoses and all orders for this visit:    1. Cerebrovascular disease (Primary)    2. Essential hypertension    3. Hypercholesterolemia    4. Statin intolerance    5. Obstructive sleep apnea    6. Subjective memory complaints        Coding      Dictated using Dragon

## 2022-04-22 ENCOUNTER — APPOINTMENT (OUTPATIENT)
Dept: SLEEP MEDICINE | Facility: HOSPITAL | Age: 82
End: 2022-04-22

## 2022-05-13 ENCOUNTER — APPOINTMENT (OUTPATIENT)
Dept: SLEEP MEDICINE | Facility: HOSPITAL | Age: 82
End: 2022-05-13

## 2022-06-17 ENCOUNTER — OFFICE VISIT (OUTPATIENT)
Dept: SLEEP MEDICINE | Facility: HOSPITAL | Age: 82
End: 2022-06-17

## 2022-06-17 VITALS
DIASTOLIC BLOOD PRESSURE: 74 MMHG | WEIGHT: 138 LBS | HEART RATE: 70 BPM | HEIGHT: 62 IN | SYSTOLIC BLOOD PRESSURE: 150 MMHG | OXYGEN SATURATION: 95 % | BODY MASS INDEX: 25.4 KG/M2

## 2022-06-17 DIAGNOSIS — G47.33 OBSTRUCTIVE SLEEP APNEA: Primary | ICD-10-CM

## 2022-06-17 DIAGNOSIS — Z78.9 DIFFICULTY WITH CPAP USE: ICD-10-CM

## 2022-06-17 PROCEDURE — G0463 HOSPITAL OUTPT CLINIC VISIT: HCPCS

## 2022-06-17 NOTE — PROGRESS NOTES
Hypertension [facility] SLEEP MEDICINE  [address]    [facility] [id number] [phone]    PCP: Desmond Zepeda MD    Reason for visit:  Sleep disorders: BALBINA    Sheila is a 81 y.o.female who was seen in the Sleep Disorders Center today. Annual fu. She got updated device from [employer]. She sleeps from 10pm to 6am. She uses nasal cushions, comfortable and no leaks or dry mouth. Sometimes pulls it off at night. She is rested when she awakens. No EDS. She likes the new machine.  [place] Sleepiness Scale is dnc. Caffeine 1 per day. Alcohol 0 per week.    Sheila  reports that she has never smoked. She has never used smokeless tobacco.    Pertinent Positive Review of Systems of cough  Rest of Review of Systems was negative as recorded in Sleep Questionnaire.    Patient  has a past medical history of Allergy desensitization therapy, Daytime sleepiness, Depression, Diverticulosis, Head trauma, History of colon polyps, Hyperlipidemia, Hypertension, Hypothyroidism, Irritable bowel syndrome, Multilevel degenerative disc disease, BALBINA on CPAP, Osteoarthritis, Sialodocholithiasis, Sleep apnea, Stroke (HCC), Thyroid disease, and TIA (transient ischemic attack) (03/2017).     Current Medications:    Current Outpatient Medications:   •  alendronate (FOSAMAX) 70 MG tablet, TAKE 1 TABLET BY MOUTH ONCE WEEKLY BEFORE BREAKFAST, ON AN EMPTY STOMACH: REMAIN UPRIGHT FOR 30 MINUTES:TAKE WITH 8 OUNCES OF WATER, Disp: , Rfl:   •  clopidogrel (PLAVIX) 75 MG tablet, Take 1 tablet by mouth Daily., Disp: 90 tablet, Rfl: 1  •  Ergocalciferol (VITAMIN D2) 2000 UNITS tablet, Take by mouth daily., Disp: , Rfl:   •  escitalopram (LEXAPRO) 10 MG tablet, Take 10 mg by mouth Daily., Disp: , Rfl:   •  fluticasone (FLONASE) 50 MCG/ACT nasal spray, 2 sprays into the nostril(s) as directed by provider Daily., Disp: , Rfl:   •  levothyroxine (SYNTHROID, LEVOTHROID) 75 MCG tablet, TAKE ONE TABLET BY MOUTH DAILY, Disp: 90 tablet, Rfl:  "0  •  LINZESS 72 MCG capsule capsule, , Disp: , Rfl:   •  Livalo 2 MG tablet tablet, , Disp: , Rfl:   •  losartan (COZAAR) 100 MG tablet, , Disp: , Rfl:   •  traZODone (DESYREL) 50 MG tablet, , Disp: , Rfl:   •  vitamin C (ASCORBIC ACID) 250 MG tablet, Take 250 mg by mouth Daily., Disp: , Rfl:    also entered in Sleep Questionnaire         Vital Signs: /74   Pulse 70   Ht 157.5 cm (62\")   Wt 62.6 kg (138 lb)   SpO2 95%   BMI 25.24 kg/m²     Body mass index is 25.24 kg/m².       Tongue: Large       Dentition: good       Pharynx: Posterior pharyngeal pillars are wide   Mallampatti: III (soft and hard palate and base of uvula visible)        General: Alert. Cooperative. Well developed. No acute distress.             Head:  Normocephalic. Symmetrical. Atraumatic.              Nose: No septal deviation. No drainage.          Throat: No oral lesions. No thrush. Moist mucous membranes.    Chest Wall:  Normal shape. Symmetric expansion with respiration. No tenderness.             Neck:  Trachea midline.           Lungs:  Clear to auscultation bilaterally. No wheezes. No rhonchi. No rales. Respirations regular, even and unlabored.            Heart:  Regular rhythm and normal rate. Normal S1 and S2. No murmur.     Abdomen:  Soft, non-tender and non-distended. Normal bowel sounds. No masses.  Extremities:  Moves all extremities well. No edema.    Psychiatric: Normal mood and affect.    Diagnostic data available to date is as below and was reviewed on current visit:  5/4/17- Overnight split polysomnogram study.  Diagnostic study from 11:05 PM to 20 2 AM.  Sleep efficiency 74% with 2.43 hours total sleep time.  Sleep distribution shows absence of slow-wave sleep and REM sleep.  AHI index overall was 19.7 with a respiratory disturbance index of 41.5.  In supine position AHI is 63 with RDI of 82.  Absence of REM sleep severity may be underestimated.  Oxygen saturation remained above 89%.  Arousal index 59.  PLM index " 140.  PLM with arousal index 26.  Snoring for 5.5% of total sleep time.     Titration study from 2:22 AM to 5:59 AM.  Sleep efficiency was much poor at 45% on the titration portion.  There was again no REM sleep on the titration study.  PLM index was elevated at 38 with a PLM arousal index of 4.9 on the titration study.  CPAP was increased up to 17 cm water pressure.  Maximum sleep achieved at 15 cm though even then an ideal pressure could not be found.    Most current available usage data reviewed on 06/17/2022:  · 87% compliance average usage on days used is 4 hours 25 minutes AHI is 3.6 average pressure is 11 cm    DME Company: [employer]    Pres[facility] to [facility] [place] [facility] for replacement supplies as below:    nasal mask      Description Replacement    Nasal PILLOWS      A 7034 Nasal Pillows  every 3 mth    A 7033 Repl Nasal Pillows  2 per mth    Nasal MASK/CUSHION     x A 7034 Nasal Mask/Cushion  every 3 mth   x A 7032 Repl Nasal Mask/Cushion  2 per mth    Full Face MASK      A 7030 Full Face Mask  every 3 mth    A 7031 Repl Face Mask  1 per mth      A 4604 Heated Tubing  every 3 mth    A 7037 Standard Tubing  every 3 mth   x A 7035 Headgear  every 3 mth   x A 7046 Repl Humidifier Chamber  every 6 yrs   x A 7038 Disposable Filters  2 per mth   x A 7039 Non-disposable Filter  every 6 mth   x A 7036 Chin Strap  every 6 mth         No orders of the defined types were placed in this encounter.         Impression:  1. Obstructive sleep apnea    2. Difficulty with CPAP use        Plan:  Sheila is compliant. Change to 5-10, may keep her from pulling the mask off.  If she is still having trouble with the mask coming off at night, she was asked to give us a call.  Hopefully her usage will increase beyond a 4-1/2 hours at night.    I reiterated the importance of effective treatment of obstructive sleep apnea with PAP machine.  Cardiovascular health risks of untreated sleep apnea were again reviewed.  Patient was asked to remain cautious  if there is persistent hypersomnolence. The benefit of weight loss in reducing severity of obstructive sleep apnea was discussed.  Patient would benefit from adhering to a strict diet to achieve ideal BMI.     Change of PAP supplies regularly is important for effective use.  Change of cushion on the mask or plugs on nasal pillows along with disposable filters once every month and change of mask frame, tubing, headgear and Velcro straps every 6 months at the minimum was reiterated.    This patient is compliant with PAP machine and benefits from its use.  Apnea hypopneas index is corrected/improved.  Daytime hypersomnolence has resolved.     Patient will follow up in this clinic in 1 year APRN    Thank you for allowing me to participate in your patient's care.    Electronically signed by Adrián Samuel MD, 06/17/22, 12:42 PM EDT.    Part of this note may be an electronic transcription/translation of spoken language to printed text using the Dragon Dictation System.   Type 2 diabetes mellitus with hyperglycemia Type 2 diabetes mellitus with hyperglycemia

## 2023-04-28 ENCOUNTER — TELEPHONE (OUTPATIENT)
Dept: SLEEP MEDICINE | Facility: HOSPITAL | Age: 83
End: 2023-04-28
Payer: MEDICARE

## 2023-06-05 NOTE — PROGRESS NOTES
CC: stroke f/u    HPI:  Sheila Burleson is a  82 y.o.  right-handed female with HTN, HLD, BALBINA compliant with CPAP, hypothyroidism, and history of stroke who is being seen in follow-up today for stroke.  In February 2017 the patient presented to [facility] with complaints of horizontal diplopia and balance issues.  She was initially discharged home on meclizine however symptoms recurred and she was admitted for stroke work-up.  She was evaluated by Dr. Arizmendi who reported exam was highly suggestive of right I and O.  She was presumed to have a right brainstem infarct despite negative MRI x3.  MRA head and neck were also negative for any significant stenosis.  Symptoms lasted less than 24 hours and resolved completely.  She was on aspirin prior to that episode and was switched to Plavix at that time.  She had an outpatient prolonged cardiac monitoring which was negative for any evidence of A-fib.  She does have a longstanding history of statin intolerance but has been able to tolerate Livalo.    Today she notes ongoing compliance with her CPAP.  She continues to take Plavix and Livalo without any significant side effects.  No TIA or stroke symptoms in the last year.  No falls in the last year or complaints of balance issues.  Of note she has previously complained of some memory problems but feels like her memory is on par with others her age.  She denies any issues getting lost while driving, car accidents, cooking accidents, or difficulty managing finances.  She feels her memory is stable compared to last year.    She does not smoke, rarely drinks alcohol.  She lives alone.  She states she used to walk for exercises but is no longer able to do this due to left knee osteoarthritis; she has been advised she will need a surgery for this.    Past Medical History:   Diagnosis Date    Allergy desensitization therapy     gets shot weekly    Daytime sleepiness     Depression     Diverticulosis     Head trauma      History of colon polyps     Hyperlipidemia     Hypertension     Hypothyroidism     Irritable bowel syndrome     Multilevel degenerative disc disease     BALBINA on CPAP     Osteoarthritis     Sialodocholithiasis     Sleep apnea     Stroke     Thyroid disease     TIA (transient ischemic attack) 03/2017         Past Surgical History:   Procedure Laterality Date    APPENDECTOMY      CATARACT EXTRACTION W/ INTRAOCULAR LENS  IMPLANT, BILATERAL      CHOLECYSTECTOMY      COLON SURGERY      COLONOSCOPY  2012    RECALL JAN 2018    COLONOSCOPY N/A 12/12/2017    Procedure: COLONOSCOPY into cecum;  Surgeon: Dominic Sandoval MD;  Location: [facility] ENDOSCOPY;  Service:     COLOSTOMY CLOSURE      HERNIA REPAIR      HYSTERECTOMY      TUBAL ABDOMINAL LIGATION      UPPER GASTROINTESTINAL ENDOSCOPY             Current Outpatient Medications:     Azelastine HCl 137 MCG/SPRAY solution, , Disp: , Rfl:     clopidogrel (PLAVIX) 75 MG tablet, Take 1 tablet by mouth Daily., Disp: 90 tablet, Rfl: 1    Ergocalciferol (VITAMIN D2) 2000 UNITS tablet, Take by mouth daily., Disp: , Rfl:     fluticasone (FLONASE) 50 MCG/ACT nasal spray, 2 sprays into the nostril(s) as directed by provider Daily., Disp: , Rfl:     levothyroxine (SYNTHROID, LEVOTHROID) 75 MCG tablet, TAKE ONE TABLET BY MOUTH DAILY, Disp: 90 tablet, Rfl: 0    LINZESS 72 MCG capsule capsule, , Disp: , Rfl:     Livalo 2 MG tablet tablet, , Disp: , Rfl:     losartan (COZAAR) 100 MG tablet, , Disp: , Rfl:     montelukast (SINGULAIR) 10 MG tablet, , Disp: , Rfl:     traZODone (DESYREL) 50 MG tablet, , Disp: , Rfl:     vitamin C (ASCORBIC ACID) 250 MG tablet, Take 1 tablet by mouth Daily., Disp: , Rfl:     alendronate (FOSAMAX) 70 MG tablet, TAKE 1 TABLET BY MOUTH ONCE WEEKLY BEFORE BREAKFAST, ON AN EMPTY STOMACH: REMAIN UPRIGHT FOR 30 MINUTES:TAKE WITH 8 OUNCES OF WATER (Patient not taking: Reported on 6/9/2023), Disp: , Rfl:     escitalopram (LEXAPRO) 10 MG tablet, Take 10 mg by mouth Daily.,  "Disp: , Rfl:       Family History   Problem Relation Age of Onset    Stroke Mother     Hypertension Mother     Thyroid disease Mother     Heart disease Father     Arthritis Father          Social History     Socioeconomic History    Marital status: Single   Tobacco Use    Smoking status: Never    Smokeless tobacco: Never   Substance and Sexual Activity    Alcohol use: No    Drug use: No    Sexual activity: Never         Allergies   Allergen Reactions    Amoxicillin-Pot Clavulanate Diarrhea     Gi upset     Clarithromycin Nausea Only    Levofloxacin Nausea Only    Mirapex [Pramipexole] Nausea Only    Pollen Extract     Pravastatin Unknown - High Severity     Muscle cramps         Physical Exam:  Vitals:    06/09/23 0849   BP: 130/82   Pulse: 67   SpO2: 96%   Weight: 62.6 kg (137 lb 14.4 oz)   Height: 157.5 cm (62\")     Orthostatic BP:    Body mass index is 25.22 kg/m².    General appearance: Well developed, well nourished, well groomed, alert and cooperative.   HEENT: Normocephalic.   Neck and spine: Normal alignment.   Cardiac: Regular rate and rhythm. No murmurs.   Chest Exam: Clear to auscultation bilaterally, no wheezes, no rhonchi.  Extremities: Normal, no edema.   Skin: No rashes or birthmarks.     Higher integrative function: Oriented to month/year, place, person, intact recent and remote memory, attention span, concentration and language. Spontaneous speech, fund of vocabulary are normal.   CN II: Normal visual fields.   CN III IV VI: Extraocular movements are full without nystagmus. Pupils are equal, round, and reactive to light.   CN V: Normal facial sensation.  CN VII: Facial movements are symmetric, no weakness.   CN VIII: Auditory acuity is normal.   CN IX & X: Symmetric palatal movement.   CN XI: Sternocleidomastoid and trapezius are normal. No weakness.   CN XII: The tongue is midline. No atrophy or fasciculations.   Motor: Normal muscle strength, bulk, and tone in upper and lower extremities. No " fasciculations, rigidity, spasticity or abnormal movements.   Sensation: Normal light touch x4.  Station and gait: Antalgic gait.  Coordination: Finger to nose test showed no dysmetria. Rapid alternating movements were normal. Heel to shin normal.       Results:      Lab Results   Component Value Date    GLUCOSE 90 02/27/2018    BUN 14 12/06/2021    CREATININE 0.8 12/06/2021    EGFRIFNONA 77 02/27/2018    EGFRIFAFRI 93 02/27/2018    BCR 18.0 12/06/2021    CO2 24 12/06/2021    CALCIUM 10.2 12/06/2021    PROTENTOTREF 6.5 02/27/2018    ALBUMIN 4.4 12/06/2021    LABIL2 1.7 12/06/2021    AST 34 12/06/2021    ALT 14 12/06/2021       Lab Results   Component Value Date    WBC 5.70 12/06/2021    HGB 12.8 12/06/2021    HCT 41.6 12/06/2021    MCV 91.2 12/06/2021     12/06/2021         .No results found for: RPR      Lab Results   Component Value Date    TSH 3.880 06/06/2022    U0FQALO 8.1 07/30/2015         Lab Results   Component Value Date    [id number] 518 12/06/2021         No results found for: FOLATE      Lab Results   Component Value Date    HGBA1C 5.2 09/11/2020         Lab Results   Component Value Date    GLUCOSE 90 02/27/2018    BUN 14 12/06/2021    CREATININE 0.8 12/06/2021    EGFRIFNONA 77 02/27/2018    EGFRIFAFRI 93 02/27/2018    BCR 18.0 12/06/2021    K 4.9 12/06/2021    CO2 24 12/06/2021    CALCIUM 10.2 12/06/2021    PROTENTOTREF 6.5 02/27/2018    ALBUMIN 4.4 12/06/2021    LABIL2 1.7 12/06/2021    AST 34 12/06/2021    ALT 14 12/06/2021         Lab Results   Component Value Date    WBC 5.70 12/06/2021    HGB 12.8 12/06/2021    HCT 41.6 12/06/2021    MCV 91.2 12/06/2021     12/06/2021             Assessment/Plan:        Diagnoses and all orders for this visit:    1. History of stroke (Primary)     Continue Plavix 75 mg daily  Blood pressure control to <130/80   Goal LDL <70, history of statin intolerance, continue Livalo at current dose    Serum glucose < 140   Call 911 for stroke any stroke  symptoms   Follow-up in 1 year or sooner if needed      Greater than 30 minutes spent in review of the chart, face-to-face time with the patient, and documentation.            Dictated utilizing Dragon dictation.

## 2023-06-09 ENCOUNTER — OFFICE VISIT (OUTPATIENT)
Dept: NEUROLOGY | Facility: CLINIC | Age: 83
End: 2023-06-09
Payer: MEDICARE

## 2023-06-09 VITALS
BODY MASS INDEX: 25.38 KG/M2 | HEART RATE: 67 BPM | OXYGEN SATURATION: 96 % | DIASTOLIC BLOOD PRESSURE: 82 MMHG | SYSTOLIC BLOOD PRESSURE: 130 MMHG | HEIGHT: 62 IN | WEIGHT: 137.9 LBS

## 2023-06-09 DIAGNOSIS — Z86.73 HISTORY OF STROKE: Primary | ICD-10-CM

## 2023-06-09 PROBLEM — M85.852 OSTEOPENIA OF BOTH HIPS: Status: ACTIVE | Noted: 2020-01-16

## 2023-06-09 PROBLEM — N28.89 LEFT RENAL MASS: Status: ACTIVE | Noted: 2020-07-01

## 2023-06-09 PROBLEM — H53.2 DIPLOPIA: Status: RESOLVED | Noted: 2017-02-07 | Resolved: 2023-06-09

## 2023-06-09 PROBLEM — I63.9 ACUTE CVA (CEREBROVASCULAR ACCIDENT): Status: RESOLVED | Noted: 2017-02-07 | Resolved: 2023-06-09

## 2023-06-09 PROBLEM — G44.52 NEW DAILY PERSISTENT HEADACHE: Status: RESOLVED | Noted: 2017-08-31 | Resolved: 2023-06-09

## 2023-06-09 PROBLEM — J30.1 SEASONAL ALLERGIC RHINITIS DUE TO POLLEN: Status: ACTIVE | Noted: 2019-07-10

## 2023-06-09 PROBLEM — M85.851 OSTEOPENIA OF BOTH HIPS: Status: ACTIVE | Noted: 2020-01-16

## 2023-06-09 RX ORDER — MONTELUKAST SODIUM 10 MG/1
TABLET ORAL
COMMUNITY
Start: 2023-03-13

## 2023-06-09 RX ORDER — AZELASTINE HYDROCHLORIDE 137 UG/1
SPRAY, METERED NASAL
COMMUNITY
Start: 2023-02-21

## 2023-06-09 NOTE — LETTER
June 9, 2023       No Recipients    Patient: Sheila Burleson   YOB: 1940   Date of Visit: 6/9/2023       Dear Dr. Garcia Recipients:    Thank you for referring Sheila Burleson to me for evaluation. Below are the relevant portions of my assessment and plan of care.    If you have questions, please do not hesitate to call me. I look forward to following Sheila along with you.         Sincerely,        KAREN Watson        CC:   No Recipients    Holly Adames APRN  06/09/23 0945  Signed  CC: stroke f/u    HPI:  Sheila Burleson is a  82 y.o.  right-handed female with HTN, HLD, BALBINA compliant with CPAP, hypothyroidism, and history of stroke who is being seen in follow-up today for stroke.  In February 2017 the patient presented to [facility] with complaints of horizontal diplopia and balance issues.  She was initially discharged home on meclizine however symptoms recurred and she was admitted for stroke work-up.  She was evaluated by Dr. Arizmendi who reported exam was highly suggestive of right I and O.  She was presumed to have a right brainstem infarct despite negative MRI x3.  MRA head and neck were also negative for any significant stenosis.  Symptoms lasted less than 24 hours and resolved completely.  She was on aspirin prior to that episode and was switched to Plavix at that time.  She had an outpatient prolonged cardiac monitoring which was negative for any evidence of A-fib.  She does have a longstanding history of statin intolerance but has been able to tolerate Livalo.    Today she notes ongoing compliance with her CPAP.  She continues to take Plavix and Livalo without any significant side effects.  No TIA or stroke symptoms in the last year.  No falls in the last year or complaints of balance issues.  Of note she has previously complained of some memory problems but feels like her memory is on par with others her age.  She denies any issues getting lost while driving, car  accidents, cooking accidents, or difficulty managing finances.  She feels her memory is stable compared to last year.    She does not smoke, rarely drinks alcohol.  She lives alone.  She states she used to walk for exercises but is no longer able to do this due to left knee osteoarthritis; she has been advised she will need a surgery for this.    Past Medical History:   Diagnosis Date   • Allergy desensitization therapy     gets shot weekly   • Daytime sleepiness    • Depression    • Diverticulosis    • Head trauma    • History of colon polyps    • Hyperlipidemia    • Hypertension    • Hypothyroidism    • Irritable bowel syndrome    • Multilevel degenerative disc disease    • BALBINA on CPAP    • Osteoarthritis    • Sialodocholithiasis    • Sleep apnea    • Stroke    • Thyroid disease    • TIA (transient ischemic attack) 03/2017         Past Surgical History:   Procedure Laterality Date   • APPENDECTOMY     • CATARACT EXTRACTION W/ INTRAOCULAR LENS  IMPLANT, BILATERAL     • CHOLECYSTECTOMY     • COLON SURGERY     • COLONOSCOPY  2012    RECALL JAN 2018   • COLONOSCOPY N/A 12/12/2017    Procedure: COLONOSCOPY into cecum;  Surgeon: Dominic Sandoval MD;  Location: [facility] ENDOSCOPY;  Service:    • COLOSTOMY CLOSURE     • HERNIA REPAIR     • HYSTERECTOMY     • TUBAL ABDOMINAL LIGATION     • UPPER GASTROINTESTINAL ENDOSCOPY             Current Outpatient Medications:   •  Azelastine HCl 137 MCG/SPRAY solution, , Disp: , Rfl:   •  clopidogrel (PLAVIX) 75 MG tablet, Take 1 tablet by mouth Daily., Disp: 90 tablet, Rfl: 1  •  Ergocalciferol (VITAMIN D2) 2000 UNITS tablet, Take by mouth daily., Disp: , Rfl:   •  fluticasone (FLONASE) 50 MCG/ACT nasal spray, 2 sprays into the nostril(s) as directed by provider Daily., Disp: , Rfl:   •  levothyroxine (SYNTHROID, LEVOTHROID) 75 MCG tablet, TAKE ONE TABLET BY MOUTH DAILY, Disp: 90 tablet, Rfl: 0  •  LINZESS 72 MCG capsule capsule, , Disp: , Rfl:   •  Livalo 2 MG tablet tablet, , Disp:  ", Rfl:   •  losartan (COZAAR) 100 MG tablet, , Disp: , Rfl:   •  montelukast (SINGULAIR) 10 MG tablet, , Disp: , Rfl:   •  traZODone (DESYREL) 50 MG tablet, , Disp: , Rfl:   •  vitamin C (ASCORBIC ACID) 250 MG tablet, Take 1 tablet by mouth Daily., Disp: , Rfl:   •  alendronate (FOSAMAX) 70 MG tablet, TAKE 1 TABLET BY MOUTH ONCE WEEKLY BEFORE BREAKFAST, ON AN EMPTY STOMACH: REMAIN UPRIGHT FOR 30 MINUTES:TAKE WITH 8 OUNCES OF WATER (Patient not taking: Reported on 6/9/2023), Disp: , Rfl:   •  escitalopram (LEXAPRO) 10 MG tablet, Take 10 mg by mouth Daily., Disp: , Rfl:       Family History   Problem Relation Age of Onset   • Stroke Mother    • Hypertension Mother    • Thyroid disease Mother    • Heart disease Father    • Arthritis Father          Social History     Socioeconomic History   • Marital status: Single   Tobacco Use   • Smoking status: Never   • Smokeless tobacco: Never   Substance and Sexual Activity   • Alcohol use: No   • Drug use: No   • Sexual activity: Never         Allergies   Allergen Reactions   • Amoxicillin-Pot Clavulanate Diarrhea     Gi upset    • Clarithromycin Nausea Only   • Levofloxacin Nausea Only   • Mirapex [Pramipexole] Nausea Only   • Pollen Extract    • Pravastatin Unknown - High Severity     Muscle cramps         Physical Exam:  Vitals:    06/09/23 0849   BP: 130/82   Pulse: 67   SpO2: 96%   Weight: 62.6 kg (137 lb 14.4 oz)   Height: 157.5 cm (62\")     Orthostatic BP:    Body mass index is 25.22 kg/m².    General appearance: Well developed, well nourished, well groomed, alert and cooperative.   HEENT: Normocephalic.   Neck and spine: Normal alignment.   Cardiac: Regular rate and rhythm. No murmurs.   Chest Exam: Clear to auscultation bilaterally, no wheezes, no rhonchi.  Extremities: Normal, no edema.   Skin: No rashes or birthmarks.     Higher integrative function: Oriented to month/year, place, person, intact recent and remote memory, attention span, concentration and language. " Spontaneous speech, fund of vocabulary are normal.   CN II: Normal visual fields.   CN III IV VI: Extraocular movements are full without nystagmus. Pupils are equal, round, and reactive to light.   CN V: Normal facial sensation.  CN VII: Facial movements are symmetric, no weakness.   CN VIII: Auditory acuity is normal.   CN IX & X: Symmetric palatal movement.   CN XI: Sternocleidomastoid and trapezius are normal. No weakness.   CN XII: The tongue is midline. No atrophy or fasciculations.   Motor: Normal muscle strength, bulk, and tone in upper and lower extremities. No fasciculations, rigidity, spasticity or abnormal movements.   Sensation: Normal light touch x4.  Station and gait: Antalgic gait.  Coordination: Finger to nose test showed no dysmetria. Rapid alternating movements were normal. Heel to shin normal.       Results:      Lab Results   Component Value Date    GLUCOSE 90 02/27/2018    BUN 14 12/06/2021    CREATININE 0.8 12/06/2021    EGFRIFNONA 77 02/27/2018    EGFRIFAFRI 93 02/27/2018    BCR 18.0 12/06/2021    CO2 24 12/06/2021    CALCIUM 10.2 12/06/2021    PROTENTOTREF 6.5 02/27/2018    ALBUMIN 4.4 12/06/2021    LABIL2 1.7 12/06/2021    AST 34 12/06/2021    ALT 14 12/06/2021       Lab Results   Component Value Date    WBC 5.70 12/06/2021    HGB 12.8 12/06/2021    HCT 41.6 12/06/2021    MCV 91.2 12/06/2021     12/06/2021         .No results found for: RPR      Lab Results   Component Value Date    TSH 3.880 06/06/2022    L1EVOEV 8.1 07/30/2015         Lab Results   Component Value Date    [id number] 518 12/06/2021         No results found for: FOLATE      Lab Results   Component Value Date    HGBA1C 5.2 09/11/2020         Lab Results   Component Value Date    GLUCOSE 90 02/27/2018    BUN 14 12/06/2021    CREATININE 0.8 12/06/2021    EGFRIFNONA 77 02/27/2018    EGFRIFAFRI 93 02/27/2018    BCR 18.0 12/06/2021    K 4.9 12/06/2021    CO2 24 12/06/2021    CALCIUM 10.2 12/06/2021    PROTENTOTREF 6.5  02/27/2018    ALBUMIN 4.4 12/06/2021    LABIL2 1.7 12/06/2021    AST 34 12/06/2021    ALT 14 12/06/2021         Lab Results   Component Value Date    WBC 5.70 12/06/2021    HGB 12.8 12/06/2021    HCT 41.6 12/06/2021    MCV 91.2 12/06/2021     12/06/2021             Assessment/Plan:        Diagnoses and all orders for this visit:    1. History of stroke (Primary)     Continue Plavix 75 mg daily  Blood pressure control to <130/80   Goal LDL <70, history of statin intolerance, continue Livalo at current dose    Serum glucose < 140   Call 911 for stroke any stroke symptoms   Follow-up in 1 year or sooner if needed      Greater than 30 minutes spent in review of the chart, face-to-face time with the patient, and documentation.            Dictated utilizing Dragon dictation.

## 2024-06-06 NOTE — PROGRESS NOTES
CC: Stroke follow-up    HPI:  Sheila Burleson is a  83 y.o.  right-handed female with hypertension, hyperlipidemia, BALBINA not currently using CPAP, hypothyroidism, and history of stroke is being seen follow-up today for stroke.  She was last seen by me in June 2023.     In February 2017 the patient presented to [facility] with complaints of horizontal diplopia and balance issues.  She was initially discharged home on meclizine however symptoms recurred and she was admitted for stroke work-up.  She was evaluated by Dr. Arizmendi who reported exam was highly suggestive of right ARIAN.  She was presumed to have a right brainstem infarct despite negative MRI x3.  MRA head and neck were also negative for any significant stenosis.  Symptoms lasted less than 24 hours and resolved completely.  She was on aspirin prior to that episode and was switched to Plavix at that time.  She had an outpatient prolonged cardiac monitoring which was negative for any evidence of A-fib.  She does have a longstanding history of statin intolerance but has been able to tolerate Livalo.    Today she denies any TIA or stroke symptoms in the last year.  No significant changes to her medical history or hospitalizations in the last year.  She continues to take Plavix and Livalo.  No falls in the last year or significant changes to memory.  She does report that she has had increased difficulty tolerating CPAP and has an upcoming appointment with pulmonology to discuss this.     In November 2023LDL was 128, HDL 45, triglycerides 166, total cholesterol 206, CMP was unremarkable aside from calcium of 10.6.     Past Medical History:   Diagnosis Date    Allergy desensitization therapy     gets shot weekly    Daytime sleepiness     Depression     Diverticulosis     Head trauma     History of colon polyps     Hyperlipidemia     Hypertension     Hypothyroidism     Irritable bowel syndrome     Multilevel degenerative disc disease     BALBINA on CPAP      Osteoarthritis     Sialodocholithiasis     Sleep apnea     Stroke     Thyroid disease     TIA (transient ischemic attack) 03/2017         Past Surgical History:   Procedure Laterality Date    APPENDECTOMY      CATARACT EXTRACTION W/ INTRAOCULAR LENS  IMPLANT, BILATERAL      CHOLECYSTECTOMY      COLON SURGERY      COLONOSCOPY  2012    RECALL JAN 2018    COLONOSCOPY N/A 12/12/2017    Procedure: COLONOSCOPY into cecum;  Surgeon: Dominic Sandoval MD;  Location: [facility] ENDOSCOPY;  Service:     COLOSTOMY CLOSURE      HERNIA REPAIR      HYSTERECTOMY      TUBAL ABDOMINAL LIGATION      UPPER GASTROINTESTINAL ENDOSCOPY             Current Outpatient Medications:     Azelastine HCl 137 MCG/SPRAY solution, , Disp: , Rfl:     celecoxib (CeleBREX) 200 MG capsule, Take 1 capsule by mouth., Disp: , Rfl:     clopidogrel (PLAVIX) 75 MG tablet, Take 1 tablet by mouth Daily., Disp: 90 tablet, Rfl: 1    Diclofenac Sodium (VOLTAREN) 1 % gel gel, Apply 2 g topically to the appropriate area as directed 4 (Four) Times a Day., Disp: , Rfl:     Ergocalciferol (VITAMIN D2) 2000 UNITS tablet, Take by mouth daily., Disp: , Rfl:     famotidine (PEPCID) 40 MG tablet, Take 1 tablet by mouth Daily., Disp: , Rfl:     fluticasone (FLONASE) 50 MCG/ACT nasal spray, 2 sprays into the nostril(s) as directed by provider Daily., Disp: , Rfl:     levothyroxine (SYNTHROID, LEVOTHROID) 75 MCG tablet, TAKE ONE TABLET BY MOUTH DAILY, Disp: 90 tablet, Rfl: 0    LINZESS 72 MCG capsule capsule, , Disp: , Rfl:     Livalo 2 MG tablet tablet, , Disp: , Rfl:     losartan (COZAAR) 100 MG tablet, , Disp: , Rfl:     montelukast (SINGULAIR) 10 MG tablet, , Disp: , Rfl:     traZODone (DESYREL) 50 MG tablet, , Disp: , Rfl:     vitamin C (ASCORBIC ACID) 250 MG tablet, Take 1 tablet by mouth Daily., Disp: , Rfl:     alendronate (FOSAMAX) 70 MG tablet, TAKE 1 TABLET BY MOUTH ONCE WEEKLY BEFORE BREAKFAST, ON AN EMPTY STOMACH: REMAIN UPRIGHT FOR 30 MINUTES:TAKE WITH 8 OUNCES OF  "WATER (Patient not taking: Reported on 6/9/2023), Disp: , Rfl:     escitalopram (LEXAPRO) 10 MG tablet, Take 10 mg by mouth Daily., Disp: , Rfl:       Family History   Problem Relation Age of Onset    Stroke Mother     Hypertension Mother     Thyroid disease Mother     Heart disease Father     Arthritis Father          Social History     Socioeconomic History    Marital status: Single   Tobacco Use    Smoking status: Never    Smokeless tobacco: Never   Vaping Use    Vaping status: Never Used   Substance and Sexual Activity    Alcohol use: No    Drug use: No    Sexual activity: Never         Allergies   Allergen Reactions    Alendronate Myalgia     myalgia    Amoxicillin-Pot Clavulanate Diarrhea     Gi upset     Clarithromycin Nausea Only    Levofloxacin Nausea Only    Mirapex [Pramipexole] Nausea Only    Pollen Extract     Pravastatin Unknown - High Severity     Muscle cramps             Physical Exam:  Vitals:    06/11/24 0913   BP: 120/72   Pulse: 70   SpO2: 96%   Weight: 62.7 kg (138 lb 3.2 oz)   Height: 157.5 cm (62\")     Orthostatic BP:    Body mass index is 25.28 kg/m².    General appearance: Well developed, well nourished, well groomed, alert and cooperative.   HEENT: Normocephalic.   Cardiac: Regular rate and rhythm. No murmurs.   Peripheral Vasculature: Radial pulses are equal and symmetric.  Chest Exam: Clear to auscultation bilaterally, no wheezes, no rhonchi.  Extremities: Normal, no edema.   Skin: No rashes or birthmarks.     Higher integrative function: Oriented to time, place, person, intact recent and remote memory, attention span, concentration and language. Spontaneous speech, fund of vocabulary are normal.   CN II: Normal  visual fields.   CN III IV VI: Extraocular movements are full without nystagmus. Pupils are equal, round, and reactive to light.   CN V: Normal facial sensation.  CN VII: Facial movements are symmetric, no weakness.   CN VIII: Auditory acuity is normal.   CN IX & X: Symmetric " "palatal movement.   CN XI: Sternocleidomastoid and trapezius are normal. No weakness.   CN XII: The tongue is midline. No atrophy or fasciculations.   Motor: Normal muscle strength, bulk, and tone in upper and lower extremities. No fasciculations, rigidity, spasticity or abnormal movements.   Sensation: Normal light touch in all 4 extremities  Station and gait: Comes to stand easily, mildly antalgic gait.  Coordination: Finger to nose test showed no dysmetria. Heel to shin normal.       Results:      Lab Results   Component Value Date    GLUCOSE 90 02/27/2018    BUN 15 03/03/2023    CREATININE 0.83 03/03/2023    EGFRIFNONA 77 02/27/2018    EGFRIFAFRI >60 03/03/2023    BCR 18.1 03/03/2023    CO2 25 03/03/2023    CALCIUM 9.7 03/03/2023    PROTENTOTREF 6.5 02/27/2018    ALBUMIN 4.1 03/03/2023    LABIL2 1.6 03/03/2023    AST 21 03/03/2023    ALT 15 03/03/2023       Lab Results   Component Value Date    WBC 4.99 11/28/2023    HGB 13.9 11/28/2023    HCT 43.0 11/28/2023    MCV 90.9 11/28/2023     11/28/2023         .No results found for: \"RPR\"      Lab Results   Component Value Date    TSH 3.880 06/06/2022    X9TESFG 8.1 07/30/2015    THYROIDAB 266.90 (H) 10/30/2023         Lab Results   Component Value Date    NLBCPVCZ96 373 04/01/2024         No results found for: \"FOLATE\"      Lab Results   Component Value Date    HGBA1C 5.6 11/30/2023         Lab Results   Component Value Date    GLUCOSE 90 02/27/2018    BUN 15 03/03/2023    CREATININE 0.83 03/03/2023    EGFRIFNONA 77 02/27/2018    EGFRIFAFRI >60 03/03/2023    BCR 18.1 03/03/2023    K 4.3 03/03/2023    CO2 25 03/03/2023    CALCIUM 9.7 03/03/2023    PROTENTOTREF 6.5 02/27/2018    ALBUMIN 4.1 03/03/2023    LABIL2 1.6 03/03/2023    AST 21 03/03/2023    ALT 15 03/03/2023         Lab Results   Component Value Date    WBC 4.99 11/28/2023    HGB 13.9 11/28/2023    HCT 43.0 11/28/2023    MCV 90.9 11/28/2023     11/28/2023             Assessment/Plan:   "      Diagnoses and all orders for this visit:    1. History of stroke (Primary)    2. Mixed hyperlipidemia    She has a was seen today for stroke follow-up.  No TIA or stroke symptoms in the last year.  Since I last saw her she has stopped using her CPAP but has an upcoming appointment with pulmonology to address issues.  BP appears well-controlled however hyperlipidemia not at goal but she has a history of statin intolerance   Continue Plavix 75 mg daily  Blood pressure control to <130/80   Goal LDL <70-continue Livalo at current dose, intolerant to other statins   Serum glucose < 140   Call 911 for stroke any stroke symptoms   Encouraged regular CPAP use   Follow-up in 1 year or as needed      Total time: 24 minutes            Dictated utilizing Dragon dictation.

## 2024-06-11 ENCOUNTER — OFFICE VISIT (OUTPATIENT)
Dept: NEUROLOGY | Facility: CLINIC | Age: 84
End: 2024-06-11
Payer: MEDICARE

## 2024-06-11 VITALS
HEART RATE: 70 BPM | OXYGEN SATURATION: 96 % | HEIGHT: 62 IN | SYSTOLIC BLOOD PRESSURE: 120 MMHG | DIASTOLIC BLOOD PRESSURE: 72 MMHG | WEIGHT: 138.2 LBS | BODY MASS INDEX: 25.43 KG/M2

## 2024-06-11 DIAGNOSIS — E78.2 MIXED HYPERLIPIDEMIA: ICD-10-CM

## 2024-06-11 DIAGNOSIS — Z86.73 HISTORY OF STROKE: Primary | ICD-10-CM

## 2024-06-11 PROBLEM — Z78.9 INTOLERANCE OF CONTINUOUS POSITIVE AIRWAY PRESSURE (CPAP) VENTILATION: Status: ACTIVE | Noted: 2019-07-03

## 2024-06-11 PROCEDURE — 1160F RVW MEDS BY RX/DR IN RCRD: CPT | Performed by: NURSE PRACTITIONER

## 2024-06-11 PROCEDURE — 3074F SYST BP LT 130 MM HG: CPT | Performed by: NURSE PRACTITIONER

## 2024-06-11 PROCEDURE — 3078F DIAST BP <80 MM HG: CPT | Performed by: NURSE PRACTITIONER

## 2024-06-11 PROCEDURE — 99213 OFFICE O/P EST LOW 20 MIN: CPT | Performed by: NURSE PRACTITIONER

## 2024-06-11 PROCEDURE — 1159F MED LIST DOCD IN RCRD: CPT | Performed by: NURSE PRACTITIONER

## 2024-06-11 RX ORDER — CELECOXIB 200 MG/1
200 CAPSULE ORAL
COMMUNITY
Start: 2024-05-21 | End: 2025-05-21

## 2024-06-11 RX ORDER — FAMOTIDINE 40 MG/1
40 TABLET, FILM COATED ORAL DAILY
COMMUNITY
Start: 2024-05-14 | End: 2025-05-14

## 2024-06-11 NOTE — LETTER
June 11, 2024       No Recipients    Patient: Sheila Burleson   YOB: 1940   Date of Visit: 6/11/2024     Dear Desmond Zepeda MD:       Thank you for referring Sheila Burleson to me for evaluation. Below are the relevant portions of my assessment and plan of care.    If you have questions, please do not hesitate to call me. I look forward to following Sheila along with you.         Sincerely,        KAREN Watson        CC:   No Recipients    Holly Adames APRN  06/11/24 1007  Sign when Signing Visit  CC: Stroke follow-up    HPI:  Sheila Burleson is a  83 y.o.  right-handed female with hypertension, hyperlipidemia, BALBINA not currently using CPAP, hypothyroidism, and history of stroke is being seen follow-up today for stroke.  She was last seen by me in June 2023.     In February 2017 the patient presented to [facility] with complaints of horizontal diplopia and balance issues.  She was initially discharged home on meclizine however symptoms recurred and she was admitted for stroke work-up.  She was evaluated by Dr. Arizmendi who reported exam was highly suggestive of right ARIAN.  She was presumed to have a right brainstem infarct despite negative MRI x3.  MRA head and neck were also negative for any significant stenosis.  Symptoms lasted less than 24 hours and resolved completely.  She was on aspirin prior to that episode and was switched to Plavix at that time.  She had an outpatient prolonged cardiac monitoring which was negative for any evidence of A-fib.  She does have a longstanding history of statin intolerance but has been able to tolerate Livalo.    Today she denies any TIA or stroke symptoms in the last year.  No significant changes to her medical history or hospitalizations in the last year.  She continues to take Plavix and Livalo.  No falls in the last year or significant changes to memory.  She does report that she has had increased difficulty tolerating CPAP and  has an upcoming appointment with pulmonology to discuss this.     In November 2023LDL was 128, HDL 45, triglycerides 166, total cholesterol 206, CMP was unremarkable aside from calcium of 10.6.     Past Medical History:   Diagnosis Date   • Allergy desensitization therapy     gets shot weekly   • Daytime sleepiness    • Depression    • Diverticulosis    • Head trauma    • History of colon polyps    • Hyperlipidemia    • Hypertension    • Hypothyroidism    • Irritable bowel syndrome    • Multilevel degenerative disc disease    • BALBINA on CPAP    • Osteoarthritis    • Sialodocholithiasis    • Sleep apnea    • Stroke    • Thyroid disease    • TIA (transient ischemic attack) 03/2017         Past Surgical History:   Procedure Laterality Date   • APPENDECTOMY     • CATARACT EXTRACTION W/ INTRAOCULAR LENS  IMPLANT, BILATERAL     • CHOLECYSTECTOMY     • COLON SURGERY     • COLONOSCOPY  2012    RECALL JAN 2018   • COLONOSCOPY N/A 12/12/2017    Procedure: COLONOSCOPY into cecum;  Surgeon: Dominic Sandoval MD;  Location: [facility] ENDOSCOPY;  Service:    • COLOSTOMY CLOSURE     • HERNIA REPAIR     • HYSTERECTOMY     • TUBAL ABDOMINAL LIGATION     • UPPER GASTROINTESTINAL ENDOSCOPY             Current Outpatient Medications:   •  Azelastine HCl 137 MCG/SPRAY solution, , Disp: , Rfl:   •  celecoxib (CeleBREX) 200 MG capsule, Take 1 capsule by mouth., Disp: , Rfl:   •  clopidogrel (PLAVIX) 75 MG tablet, Take 1 tablet by mouth Daily., Disp: 90 tablet, Rfl: 1  •  Diclofenac Sodium (VOLTAREN) 1 % gel gel, Apply 2 g topically to the appropriate area as directed 4 (Four) Times a Day., Disp: , Rfl:   •  Ergocalciferol (VITAMIN D2) 2000 UNITS tablet, Take by mouth daily., Disp: , Rfl:   •  famotidine (PEPCID) 40 MG tablet, Take 1 tablet by mouth Daily., Disp: , Rfl:   •  fluticasone (FLONASE) 50 MCG/ACT nasal spray, 2 sprays into the nostril(s) as directed by provider Daily., Disp: , Rfl:   •  levothyroxine (SYNTHROID, LEVOTHROID) 75 MCG  "tablet, TAKE ONE TABLET BY MOUTH DAILY, Disp: 90 tablet, Rfl: 0  •  LINZESS 72 MCG capsule capsule, , Disp: , Rfl:   •  Livalo 2 MG tablet tablet, , Disp: , Rfl:   •  losartan (COZAAR) 100 MG tablet, , Disp: , Rfl:   •  montelukast (SINGULAIR) 10 MG tablet, , Disp: , Rfl:   •  traZODone (DESYREL) 50 MG tablet, , Disp: , Rfl:   •  vitamin C (ASCORBIC ACID) 250 MG tablet, Take 1 tablet by mouth Daily., Disp: , Rfl:   •  alendronate (FOSAMAX) 70 MG tablet, TAKE 1 TABLET BY MOUTH ONCE WEEKLY BEFORE BREAKFAST, ON AN EMPTY STOMACH: REMAIN UPRIGHT FOR 30 MINUTES:TAKE WITH 8 OUNCES OF WATER (Patient not taking: Reported on 6/9/2023), Disp: , Rfl:   •  escitalopram (LEXAPRO) 10 MG tablet, Take 10 mg by mouth Daily., Disp: , Rfl:       Family History   Problem Relation Age of Onset   • Stroke Mother    • Hypertension Mother    • Thyroid disease Mother    • Heart disease Father    • Arthritis Father          Social History     Socioeconomic History   • Marital status: Single   Tobacco Use   • Smoking status: Never   • Smokeless tobacco: Never   Vaping Use   • Vaping status: Never Used   Substance and Sexual Activity   • Alcohol use: No   • Drug use: No   • Sexual activity: Never         Allergies   Allergen Reactions   • Alendronate Myalgia     myalgia   • Amoxicillin-Pot Clavulanate Diarrhea     Gi upset    • Clarithromycin Nausea Only   • Levofloxacin Nausea Only   • Mirapex [Pramipexole] Nausea Only   • Pollen Extract    • Pravastatin Unknown - High Severity     Muscle cramps             Physical Exam:  Vitals:    06/11/24 0913   BP: 120/72   Pulse: 70   SpO2: 96%   Weight: 62.7 kg (138 lb 3.2 oz)   Height: 157.5 cm (62\")     Orthostatic BP:    Body mass index is 25.28 kg/m².    General appearance: Well developed, well nourished, well groomed, alert and cooperative.   HEENT: Normocephalic.   Cardiac: Regular rate and rhythm. No murmurs.   Peripheral Vasculature: Radial pulses are equal and symmetric.  Chest Exam: Clear to " "auscultation bilaterally, no wheezes, no rhonchi.  Extremities: Normal, no edema.   Skin: No rashes or birthmarks.     Higher integrative function: Oriented to time, place, person, intact recent and remote memory, attention span, concentration and language. Spontaneous speech, fund of vocabulary are normal.   CN II: Normal  visual fields.   CN III IV VI: Extraocular movements are full without nystagmus. Pupils are equal, round, and reactive to light.   CN V: Normal facial sensation.  CN VII: Facial movements are symmetric, no weakness.   CN VIII: Auditory acuity is normal.   CN IX & X: Symmetric palatal movement.   CN XI: Sternocleidomastoid and trapezius are normal. No weakness.   CN XII: The tongue is midline. No atrophy or fasciculations.   Motor: Normal muscle strength, bulk, and tone in upper and lower extremities. No fasciculations, rigidity, spasticity or abnormal movements.   Sensation: Normal light touch in all 4 extremities  Station and gait: Comes to stand easily, mildly antalgic gait.  Coordination: Finger to nose test showed no dysmetria. Heel to shin normal.       Results:      Lab Results   Component Value Date    GLUCOSE 90 02/27/2018    BUN 15 03/03/2023    CREATININE 0.83 03/03/2023    EGFRIFNONA 77 02/27/2018    EGFRIFAFRI >60 03/03/2023    BCR 18.1 03/03/2023    CO2 25 03/03/2023    CALCIUM 9.7 03/03/2023    PROTENTOTREF 6.5 02/27/2018    ALBUMIN 4.1 03/03/2023    LABIL2 1.6 03/03/2023    AST 21 03/03/2023    ALT 15 03/03/2023       Lab Results   Component Value Date    WBC 4.99 11/28/2023    HGB 13.9 11/28/2023    HCT 43.0 11/28/2023    MCV 90.9 11/28/2023     11/28/2023         .No results found for: \"RPR\"      Lab Results   Component Value Date    TSH 3.880 06/06/2022    X1CHMEM 8.1 07/30/2015    THYROIDAB 266.90 (H) 10/30/2023         Lab Results   Component Value Date    [id number] 373 04/01/2024         No results found for: \"FOLATE\"      Lab Results   Component Value Date    HGBA1C " 5.6 11/30/2023         Lab Results   Component Value Date    GLUCOSE 90 02/27/2018    BUN 15 03/03/2023    CREATININE 0.83 03/03/2023    EGFRIFNONA 77 02/27/2018    EGFRIFAFRI >60 03/03/2023    BCR 18.1 03/03/2023    K 4.3 03/03/2023    CO2 25 03/03/2023    CALCIUM 9.7 03/03/2023    PROTENTOTREF 6.5 02/27/2018    ALBUMIN 4.1 03/03/2023    LABIL2 1.6 03/03/2023    AST 21 03/03/2023    ALT 15 03/03/2023         Lab Results   Component Value Date    WBC 4.99 11/28/2023    HGB 13.9 11/28/2023    HCT 43.0 11/28/2023    MCV 90.9 11/28/2023     11/28/2023             Assessment/Plan:        Diagnoses and all orders for this visit:    1. History of stroke (Primary)    2. Mixed hyperlipidemia    She has a was seen today for stroke follow-up.  No TIA or stroke symptoms in the last year.  Since I last saw her she has stopped using her CPAP but has an upcoming appointment with pulmonology to address issues.  BP appears well-controlled however hyperlipidemia not at goal but she has a history of statin intolerance   Continue Plavix 75 mg daily  Blood pressure control to <130/80   Goal LDL <70-continue Livalo at current dose, intolerant to other statins   Serum glucose < 140   Call 911 for stroke any stroke symptoms   Encouraged regular CPAP use   Follow-up in 1 year or as needed      Total time: 24 minutes            Dictated utilizing Dragon dictation.

## 2024-07-15 ENCOUNTER — TELEPHONE (OUTPATIENT)
Dept: SLEEP MEDICINE | Facility: HOSPITAL | Age: 84
End: 2024-07-15
Payer: MEDICARE

## 2024-07-15 ENCOUNTER — OFFICE VISIT (OUTPATIENT)
Dept: SLEEP MEDICINE | Facility: HOSPITAL | Age: 84
End: 2024-07-15
Payer: MEDICARE

## 2024-07-15 VITALS
OXYGEN SATURATION: 97 % | DIASTOLIC BLOOD PRESSURE: 70 MMHG | HEIGHT: 62 IN | BODY MASS INDEX: 25.65 KG/M2 | WEIGHT: 139.4 LBS | HEART RATE: 81 BPM | SYSTOLIC BLOOD PRESSURE: 129 MMHG

## 2024-07-15 DIAGNOSIS — G47.33 OSA (OBSTRUCTIVE SLEEP APNEA): Primary | ICD-10-CM

## 2024-07-15 DIAGNOSIS — Z78.9 DIFFICULTY WITH CPAP USE: ICD-10-CM

## 2024-07-15 PROCEDURE — G0463 HOSPITAL OUTPT CLINIC VISIT: HCPCS

## 2024-07-15 NOTE — PROGRESS NOTES
[facility]  Telephone: [phone] / Fax: [phone] [place]  Telephone: [phone] / Fax: [phone] Arlette Vasquez    PCP: Desmond Zepeda MD    Reason for visit: BALBINA f/u    Sheila Burleson is a 83 y.o.female  was last seen at [facility] sleep lab in  2022. She did not see us as [facility] was out of network with [facility]. She saw Shade mendoza in a meantime and reportedly had another sleep study vs overnight oximetry. She is not sure what the study showed. I don't see a copy in the system. She returns for re-evaluation as she struggles with CPAP.    She stopped using the machine in May 2024 as the pressure were feeling too high. Her machine is set at 11cm H2O. It stings her nose. She c/o snoring and apneas while off CPAP. Her sleep schedule is 10pm-7am. ESS is 1.    SH- no tobacco, 2 caffeine per day, 3 energy drinks.    ROS- negative.    DME [facility]    Current Medications:    Current Outpatient Medications:     alendronate (FOSAMAX) 70 MG tablet, TAKE 1 TABLET BY MOUTH ONCE WEEKLY BEFORE BREAKFAST, ON AN EMPTY STOMACH: REMAIN UPRIGHT FOR 30 MINUTES:TAKE WITH 8 OUNCES OF WATER (Patient not taking: Reported on 6/9/2023), Disp: , Rfl:     Azelastine HCl 137 MCG/SPRAY solution, , Disp: , Rfl:     celecoxib (CeleBREX) 200 MG capsule, Take 1 capsule by mouth., Disp: , Rfl:     clopidogrel (PLAVIX) 75 MG tablet, Take 1 tablet by mouth Daily., Disp: 90 tablet, Rfl: 1    Diclofenac Sodium (VOLTAREN) 1 % gel gel, Apply 2 g topically to the appropriate area as directed 4 (Four) Times a Day., Disp: , Rfl:     Ergocalciferol (VITAMIN D2) 2000 UNITS tablet, Take by mouth daily., Disp: , Rfl:     escitalopram (LEXAPRO) 10 MG tablet, Take 10 mg by mouth Daily., Disp: , Rfl:     famotidine (PEPCID) 40 MG tablet, Take 1 tablet by mouth Daily., Disp: , Rfl:     fluticasone (FLONASE) 50 MCG/ACT nasal spray, 2 sprays into the nostril(s) as directed by provider Daily., Disp: , Rfl:     levothyroxine  "(SYNTHROID, LEVOTHROID) 75 MCG tablet, TAKE ONE TABLET BY MOUTH DAILY, Disp: 90 tablet, Rfl: 0    LINZESS 72 MCG capsule capsule, , Disp: , Rfl:     Livalo 2 MG tablet tablet, , Disp: , Rfl:     losartan (COZAAR) 100 MG tablet, , Disp: , Rfl:     montelukast (SINGULAIR) 10 MG tablet, , Disp: , Rfl:     traZODone (DESYREL) 50 MG tablet, , Disp: , Rfl:     vitamin C (ASCORBIC ACID) 250 MG tablet, Take 1 tablet by mouth Daily., Disp: , Rfl:    also entered in Sleep Questionnaire    Patient  has a past medical history of Allergy desensitization therapy, Daytime sleepiness, Depression, Diverticulosis, Head trauma, History of colon polyps, Hyperlipidemia, Hypertension, Hypothyroidism, Irritable bowel syndrome, Multilevel degenerative disc disease, BALBINA on CPAP, Osteoarthritis, Sialodocholithiasis, Sleep apnea, Stroke, Thyroid disease, and TIA (transient ischemic attack) (03/2017).    I have reviewed the Past Medical History, Past Surgical History, Social History and Family History.    Vital Signs /70   Pulse 81   Ht 157.5 cm (62\")   Wt 63.2 kg (139 lb 6.4 oz)   SpO2 97%   BMI 25.50 kg/m²  Body mass index is 25.5 kg/m².    General Alert and oriented. No acute distress noted   Pharynx/Throat Class IV  Mallampati airway, large tongue, no evidence of redundant lateral pharyngeal tissue. No oral lesions. No thrush. Moist mucous membranes.   Head Normocephalic. Symmetrical. Atraumatic.    Nose No septal deviation. No drainage   Chest Wall Normal shape. Symmetric expansion with respiration. No tenderness.   Neck Trachea midline, no thyromegaly or adenopathy    Lungs Clear to auscultation bilaterally. No wheezes. No rhonchi. No rales. Respirations regular, even and unlabored.   Heart Regular rhythm and normal rate. Normal S1 and S2. No murmur   Abdomen Soft, non-tender and non-distended. Normal bowel sounds. No masses.   Extremities Moves all extremities well. No edema   Psychiatric Normal mood and affect. "     Testing:  Download 2/27/24-5/26/24, 11% use with average nightly use of 1 hours and 40 minutes on CPAP 11cm H2O, AHI 5/hr, leak is 6 seconds.    Study-Diagnostic data available to date is as below and was reviewed on current visit:  5/4/17- Overnight split polysomnogram study.  Diagnostic study from 11:05 PM to 20 2 AM.  Sleep efficiency 74% with 2.43 hours total sleep time.  Sleep distribution shows absence of slow-wave sleep and REM sleep.  AHI index overall was 19.7 with a respiratory disturbance index of 41.5.  In supine position AHI is 63 with RDI of 82.  Absence of REM sleep severity may be underestimated.  Oxygen saturation remained above 89%.  Arousal index 59.  PLM index 140.  PLM with arousal index 26.  Snoring for 5.5% of total sleep time.     Titration study from 2:22 AM to 5:59 AM.  Sleep efficiency was much poor at 45% on the titration portion.  There was again no REM sleep on the titration study.  PLM index was elevated at 38 with a PLM arousal index of 4.9 on the titration study.  CPAP was increased up to 17 cm water pressure.  Maximum sleep achieved at 15 cm though even then an ideal pressure could not be found.    Impression:  1. BALBINA (obstructive sleep apnea)    2. Difficulty with CPAP use          Plan:  Recommend reducing pressures to 5-8cm H2O. Resume CPAP use as soon as possible. Reassess response to therapy in 3 months. She may need titration study if she continues to struggle. I requested a copy of her sleep study done at [place] recently. It is unclear what study it was.    Consequences of untreated sleep apnea reviewed with patient.    Thank you for allowing me to participate in your patient's care.      KAREN Soto  [facility]  Phone: [phone]      Part of this note may be an electronic transcription/translation of spoken language to printed text using the Dragon Dictation System.

## 2025-05-14 ENCOUNTER — TELEPHONE (OUTPATIENT)
Dept: NEUROLOGY | Facility: CLINIC | Age: 85
End: 2025-05-14

## 2025-05-14 NOTE — TELEPHONE ENCOUNTER
Caller: Sheila Burleson    Relationship to patient: Self    Best call back number: [phone]    Chief complaint: CVA    Type of visit: FOLLOW UP    Requested date: SOME TIME IN JUNE     If rescheduling, when is the original appointment: 5/15/25     Additional notes: STATES SHE CAN'T MAKE APPT TOMORROW & WAS ADVISED THAT HER APPT WOULD BE 6/12/25 @ 3:30. PLEASE ADVISE ON SCHEDULING.

## (undated) DEVICE — Device: Brand: DEFENDO AIR/WATER/SUCTION AND BIOPSY VALVE

## (undated) DEVICE — THE TORRENT IRRIGATION SCOPE CONNECTOR IS USED WITH THE TORRENT IRRIGATION TUBING TO PROVIDE IRRIGATION FLUIDS SUCH AS STERILE WATER DURING GASTROINTESTINAL ENDOSCOPIC PROCEDURES WHEN USED IN CONJUNCTION WITH AN IRRIGATION PUMP (OR ELECTROSURGICAL UNIT).: Brand: TORRENT

## (undated) DEVICE — TUBING, SUCTION, 1/4" X 10', STRAIGHT: Brand: MEDLINE

## (undated) DEVICE — CANN NASL CO2 TRULINK W/O2 A/

## (undated) DEVICE — TBG 02 CRUSH RESIST LF CLR 7FT